# Patient Record
Sex: FEMALE | Race: WHITE | HISPANIC OR LATINO | Employment: OTHER | URBAN - METROPOLITAN AREA
[De-identification: names, ages, dates, MRNs, and addresses within clinical notes are randomized per-mention and may not be internally consistent; named-entity substitution may affect disease eponyms.]

---

## 2017-01-26 ENCOUNTER — HOSPITAL ENCOUNTER (INPATIENT)
Facility: HOSPITAL | Age: 67
LOS: 1 days | Discharge: HOME | DRG: 379 | End: 2017-01-28
Attending: EMERGENCY MEDICINE | Admitting: INTERNAL MEDICINE
Payer: MEDICARE

## 2017-01-26 DIAGNOSIS — K92.2 LOWER GI BLEED: ICD-10-CM

## 2017-01-26 DIAGNOSIS — K92.2 GI BLEED: Primary | ICD-10-CM

## 2017-01-26 PROCEDURE — 85025 COMPLETE CBC W/AUTO DIFF WBC: CPT | Performed by: EMERGENCY MEDICINE

## 2017-01-26 PROCEDURE — 86900 BLOOD TYPING SEROLOGIC ABO: CPT | Performed by: EMERGENCY MEDICINE

## 2017-01-26 PROCEDURE — 85730 THROMBOPLASTIN TIME PARTIAL: CPT | Performed by: EMERGENCY MEDICINE

## 2017-01-26 PROCEDURE — 81002 URINALYSIS NONAUTO W/O SCOPE: CPT | Performed by: EMERGENCY MEDICINE

## 2017-01-26 PROCEDURE — 83690 ASSAY OF LIPASE: CPT | Performed by: EMERGENCY MEDICINE

## 2017-01-26 PROCEDURE — 80053 COMPREHEN METABOLIC PANEL: CPT | Performed by: EMERGENCY MEDICINE

## 2017-01-26 PROCEDURE — 85610 PROTHROMBIN TIME: CPT | Performed by: EMERGENCY MEDICINE

## 2017-01-26 PROCEDURE — 86850 RBC ANTIBODY SCREEN: CPT | Performed by: EMERGENCY MEDICINE

## 2017-01-26 PROCEDURE — 86901 BLOOD TYPING SEROLOGIC RH(D): CPT | Performed by: EMERGENCY MEDICINE

## 2017-01-26 PROCEDURE — 36415 COLL VENOUS BLD VENIPUNCTURE: CPT | Performed by: EMERGENCY MEDICINE

## 2017-01-26 PROCEDURE — 81025 URINE PREGNANCY TEST: CPT | Performed by: EMERGENCY MEDICINE

## 2017-01-26 RX ORDER — HYDROCHLOROTHIAZIDE 25 MG/1
25 TABLET ORAL
COMMUNITY
Start: 2015-02-06 | End: 2017-03-25 | Stop reason: ALTCHOICE

## 2017-01-26 RX ORDER — ENALAPRIL MALEATE 20 MG/1
40 TABLET ORAL DAILY
COMMUNITY
Start: 2016-11-02 | End: 2018-05-10 | Stop reason: SDUPTHER

## 2017-01-27 ENCOUNTER — LAB CONVERSION - ENCOUNTER (OUTPATIENT)
Dept: OTHER | Facility: OTHER | Age: 67
End: 2017-01-27

## 2017-01-27 ENCOUNTER — ANESTHESIA EVENT (INPATIENT)
Dept: GASTROENTEROLOGY | Facility: HOSPITAL | Age: 67
DRG: 379 | End: 2017-01-27
Payer: MEDICARE

## 2017-01-27 ENCOUNTER — ANESTHESIA (INPATIENT)
Dept: GASTROENTEROLOGY | Facility: HOSPITAL | Age: 67
DRG: 379 | End: 2017-01-27
Payer: MEDICARE

## 2017-01-27 PROBLEM — K92.2 LOWER GI BLEED: Status: ACTIVE | Noted: 2017-01-27

## 2017-01-27 PROBLEM — I10 HTN (HYPERTENSION): Status: ACTIVE | Noted: 2017-01-27

## 2017-01-27 PROBLEM — R19.7 DIARRHEA: Status: ACTIVE | Noted: 2017-01-27

## 2017-01-27 PROBLEM — R00.0 SINUS TACHYCARDIA: Status: ACTIVE | Noted: 2017-01-27

## 2017-01-27 LAB
ABO GROUP BLD: NORMAL
ALBUMIN SERPL BCP-MCNC: 3.3 G/DL (ref 3.5–5)
ALP SERPL-CCNC: 125 U/L (ref 46–116)
ALT SERPL W P-5'-P-CCNC: 50 U/L (ref 12–78)
ANION GAP SERPL CALCULATED.3IONS-SCNC: 7 MMOL/L (ref 4–13)
APTT PPP: 29 SECONDS (ref 24–36)
AST SERPL W P-5'-P-CCNC: 45 U/L (ref 5–45)
ATRIAL RATE: 98 BPM
BACTERIA UR QL AUTO: ABNORMAL /HPF
BASOPHILS # BLD AUTO: 0.02 THOUSANDS/ΜL (ref 0–0.1)
BASOPHILS NFR BLD AUTO: 0 % (ref 0–1)
BILIRUB SERPL-MCNC: 0.32 MG/DL (ref 0.2–1)
BILIRUB UR QL STRIP: NEGATIVE
BLD GP AB SCN SERPL QL: NEGATIVE
BUN SERPL-MCNC: 11 MG/DL (ref 5–25)
CALCIUM SERPL-MCNC: 9.1 MG/DL (ref 8.3–10.1)
CHLORIDE SERPL-SCNC: 104 MMOL/L (ref 100–108)
CLARITY UR: CLEAR
CO2 SERPL-SCNC: 28 MMOL/L (ref 21–32)
COLOR UR: YELLOW
CREAT SERPL-MCNC: 0.86 MG/DL (ref 0.6–1.3)
EOSINOPHIL # BLD AUTO: 0.01 THOUSAND/ΜL (ref 0–0.61)
EOSINOPHIL NFR BLD AUTO: 0 % (ref 0–6)
ERYTHROCYTE [DISTWIDTH] IN BLOOD BY AUTOMATED COUNT: 13.6 % (ref 11.6–15.1)
GFR SERPL CREATININE-BSD FRML MDRD: >60 ML/MIN/1.73SQ M
GLUCOSE SERPL-MCNC: 206 MG/DL (ref 65–140)
GLUCOSE UR STRIP-MCNC: ABNORMAL MG/DL
HCG UR QL: NEGATIVE
HCT VFR BLD AUTO: 30.3 % (ref 34.8–46.1)
HCT VFR BLD AUTO: 35.3 % (ref 34.8–46.1)
HCT VFR BLD AUTO: 38.7 % (ref 34.8–46.1)
HGB BLD-MCNC: 10.2 G/DL (ref 11.5–15.4)
HGB BLD-MCNC: 11.9 G/DL (ref 11.5–15.4)
HGB BLD-MCNC: 12.9 G/DL (ref 11.5–15.4)
HGB UR QL STRIP.AUTO: ABNORMAL
INR PPP: 1.04 (ref 0.86–1.16)
KETONES UR STRIP-MCNC: NEGATIVE MG/DL
LEUKOCYTE ESTERASE UR QL STRIP: ABNORMAL
LIPASE SERPL-CCNC: 227 U/L (ref 73–393)
LYMPHOCYTES # BLD AUTO: 1.43 THOUSANDS/ΜL (ref 0.6–4.47)
LYMPHOCYTES NFR BLD AUTO: 15 % (ref 14–44)
MCH RBC QN AUTO: 32.3 PG (ref 26.8–34.3)
MCHC RBC AUTO-ENTMCNC: 33.3 G/DL (ref 31.4–37.4)
MCV RBC AUTO: 97 FL (ref 82–98)
MONOCYTES # BLD AUTO: 0.37 THOUSAND/ΜL (ref 0.17–1.22)
MONOCYTES NFR BLD AUTO: 4 % (ref 4–12)
NEUTROPHILS # BLD AUTO: 7.96 THOUSANDS/ΜL (ref 1.85–7.62)
NEUTS SEG NFR BLD AUTO: 81 % (ref 43–75)
NITRITE UR QL STRIP: NEGATIVE
NON-SQ EPI CELLS URNS QL MICRO: ABNORMAL /HPF
NRBC BLD AUTO-RTO: 0 /100 WBCS
P AXIS: 58 DEGREES
PH UR STRIP.AUTO: 7 [PH] (ref 4.5–8)
PLATELET # BLD AUTO: 278 THOUSANDS/UL (ref 149–390)
PMV BLD AUTO: 10.9 FL (ref 8.9–12.7)
POTASSIUM SERPL-SCNC: 3.5 MMOL/L (ref 3.5–5.3)
PR INTERVAL: 134 MS
PROT SERPL-MCNC: 7.9 G/DL (ref 6.4–8.2)
PROT UR STRIP-MCNC: NEGATIVE MG/DL
PROTHROMBIN TIME: 13.6 SECONDS (ref 12–14.3)
QRS AXIS: 54 DEGREES
QRSD INTERVAL: 68 MS
QT INTERVAL: 356 MS
QTC INTERVAL: 454 MS
RBC # BLD AUTO: 3.99 MILLION/UL (ref 3.81–5.12)
RBC #/AREA URNS AUTO: ABNORMAL /HPF
RH BLD: POSITIVE
SODIUM SERPL-SCNC: 139 MMOL/L (ref 136–145)
SP GR UR STRIP.AUTO: 1.02 (ref 1–1.03)
T WAVE AXIS: 61 DEGREES
UROBILINOGEN UR QL STRIP.AUTO: 0.2 E.U./DL
VENTRICULAR RATE: 98 BPM
WBC # BLD AUTO: 9.79 THOUSAND/UL (ref 4.31–10.16)
WBC #/AREA URNS AUTO: ABNORMAL /HPF

## 2017-01-27 PROCEDURE — 85014 HEMATOCRIT: CPT | Performed by: PHYSICIAN ASSISTANT

## 2017-01-27 PROCEDURE — 87045 FECES CULTURE AEROBIC BACT: CPT | Performed by: PHYSICIAN ASSISTANT

## 2017-01-27 PROCEDURE — 81001 URINALYSIS AUTO W/SCOPE: CPT

## 2017-01-27 PROCEDURE — 85018 HEMOGLOBIN: CPT | Performed by: PHYSICIAN ASSISTANT

## 2017-01-27 PROCEDURE — 96360 HYDRATION IV INFUSION INIT: CPT

## 2017-01-27 PROCEDURE — 87046 STOOL CULTR AEROBIC BACT EA: CPT | Performed by: PHYSICIAN ASSISTANT

## 2017-01-27 PROCEDURE — 82272 OCCULT BLD FECES 1-3 TESTS: CPT

## 2017-01-27 PROCEDURE — 87015 SPECIMEN INFECT AGNT CONCNTJ: CPT | Performed by: PHYSICIAN ASSISTANT

## 2017-01-27 PROCEDURE — 87493 C DIFF AMPLIFIED PROBE: CPT | Performed by: PHYSICIAN ASSISTANT

## 2017-01-27 PROCEDURE — 99285 EMERGENCY DEPT VISIT HI MDM: CPT

## 2017-01-27 PROCEDURE — 87086 URINE CULTURE/COLONY COUNT: CPT

## 2017-01-27 PROCEDURE — 87899 AGENT NOS ASSAY W/OPTIC: CPT | Performed by: PHYSICIAN ASSISTANT

## 2017-01-27 PROCEDURE — 0DJD8ZZ INSPECTION OF LOWER INTESTINAL TRACT, VIA NATURAL OR ARTIFICIAL OPENING ENDOSCOPIC: ICD-10-PCS | Performed by: INTERNAL MEDICINE

## 2017-01-27 PROCEDURE — 93005 ELECTROCARDIOGRAM TRACING: CPT | Performed by: PHYSICIAN ASSISTANT

## 2017-01-27 RX ORDER — ONDANSETRON 2 MG/ML
4 INJECTION INTRAMUSCULAR; INTRAVENOUS EVERY 6 HOURS PRN
Status: DISCONTINUED | OUTPATIENT
Start: 2017-01-27 | End: 2017-01-27

## 2017-01-27 RX ORDER — SODIUM CHLORIDE 9 MG/ML
75 INJECTION, SOLUTION INTRAVENOUS CONTINUOUS
Status: DISCONTINUED | OUTPATIENT
Start: 2017-01-27 | End: 2017-01-28

## 2017-01-27 RX ORDER — ENALAPRIL MALEATE 10 MG/1
40 TABLET ORAL DAILY
Status: DISCONTINUED | OUTPATIENT
Start: 2017-01-27 | End: 2017-01-28 | Stop reason: HOSPADM

## 2017-01-27 RX ORDER — CALCIUM CARBONATE 200(500)MG
1000 TABLET,CHEWABLE ORAL DAILY PRN
Status: DISCONTINUED | OUTPATIENT
Start: 2017-01-27 | End: 2017-01-27

## 2017-01-27 RX ORDER — MORPHINE SULFATE 4 MG/ML
4 INJECTION, SOLUTION INTRAMUSCULAR; INTRAVENOUS ONCE AS NEEDED
Status: DISCONTINUED | OUTPATIENT
Start: 2017-01-27 | End: 2017-01-27

## 2017-01-27 RX ORDER — ONDANSETRON 2 MG/ML
4 INJECTION INTRAMUSCULAR; INTRAVENOUS ONCE AS NEEDED
Status: DISCONTINUED | OUTPATIENT
Start: 2017-01-27 | End: 2017-01-27

## 2017-01-27 RX ORDER — ACETAMINOPHEN 325 MG/1
650 TABLET ORAL EVERY 4 HOURS PRN
Status: DISCONTINUED | OUTPATIENT
Start: 2017-01-27 | End: 2017-01-28 | Stop reason: HOSPADM

## 2017-01-27 RX ORDER — PROPOFOL 10 MG/ML
INJECTION, EMULSION INTRAVENOUS AS NEEDED
Status: DISCONTINUED | OUTPATIENT
Start: 2017-01-27 | End: 2017-01-27 | Stop reason: SURG

## 2017-01-27 RX ADMIN — PROPOFOL 100 MG: 10 INJECTION, EMULSION INTRAVENOUS at 14:38

## 2017-01-27 RX ADMIN — SODIUM CHLORIDE 1000 ML: 0.9 INJECTION, SOLUTION INTRAVENOUS at 01:36

## 2017-01-27 RX ADMIN — SODIUM CHLORIDE 75 ML/HR: 0.9 INJECTION, SOLUTION INTRAVENOUS at 02:44

## 2017-01-27 RX ADMIN — SODIUM CHLORIDE: 0.9 INJECTION, SOLUTION INTRAVENOUS at 15:01

## 2017-01-27 RX ADMIN — PROPOFOL 100 MG: 10 INJECTION, EMULSION INTRAVENOUS at 14:43

## 2017-01-27 RX ADMIN — ENALAPRIL MALEATE 40 MG: 10 TABLET ORAL at 08:01

## 2017-01-27 RX ADMIN — SODIUM CHLORIDE 75 ML/HR: 0.9 INJECTION, SOLUTION INTRAVENOUS at 05:22

## 2017-01-27 RX ADMIN — SODIUM CHLORIDE 1000 ML: 0.9 INJECTION, SOLUTION INTRAVENOUS at 00:00

## 2017-01-28 VITALS
SYSTOLIC BLOOD PRESSURE: 159 MMHG | TEMPERATURE: 97.2 F | RESPIRATION RATE: 18 BRPM | HEIGHT: 67 IN | BODY MASS INDEX: 24.84 KG/M2 | HEART RATE: 73 BPM | WEIGHT: 158.29 LBS | OXYGEN SATURATION: 98 % | DIASTOLIC BLOOD PRESSURE: 70 MMHG

## 2017-01-28 LAB
BACTERIA UR CULT: NORMAL
C DIFF TOX GENS STL QL NAA+PROBE: NORMAL
HCT VFR BLD AUTO: 33 % (ref 34.8–46.1)
HGB BLD-MCNC: 11 G/DL (ref 11.5–15.4)

## 2017-01-28 PROCEDURE — 85018 HEMOGLOBIN: CPT | Performed by: PHYSICIAN ASSISTANT

## 2017-01-28 PROCEDURE — 85014 HEMATOCRIT: CPT | Performed by: PHYSICIAN ASSISTANT

## 2017-01-28 RX ORDER — DOCUSATE SODIUM 100 MG/1
100 CAPSULE, LIQUID FILLED ORAL 2 TIMES DAILY
Qty: 60 CAPSULE | Refills: 0 | Status: SHIPPED | OUTPATIENT
Start: 2017-01-28 | End: 2019-03-22

## 2017-01-28 RX ADMIN — ENALAPRIL MALEATE 40 MG: 10 TABLET ORAL at 08:36

## 2017-01-29 LAB
BACTERIA STL CULT: NORMAL
BACTERIA STL CULT: NORMAL

## 2017-01-31 ENCOUNTER — GENERIC CONVERSION - ENCOUNTER (OUTPATIENT)
Dept: OTHER | Facility: OTHER | Age: 67
End: 2017-01-31

## 2017-02-02 ENCOUNTER — ALLSCRIPTS OFFICE VISIT (OUTPATIENT)
Dept: OTHER | Facility: OTHER | Age: 67
End: 2017-02-02

## 2017-02-14 ENCOUNTER — ANESTHESIA EVENT (OUTPATIENT)
Dept: GASTROENTEROLOGY | Facility: MEDICAL CENTER | Age: 67
End: 2017-02-14

## 2017-02-14 ENCOUNTER — ANESTHESIA (OUTPATIENT)
Dept: GASTROENTEROLOGY | Facility: MEDICAL CENTER | Age: 67
End: 2017-02-14

## 2017-03-25 ENCOUNTER — APPOINTMENT (INPATIENT)
Dept: CT IMAGING | Facility: HOSPITAL | Age: 67
DRG: 378 | End: 2017-03-25
Payer: MEDICARE

## 2017-03-25 ENCOUNTER — HOSPITAL ENCOUNTER (INPATIENT)
Facility: HOSPITAL | Age: 67
LOS: 5 days | Discharge: HOME | DRG: 378 | End: 2017-03-30
Attending: EMERGENCY MEDICINE | Admitting: INTERNAL MEDICINE
Payer: MEDICARE

## 2017-03-25 DIAGNOSIS — D62 ACUTE BLOOD LOSS ANEMIA: ICD-10-CM

## 2017-03-25 DIAGNOSIS — K92.1 BLOOD IN STOOL: ICD-10-CM

## 2017-03-25 DIAGNOSIS — K62.5 RECTAL BLEEDING: Primary | ICD-10-CM

## 2017-03-25 LAB
ALBUMIN SERPL BCP-MCNC: 3.7 G/DL (ref 3.5–5)
ALP SERPL-CCNC: 141 U/L (ref 46–116)
ALT SERPL W P-5'-P-CCNC: 36 U/L (ref 12–78)
ANION GAP SERPL CALCULATED.3IONS-SCNC: 8 MMOL/L (ref 4–13)
APTT PPP: 27 SECONDS (ref 24–36)
AST SERPL W P-5'-P-CCNC: 34 U/L (ref 5–45)
BASOPHILS # BLD AUTO: 0.04 THOUSANDS/ΜL (ref 0–0.1)
BASOPHILS NFR BLD AUTO: 0 % (ref 0–1)
BILIRUB SERPL-MCNC: 0.25 MG/DL (ref 0.2–1)
BUN SERPL-MCNC: 12 MG/DL (ref 5–25)
CALCIUM SERPL-MCNC: 8.5 MG/DL (ref 8.3–10.1)
CHLORIDE SERPL-SCNC: 104 MMOL/L (ref 100–108)
CO2 SERPL-SCNC: 30 MMOL/L (ref 21–32)
CREAT SERPL-MCNC: 0.88 MG/DL (ref 0.6–1.3)
EOSINOPHIL # BLD AUTO: 0.03 THOUSAND/ΜL (ref 0–0.61)
EOSINOPHIL NFR BLD AUTO: 0 % (ref 0–6)
ERYTHROCYTE [DISTWIDTH] IN BLOOD BY AUTOMATED COUNT: 13.1 % (ref 11.6–15.1)
GFR SERPL CREATININE-BSD FRML MDRD: >60 ML/MIN/1.73SQ M
GLUCOSE SERPL-MCNC: 127 MG/DL (ref 65–140)
HCT VFR BLD AUTO: 38.8 % (ref 34.8–46.1)
HGB BLD-MCNC: 12.7 G/DL (ref 11.5–15.4)
HGB BLD-MCNC: 9.4 G/DL (ref 11.5–15.4)
INR PPP: 0.99 (ref 0.86–1.16)
LACTATE SERPL-SCNC: 1.8 MMOL/L (ref 0.5–2)
LIPASE SERPL-CCNC: 218 U/L (ref 73–393)
LYMPHOCYTES # BLD AUTO: 1.52 THOUSANDS/ΜL (ref 0.6–4.47)
LYMPHOCYTES NFR BLD AUTO: 14 % (ref 14–44)
MCH RBC QN AUTO: 31.3 PG (ref 26.8–34.3)
MCHC RBC AUTO-ENTMCNC: 32.7 G/DL (ref 31.4–37.4)
MCV RBC AUTO: 96 FL (ref 82–98)
MONOCYTES # BLD AUTO: 0.64 THOUSAND/ΜL (ref 0.17–1.22)
MONOCYTES NFR BLD AUTO: 6 % (ref 4–12)
NEUTROPHILS # BLD AUTO: 8.68 THOUSANDS/ΜL (ref 1.85–7.62)
NEUTS SEG NFR BLD AUTO: 80 % (ref 43–75)
NRBC BLD AUTO-RTO: 0 /100 WBCS
PLATELET # BLD AUTO: 314 THOUSANDS/UL (ref 149–390)
PMV BLD AUTO: 11 FL (ref 8.9–12.7)
POTASSIUM SERPL-SCNC: 3.5 MMOL/L (ref 3.5–5.3)
PROT SERPL-MCNC: 8.4 G/DL (ref 6.4–8.2)
PROTHROMBIN TIME: 13.1 SECONDS (ref 12–14.3)
RBC # BLD AUTO: 4.06 MILLION/UL (ref 3.81–5.12)
SODIUM SERPL-SCNC: 142 MMOL/L (ref 136–145)
WBC # BLD AUTO: 10.91 THOUSAND/UL (ref 4.31–10.16)

## 2017-03-25 PROCEDURE — 83690 ASSAY OF LIPASE: CPT | Performed by: EMERGENCY MEDICINE

## 2017-03-25 PROCEDURE — 80053 COMPREHEN METABOLIC PANEL: CPT | Performed by: EMERGENCY MEDICINE

## 2017-03-25 PROCEDURE — 74174 CTA ABD&PLVS W/CONTRAST: CPT

## 2017-03-25 PROCEDURE — 99285 EMERGENCY DEPT VISIT HI MDM: CPT

## 2017-03-25 PROCEDURE — 85025 COMPLETE CBC W/AUTO DIFF WBC: CPT | Performed by: EMERGENCY MEDICINE

## 2017-03-25 PROCEDURE — 83605 ASSAY OF LACTIC ACID: CPT | Performed by: PHYSICIAN ASSISTANT

## 2017-03-25 PROCEDURE — 85018 HEMOGLOBIN: CPT | Performed by: NURSE PRACTITIONER

## 2017-03-25 PROCEDURE — 85610 PROTHROMBIN TIME: CPT | Performed by: EMERGENCY MEDICINE

## 2017-03-25 PROCEDURE — C9113 INJ PANTOPRAZOLE SODIUM, VIA: HCPCS | Performed by: PHYSICIAN ASSISTANT

## 2017-03-25 PROCEDURE — 82272 OCCULT BLD FECES 1-3 TESTS: CPT

## 2017-03-25 PROCEDURE — 85730 THROMBOPLASTIN TIME PARTIAL: CPT | Performed by: EMERGENCY MEDICINE

## 2017-03-25 PROCEDURE — 36415 COLL VENOUS BLD VENIPUNCTURE: CPT | Performed by: EMERGENCY MEDICINE

## 2017-03-25 RX ORDER — DOCUSATE SODIUM 100 MG/1
100 CAPSULE, LIQUID FILLED ORAL 2 TIMES DAILY
Status: DISCONTINUED | OUTPATIENT
Start: 2017-03-25 | End: 2017-03-30 | Stop reason: HOSPADM

## 2017-03-25 RX ORDER — ENALAPRIL MALEATE 10 MG/1
40 TABLET ORAL DAILY
Status: DISCONTINUED | OUTPATIENT
Start: 2017-03-25 | End: 2017-03-26

## 2017-03-25 RX ORDER — ASPIRIN 81 MG/1
81 TABLET, CHEWABLE ORAL DAILY
Status: DISCONTINUED | OUTPATIENT
Start: 2017-03-25 | End: 2017-03-25

## 2017-03-25 RX ORDER — SODIUM CHLORIDE 9 MG/ML
75 INJECTION, SOLUTION INTRAVENOUS CONTINUOUS
Status: DISCONTINUED | OUTPATIENT
Start: 2017-03-25 | End: 2017-03-27

## 2017-03-25 RX ORDER — CALCIUM CARBONATE 200(500)MG
1000 TABLET,CHEWABLE ORAL DAILY PRN
Status: DISCONTINUED | OUTPATIENT
Start: 2017-03-25 | End: 2017-03-30 | Stop reason: HOSPADM

## 2017-03-25 RX ORDER — ONDANSETRON 2 MG/ML
4 INJECTION INTRAMUSCULAR; INTRAVENOUS EVERY 6 HOURS PRN
Status: DISCONTINUED | OUTPATIENT
Start: 2017-03-25 | End: 2017-03-30 | Stop reason: HOSPADM

## 2017-03-25 RX ORDER — PANTOPRAZOLE SODIUM 40 MG/1
40 INJECTION, POWDER, FOR SOLUTION INTRAVENOUS EVERY 12 HOURS SCHEDULED
Status: DISCONTINUED | OUTPATIENT
Start: 2017-03-25 | End: 2017-03-30 | Stop reason: HOSPADM

## 2017-03-25 RX ORDER — ACETAMINOPHEN 325 MG/1
650 TABLET ORAL EVERY 6 HOURS PRN
Status: DISCONTINUED | OUTPATIENT
Start: 2017-03-25 | End: 2017-03-30 | Stop reason: HOSPADM

## 2017-03-25 RX ADMIN — SODIUM CHLORIDE 60 ML/HR: 0.9 INJECTION, SOLUTION INTRAVENOUS at 14:35

## 2017-03-25 RX ADMIN — PANTOPRAZOLE SODIUM 40 MG: 40 INJECTION, POWDER, FOR SOLUTION INTRAVENOUS at 15:19

## 2017-03-25 RX ADMIN — PANTOPRAZOLE SODIUM 40 MG: 40 INJECTION, POWDER, FOR SOLUTION INTRAVENOUS at 21:38

## 2017-03-25 RX ADMIN — IOHEXOL 100 ML: 350 INJECTION, SOLUTION INTRAVENOUS at 22:13

## 2017-03-25 RX ADMIN — SODIUM CHLORIDE 1000 ML: 0.9 INJECTION, SOLUTION INTRAVENOUS at 21:33

## 2017-03-25 NOTE — ED PROVIDER NOTES
History  Chief Complaint   Patient presents with    Black or Bloody Stool     Patient reports dark red blood in stool this morning  Denies any abdominal pain, N/V  Hx of diverticulitis, seen here 1/27 for the same per patient  Patient is a 78 y o  female presenting with blood in stool  History provided by:  Patient   used: No    Black or Bloody Stool   Quality:  Bright red  Amount: Moderate  Duration:  3 hours  Timing:  Intermittent  Chronicity:  Recurrent  Context: spontaneously    Similar prior episodes: yes    Relieved by:  Nothing  Worsened by:  Nothing  Ineffective treatments:  None tried  Associated symptoms: no abdominal pain, no dizziness, no fever, no light-headedness, no loss of consciousness and no vomiting    Risk factors: no anticoagulant use and no hx of colorectal cancer    Risk factors comment:  DIVERTICULOSIS      Prior to Admission Medications   Prescriptions Last Dose Informant Patient Reported? Taking? Calcium-Magnesium-Vitamin D (CALCIUM 500 PO)   Yes Yes   Sig: Take by mouth daily   FIBER PO   Yes Yes   Sig: Take by mouth daily   aspirin 81 MG tablet   Yes Yes   Sig: Take 81 mg by mouth daily   docusate sodium (COLACE) 100 mg capsule   No Yes   Sig: Take 1 capsule by mouth 2 (two) times a day for 30 days   enalapril (VASOTEC) 20 mg tablet   Yes Yes   Sig: Take 40 mg by mouth      Facility-Administered Medications: None       Past Medical History:   Diagnosis Date    Arthritis     Diverticulosis of colon     Hx of bleeding disorder     rectal bleeding    Hypertension        Past Surgical History:   Procedure Laterality Date    COLONOSCOPY N/A 1/27/2017    Procedure: COLONOSCOPY;  Surgeon: Sade Swenson MD;  Location: [facility]; Service:     TUBAL LIGATION         History reviewed  No pertinent family history  I have reviewed and agree with the history as documented      Social History   Substance Use Topics    Smoking status: Never Smoker    Smokeless tobacco: None    Alcohol use No        Review of Systems   Constitutional: Negative for activity change, chills and fever  HENT: Negative for facial swelling, sore throat and trouble swallowing  Eyes: Negative for pain and visual disturbance  Respiratory: Negative for cough, chest tightness and shortness of breath  Cardiovascular: Negative for chest pain and leg swelling  Gastrointestinal: Positive for blood in stool  Negative for abdominal pain, diarrhea, nausea and vomiting  Genitourinary: Negative for dysuria and flank pain  Musculoskeletal: Negative for back pain, neck pain and neck stiffness  Skin: Negative for pallor and rash  Allergic/Immunologic: Negative for environmental allergies and immunocompromised state  Neurological: Negative for dizziness, loss of consciousness, light-headedness and headaches  Hematological: Negative for adenopathy  Does not bruise/bleed easily  Psychiatric/Behavioral: Negative for agitation and behavioral problems  All other systems reviewed and are negative  Physical Exam    ED Triage Vitals   Temperature Pulse Respirations Blood Pressure SpO2   03/25/17 1112 03/25/17 1112 03/25/17 1112 03/25/17 1112 03/25/17 1112   97 5 °F (36 4 °C) 106 20 193/93 98 %      Temp Source Heart Rate Source Patient Position BP Location FiO2 (%)   03/25/17 1112 03/25/17 1112 03/25/17 1310 03/25/17 1112 --   Temporal Monitor Sitting Right arm       Pain Score       03/25/17 1112       No Pain           Physical Exam   Constitutional: She is oriented to person, place, and time  She appears well-developed and well-nourished  No distress  HENT:   Head: Normocephalic and atraumatic  Eyes: EOM are normal    Neck: Normal range of motion  Neck supple  Cardiovascular: Normal rate, regular rhythm, normal heart sounds and intact distal pulses  Pulmonary/Chest: Effort normal and breath sounds normal    Abdominal: Soft  Bowel sounds are normal  There is no tenderness  There is no rebound and no guarding  Genitourinary: Rectal exam shows guaiac positive stool  Genitourinary Comments: Red blood on rectal exam   Musculoskeletal: Normal range of motion  Neurological: She is alert and oriented to person, place, and time  Skin: Skin is warm and dry  Psychiatric: She has a normal mood and affect  Nursing note and vitals reviewed        ED Medications  Medications   docusate sodium (COLACE) capsule 100 mg (100 mg Oral Not Given 3/25/17 1717)   enalapril (VASOTEC) tablet 40 mg (40 mg Oral Not Given 3/25/17 1514)   ondansetron (ZOFRAN) injection 4 mg (not administered)   calcium carbonate (TUMS) chewable tablet 1,000 mg (not administered)   acetaminophen (TYLENOL) tablet 650 mg (not administered)   pantoprazole (PROTONIX) injection 40 mg (40 mg Intravenous Given 3/25/17 1519)   sodium chloride 0 9 % infusion (60 mL/hr Intravenous New Bag 3/25/17 1435)   iohexol (OMNIPAQUE) 240 MG/ML solution 50 mL (not administered)       Diagnostic Studies  Labs Reviewed   CBC AND DIFFERENTIAL - Abnormal        Result Value Ref Range Status    WBC 10 91 (*) 4 31 - 10 16 Thousand/uL Final    Neutrophils Relative 80 (*) 43 - 75 % Final    Neutrophils Absolute 8 68 (*) 1 85 - 7 62 Thousands/µL Final    RBC 4 06  3 81 - 5 12 Million/uL Final    Hemoglobin 12 7  11 5 - 15 4 g/dL Final    Hematocrit 38 8  34 8 - 46 1 % Final    MCV 96  82 - 98 fL Final    MCH 31 3  26 8 - 34 3 pg Final    MCHC 32 7  31 4 - 37 4 g/dL Final    RDW 13 1  11 6 - 15 1 % Final    MPV 11 0  8 9 - 12 7 fL Final    Platelets 698  868 - 390 Thousands/uL Final    nRBC 0  /100 WBCs Final    Lymphocytes Relative 14  14 - 44 % Final    Monocytes Relative 6  4 - 12 % Final    Eosinophils Relative 0  0 - 6 % Final    Basophils Relative 0  0 - 1 % Final    Lymphocytes Absolute 1 52  0 60 - 4 47 Thousands/µL Final    Monocytes Absolute 0 64  0 17 - 1 22 Thousand/µL Final    Eosinophils Absolute 0 03  0 00 - 0 61 Thousand/µL Final Basophils Absolute 0 04  0 00 - 0 10 Thousands/µL Final   COMPREHENSIVE METABOLIC PANEL - Abnormal     Alkaline Phosphatase 141 (*) 46 - 116 U/L Final    Total Protein 8 4 (*) 6 4 - 8 2 g/dL Final    Sodium 142  136 - 145 mmol/L Final    Potassium 3 5  3 5 - 5 3 mmol/L Final    Chloride 104  100 - 108 mmol/L Final    CO2 30  21 - 32 mmol/L Final    Anion Gap 8  4 - 13 mmol/L Final    BUN 12  5 - 25 mg/dL Final    Creatinine 0 88  0 60 - 1 30 mg/dL Final    Comment: Standardized to IDMS reference method    Glucose 127  65 - 140 mg/dL Final    Comment: If the patient is fasting, the ADA then defines impaired fasting glucose as > 100 mg/dL and diabetes as > or equal to 123 mg/dL  Calcium 8 5  8 3 - 10 1 mg/dL Final    AST 34  5 - 45 U/L Final    ALT 36  12 - 78 U/L Final    Albumin 3 7  3 5 - 5 0 g/dL Final    Total Bilirubin 0 25  0 20 - 1 00 mg/dL Final    eGFR >60 0  ml/min/1 73sq m Final    Narrative:     National Kidney Disease Education Program recommendations are as follows:  GFR calculation is accurate only with a steady state creatinine  Chronic Kidney disease less than 60 ml/min/1 73 sq  meters  Kidney failure less than 15 ml/min/1 73 sq  meters  PROTIME-INR - Normal    Protime 13 1  12 0 - 14 3 seconds Final    INR 0 99  0 86 - 1 16 Final   APTT - Normal    PTT 27  24 - 36 seconds Final    Narrative: Therapeutic Heparin Range = 60-90 seconds   LIPASE - Normal    Lipase 218  73 - 393 u/L Final   LACTIC ACID, PLASMA - Normal    LACTIC ACID 1 8  0 5 - 2 0 mmol/L Final    Narrative:     Result may be elevated if tourniquet was used during collection     CLOSTRIDIUM DIFFICILE TOXIN BY PCR       No orders to display       Procedures  Procedures      Phone Contacts  ED Phone Contact    ED Course  ED Course   Kehinde Aguiar's Documentation   Comment Time   Spoke with Dr Walter Angeles, GI, case discussed, rectal bleeding, stable, H and H stable; known Diverticulosis, abd exam benign, agree with no indication for imaging; Kuldip Mcgovern witness; we will admit for H and H monitoring  03/25 1227                             MDM  Number of Diagnoses or Management Options  Rectal bleeding: new and requires workup  Diagnosis management comments: Patient is a 79-year-old female, with known history of diverticulosis, was recently admitted in the hospital for rectal bleeding in January, 2017, had limited colonoscopy that showed diverticulosis at that time, was not able to follow-up as outpatient  She comes back with rectal bleeding started around 9 AM today, noticed dark red blood with the stool, denies dizziness, chest or abd pain, dyspnea or any other symptoms  On exam no acute distress, vital signs are stable, abdomen soft, nontender nondistended, rest of physical exam within normal limits  D/D: Known Diverticulosis, rectal bleeding  We will check labs, touch base with GI, admit for H and H monitoring  Amount and/or Complexity of Data Reviewed  Clinical lab tests: reviewed and ordered  Tests in the medicine section of CPT®: ordered and reviewed  Discuss the patient with other providers: yes      CritCare Time    Disposition  Final diagnoses:   Rectal bleeding     ED Disposition     ED Disposition Condition Comment    Admit  Case was discussed with [facility] Advanced Practitioner Gt Ramirez) and the patient's admission status was agreed to be Admission Status: inpatient status to the service of Dr Aleida Blackmon  Follow-up Information     None        Current Discharge Medication List      CONTINUE these medications which have NOT CHANGED    Details   aspirin 81 MG tablet Take 81 mg by mouth daily      Calcium-Magnesium-Vitamin D (CALCIUM 500 PO) Take by mouth daily      docusate sodium (COLACE) 100 mg capsule Take 1 capsule by mouth 2 (two) times a day for 30 days  Qty: 60 capsule, Refills: 0      enalapril (VASOTEC) 20 mg tablet Take 40 mg by mouth      FIBER PO Take by mouth daily           No discharge procedures on file      ED Provider  Electronically Signed by       Elena Martin MD  03/25/17 6754

## 2017-03-25 NOTE — IP AVS SNAPSHOT
[address] [place] [address]  [place], [address] [facility]   [phone]            After Visit Summary for:             Cary Gee   66 yrs Female (1950)    MRN:  [id number]           About your hospitalization     You were admitted on:  March 25, 2017 You last received care in the:  [address] [place] [address] [address]    You were discharged on:  March 30, 2017 Unit phone number:  [phone]         Medications     It is important that you maintain an up-to-date list of medications (prescribed and over-the-counter, as well as vitamins and mineral supplements) that you are taking  Bring your list of current medications whenever you seek treatment at a hospital or other healthcare setting  If you have any questions or concerns, contact your primary care physician's office             Your Medications      TAKE these medications     aspirin 81 MG tablet   Take 81 mg by mouth daily   Refills:  0           CALCIUM 500 PO   Take by mouth daily   Refills:  0           docusate sodium 100 mg capsule   Take 1 capsule by mouth 2 (two) times a day for 30 days   Last time this was given:  100 mg on 3/30/2017  9:21 AM   Refills:  0   Commonly known as:  COLACE           enalapril 20 mg tablet   Take 40 mg by mouth   Refills:  0   Commonly known as:  VASOTEC           ferrous gluconate 324 mg tablet   Take 1 tablet by mouth 2 (two) times a day before meals for 30 days   Last time this was given:  324 mg on 3/30/2017  6:02 AM   Refills:  0   Commonly known as:  FERGON           FIBER PO   Take by mouth daily   Refills:  0           pantoprazole 40 mg tablet   Take 1 tablet by mouth daily in the early morning for 30 days   Refills:  0   Commonly known as:  PROTONIX   Start taking on:  3/31/2017                Where to Get Your Medications      You can get these medications from any pharmacy     Bring a paper prescription for each of these medications     ferrous gluconate 324 mg tablet    pantoprazole 40 mg tablet                  Your Medications      Your Medication List           Morning Noon Evening Bedtime As Needed    aspirin 81 MG tablet   Take 81 mg by mouth daily                                   CALCIUM 500 PO   Take by mouth daily                                   docusate sodium 100 mg capsule   Take 1 capsule by mouth 2 (two) times a day for 30 days   Last time this was given:  100 mg on 3/30/2017  9:21 AM   Commonly known as:  COLACE                                   enalapril 20 mg tablet   Take 40 mg by mouth   Commonly known as:  VASOTEC                                   ferrous gluconate 324 mg tablet   Take 1 tablet by mouth 2 (two) times a day before meals for 30 days   Last time this was given:  324 mg on 3/30/2017  6:02 AM   Commonly known as:  FERGON                                   FIBER PO   Take by mouth daily                                   pantoprazole 40 mg tablet   Take 1 tablet by mouth daily in the early morning for 30 days   Commonly known as:  PROTONIX   Start taking on:  3/31/2017                                            Follow-ups for After Discharge        Your Allergies  Date Reviewed: 3/28/2017    No active allergies      POLST/Adv Directive          Most Recent Value    POLST  Patient does not have POLST - would not like information    Advance Directive  Patient does not have advance directive - would not like information    Mental Health Surrogate  No          Instructions for After Discharge           Summary of Your Hospitalization        Reason for Hospitalization     Your primary diagnosis was:   Lower Gi Bleed    Your diagnoses also included:  Hypertension, Acute Blood Loss Anemia, Syncope And Collapse      Chief Complaint     Black or Bloody Stool      Attending/Consulting Providers     Provider Service Role Specialty Primary office phone    Sandra Hurley MD -- Attending Provider Internal Medicine [phone]    Sussy Xiong PA-C -- Advanced Practitioner  -- [phone]    Karma Howard MD Gastroenterology Consulting Physician  Gastroenterology [phone]    Karma Howard MD Gastroenterology Surgeon  Gastroenterology [phone]    Stacy Mcneil MD Gastroenterology Surgeon  Gastroenterology [phone]    Herman Ly MD Surgery-General Consulting Physician  General Surgery [phone]      Surgery Information     ID Date/Time Status All Surgeons All Procedures Location          [id number] 3/26/2017 1100 Posted Karma Howard MD COLONOSCOPY with hemostasis clip placement [facility]      [id number] 3/27/2017 1515 Posted Stacy Mcneil MD ESOPHAGOGASTRODUODENOSCOPY (EGD) with APC  SIGMOIDOSCOPY FLEXIBLE [33376 (CPT®)] [facility]      [id number] 3/27/2017 [address] [29052 (CPT®)] [facility]      [id number] 3/28/2017 1430 [facility], MD COLONOSCOPY [facility]        Last Resulted Labs     Date/Time Component Value Reference Range Units Lab Status    03/26/17 0431 WBC 14 42 4 31 - 10 16 Thousand/uL Thousand/uL Final result    03/30/17 0513 HGB 6 8 11 5 - 15 4 g/dL g/dL Final result    03/30/17 0513 HCT 21 6 34 8 - 46 1 % % Final result    03/26/17 0431  149 - 390 Thousands/uL Thousands/uL Final result    03/27/17 0514  136 - 145 mmol/L mmol/L Final result    03/28/17 1245 K 2 7 3 5 - 5 3 mmol/L mmol/L Final result    03/27/17 0514  100 - 108 mmol/L mmol/L Final result    03/27/17 0514 CO2 26 21 - 32 mmol/L mmol/L Final result    03/27/17 0514 BUN 8 5 - 25 mg/dL mg/dL Final result    03/27/17 0514 CREATININE 0 72 0 60 - 1 30 mg/dL mg/dL Final result    03/27/17 0514 GLUCOSE 91 65 - 140 mg/dL mg/dL Final result    03/26/17 0431 ALKPHOS 89 46 - 116 U/L U/L Final result    03/26/17 0431 ALT 26 12 - 78 U/L U/L Final result    03/26/17 0431 AST 23 5 - 45 U/L U/L Final result    03/26/17 0431 ALBUMIN 2 8 3 5 - 5 0 g/dL g/dL Final result    03/26/17 0431 BILITOT 0 29 0 20 - 1 00 mg/dL mg/dL Final result 03/25/17 1131 LIPASE 218 73 - 393 u/L u/L Final result    03/25/17 1131 INR 0 99 0 86 - 1 16 - Final result      [employer]     To access this document and other health information online, please visit www  [employer] to login or create a patient portal account  For questions about any pending test results, you may contact the ordering physician or your primary care provider  Discharge Weight          Most Recent Value    Weight  71 6 kg (157 lb 13 6 oz) filed at 03/25/2017 2219      Educational Information     Venous Thromboembolism (VTE) / Deep Vein Thrombosis (DVT) Prevention   VTE/DVT is a disease caused by blood clots that form in veins  Blood clots can be serious and common in patients with risk factors  VTE/DVT may occur after discharge  To decrease risks, take anticoagulation medicine if ordered by your doctor  Report any calf pain, swelling or tenderness and/or shortness of breath to your doctor immediately  Smoking Cessation   If you smoke, use tobacco or nicotine, and/or are exposed to second hand smoke, you are encouraged to stop to improve your health  If you need help quitting, please talk to your health care provider or call:  · [place] [address] ([phone])  · [facility] ([phone])   · [address] [place] [address] ([phone])      If your symptoms return or if your condition unexpectedly worsens from the time of discharge, notify your doctor or go to the nearest emergency room  If you think you are experiencing a medical emergency, call [phone]  Readmission Risk Score     Anand Estrella is committed to ensuring a safe discharge plan that will allow you to continue your recovery at home  Your risk for readmission has been determined to be MEDIUM      To prevent readmission, please be sure to:   See your health care provider within 1 week of discharge   Follow your discharge instructions    Take your medication as prescribed   Call your health care provider with any questions or concerns

## 2017-03-25 NOTE — CONSULTS
Consultation - [facility]  Tita Frank 78 y o  female MRN: [id number]  Unit/Bed#: [address] [id number] Encounter: [id number]        Inpatient consult to gastroenterology  Consult performed by: Brandt Fischer  Consult ordered by: Jesus Chew          Reason for Consult / Principal Problem: Lower GI bleed    HPI: Tita Frank is a 78y o  year old female who presents with Lower GI bleed   Patient has a history of diverticulosis bleeding  She reported having one episode of bright red blood per rectum at home and decided to come to the emergency room  After she is admitted to the hospital she had another episode with fresh blood from the rectum  Her hemoglobin is stable  She is hemodynamically stable  She denies abdominal pain  She was recently admitted in January with similar episode  She had a colonoscopy with fair prep, findings are consistent with diverticulosis bleeding  Patient is not taking any blood thinners  Patient is a [employer]  Review of Systems: as per HPI  Review of Systems   Constitutional: Negative  HENT: Negative  Eyes: Negative  Respiratory: Negative  Cardiovascular: Negative  Gastrointestinal: Positive for blood in stool  Endocrine: Negative  Genitourinary: Negative  Musculoskeletal: Negative  Skin: Negative  Allergic/Immunologic: Negative  Neurological: Negative  Hematological: Negative  Psychiatric/Behavioral: Negative  Historical Information   Past Medical History:   Diagnosis Date    Arthritis     Diverticulosis of colon     Hx of bleeding disorder     rectal bleeding    Hypertension      Past Surgical History:   Procedure Laterality Date    COLONOSCOPY N/A 1/27/2017    Procedure: COLONOSCOPY;  Surgeon: Sincere Anderson MD;  Location: [facility];   Service:     TUBAL LIGATION       Social History   History   Alcohol Use No     History   Drug Use No     History   Smoking Status    Never Smoker   Smokeless Tobacco  Not on file     History reviewed  No pertinent family history  Meds/Allergies     Prescriptions Prior to Admission   Medication    aspirin 81 MG tablet    Calcium-Magnesium-Vitamin D (CALCIUM 500 PO)    docusate sodium (COLACE) 100 mg capsule    enalapril (VASOTEC) 20 mg tablet    FIBER PO     Current Facility-Administered Medications   Medication Dose Route Frequency    acetaminophen (TYLENOL) tablet 650 mg  650 mg Oral Q6H PRN    calcium carbonate (TUMS) chewable tablet 1,000 mg  1,000 mg Oral Daily PRN    docusate sodium (COLACE) capsule 100 mg  100 mg Oral BID    enalapril (VASOTEC) tablet 40 mg  40 mg Oral Daily    iohexol (OMNIPAQUE) 240 MG/ML solution 50 mL  50 mL Oral 90 min pre-procedure    ondansetron (ZOFRAN) injection 4 mg  4 mg Intravenous Q6H PRN    pantoprazole (PROTONIX) injection 40 mg  40 mg Intravenous Q12H APPLE    sodium chloride 0 9 % infusion  60 mL/hr Intravenous Continuous       No Known Allergies    Objective     Blood pressure 145/65, pulse 82, temperature (!) 96 °F (35 6 °C), temperature source Tympanic, resp  rate 16, height 5' 7" (1 702 m), weight 70 9 kg (156 lb 4 9 oz), SpO2 99 %  No intake or output data in the 24 hours ending 03/25/17 1639    PHYSICAL EXAM     Physical Exam   Constitutional: She is oriented to person, place, and time  She appears well-developed and well-nourished  HENT:   Head: Normocephalic  Eyes: EOM are normal  Pupils are equal, round, and reactive to light  Neck: Normal range of motion  Cardiovascular: Normal rate and regular rhythm  Pulmonary/Chest: Effort normal    Abdominal: Soft  Bowel sounds are normal    Rectal exam showed blood   Musculoskeletal: Normal range of motion  Neurological: She is alert and oriented to person, place, and time  Skin: Skin is warm  Psychiatric: She has a normal mood and affect         Lab Results:   Admission on 03/25/2017   Component Date Value    WBC 03/25/2017 10 91*    RBC 03/25/2017 4 06  Hemoglobin 03/25/2017 12 7     Hematocrit 03/25/2017 38 8     MCV 03/25/2017 96     MCH 03/25/2017 31 3     MCHC 03/25/2017 32 7     RDW 03/25/2017 13 1     MPV 03/25/2017 11 0     Platelets 35/30/8975 314     nRBC 03/25/2017 0     Neutrophils Relative 03/25/2017 80*    Lymphocytes Relative 03/25/2017 14     Monocytes Relative 03/25/2017 6     Eosinophils Relative 03/25/2017 0     Basophils Relative 03/25/2017 0     Neutrophils Absolute 03/25/2017 8 68*    Lymphocytes Absolute 03/25/2017 1 52     Monocytes Absolute 03/25/2017 0 64     Eosinophils Absolute 03/25/2017 0 03     Basophils Absolute 03/25/2017 0 04     Protime 03/25/2017 13 1     INR 03/25/2017 0 99     PTT 03/25/2017 27     Sodium 03/25/2017 142     Potassium 03/25/2017 3 5     Chloride 03/25/2017 104     CO2 03/25/2017 30     Anion Gap 03/25/2017 8     BUN 03/25/2017 12     Creatinine 03/25/2017 0 88     Glucose 03/25/2017 127     Calcium 03/25/2017 8 5     AST 03/25/2017 34     ALT 03/25/2017 36     Alkaline Phosphatase 03/25/2017 141*    Total Protein 03/25/2017 8 4*    Albumin 03/25/2017 3 7     Total Bilirubin 03/25/2017 0 25     eGFR 03/25/2017 >60 0     Lipase 03/25/2017 218     LACTIC ACID 03/25/2017 1 8      Imaging Studies: There is no recent imaging studies  ASSESSMENT:    Principal Problem:    Lower GI bleed  Active Problems:    Diverticulosis of colon    Hypertension      23-year-old female admitted for rectal bleeding likely secondary to diverticulosis bleeding  PLAN:     Diverticulosis bleeding:  - Monitor hemoglobin very closely  - If patient has ongoing bleeding, we would repeat a colonoscopy  If patient's hemoglobin is stable and bleeding stopped, we will plan for outpatient colonoscopy for screening purposes  - Plan discussed with Dr Gregoria Gunter

## 2017-03-25 NOTE — PLAN OF CARE
Problem: Potential for Falls  Goal: Patient will remain free of falls  INTERVENTIONS:  - Assess patient frequently for physical needs  - Identify cognitive and physical deficits and behaviors that affect risk of falls    - [place] fall precautions as indicated by assessment   - Educate patient/family on patient safety including physical limitations  - Instruct patient to call for assistance with activity based on assessment  - Modify environment to reduce risk of injury  - Consider OT/PT consult to assist with strengthening/mobility   Outcome: Progressing    Problem: PAIN - ADULT  Goal: Verbalizes/displays adequate comfort level or baseline comfort level  Interventions:  - Encourage patient to monitor pain and request assistance  - Assess pain using appropriate pain scale  - Administer analgesics based on type and severity of pain and evaluate response  - Implement non-pharmacological measures as appropriate and evaluate response  - Consider cultural and social influences on pain and pain management  - Notify physician/advanced practitioner if interventions unsuccessful or patient reports new pain   Outcome: Progressing    Problem: SAFETY ADULT  Goal: Maintain or return to baseline ADL function  INTERVENTIONS:  - Assess patients ability to carry out ADLs; assess patients baseline for ADL function and identify physical deficits which impact ability to perform ADLs (bathing, care of mouth/teeth, toileting, grooming, dressing, etc )  - Assess/evaluate cause of self-care deficits   - Assess range of motion  - Assess patients mobility; develop plan if impaired  - Assess patients need for assistive devices and provide as appropriate  - Encourage maximum independence but intervene and supervise when necessary  ¯ Involve family in performance of ADLs  ¯ Assess for home care needs following discharge   ¯ Request OT consult to assist with ADL evaluation and planning for discharge  ¯ Provide patient education as appropriate   Outcome: Progressing  Goal: Maintain or return mobility status to optimal level  INTERVENTIONS:  - Assess patients baseline mobility status (ambulation, transfers, stairs, etc )   - Identify cognitive and physical deficits and behaviors that affect mobility  - Identify mobility aids required to assist with transfers and/or ambulation (gait belt, sit-to-stand, lift, walker, cane, etc )  - [place] fall precautions as indicated by assessment  - Record patient progress and toleration of activity level on Mobility SBAR; progress patient to next Phase/Stage  - Instruct patient to call for assistance with activity based on assessment  - Request Rehabilitation consult to assist with strengthening/weightbearing, etc    Outcome: Progressing    Problem: DISCHARGE PLANNING  Goal: Discharge to home or other facility with appropriate resources  INTERVENTIONS:  - Identify barriers to discharge w/patient and caregiver  - Arrange for needed discharge resources and transportation as appropriate  - Identify discharge learning needs (meds, wound care, etc )  - Arrange for interpretive services to assist at discharge as needed  - Refer to Case Management Department for coordinating discharge planning if the patient needs post-hospital services based on physician/advanced practitioner order or complex needs related to functional status, cognitive ability, or social support system   Outcome: Progressing    Problem: Knowledge Deficit  Goal: Patient/family/caregiver demonstrates understanding of disease process, treatment plan, medications, and discharge instructions  Complete learning assessment and assess knowledge base    Interventions:  - Provide teaching at level of understanding  - Provide teaching via preferred learning methods   Outcome: Progressing    Problem: HEMATOLOGIC - ADULT  Goal: Maintains hematologic stability  INTERVENTIONS  - Assess for signs and symptoms of bleeding or hemorrhage  - Monitor labs  - Administer supportive blood products/factors as ordered and appropriate   Outcome: Progressing

## 2017-03-25 NOTE — H&P
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  · Asymptomatic currently  Benign abdominal exam and pt is pain free  · With 6 episodes of dark red blood per rectum mixed with brown stool  Additional episode in ED  · Sigmoidoscopy performed last admission 1/27/17 which revealed hemorrhoids and diverticulosis in the sigmoid, descending colon, and extending up to transverse colon  · Colonoscopy set up for early February however canceled and has not rescheduled  · Hemoglobin stable at 12 7  We'll continue to check hemoglobin every 12 hours  · We'll place on IV Protonix BID  · Spoke with Dr Kellen Centeno suggesting to obtain CT of abdomen and pelvis with IV and PO contrast and lactate  · Consult GI for further workup/interventions  Additional Problems:   · Essential hypertension: Regimen of enalapril to 40 mg daily  BP initially elevated upon arrival at 193/93 has trended down since without intervention to 148/96  · Sinus tachycardia: HR ranging 106-118  Most likely secondary to dehydration  Denies any chest pain, pleuritic chest pain, shortness of breath  Will provide gentle fluid hydration at 60 ml/hr  · History of constipation: She has had trouble with constipation in the past however takes a stool softener daily along with fiber and has not had any recent episodes       · Diverticulosis of colon: Evidenced by sigmoidoscopy January 2017 with diverticulosis in the sigmoid, descending colon, and extending up to transverse colon  Most likely contributing to current bleed  · Elevated alkaline phosphatase: 141  Previous from 1/26/17 was 125  Unclear etiology  VTE Prophylaxis: Enoxaparin (Lovenox)  / sequential compression device   Code Status: full code  Anticipated Length of Stay:  Patient will be admitted on an Inpatient basis with an anticipated length of stay of  Greater than 2 midnights  Justification for Hospital Stay: Lower GI bleed requiring further monitoring    Chief Complaint:   Bleeding per rectum    History of Present Illness:    Harriet Pina is a 78 y o  female with a past medical history of hypertension and diverticulosis who is a [employer] and will not receive blood products  She presents after six bloody bowel movements at home  Patient states she started with dark red blood per rectum mixed with brown diarrhea around 9 AM  Clots were present as well  Prior to today her last bowel movement was yesterday with formed brown stool  No previous diarrhea or constipation  No nausea or vomiting, abdominal pain, dizziness, or lightheadedness  No fevers or chills  No recent illnesses or sick contacts  Non smoker, non drinker, no drug abuse  Please note patient was admitted 1/26/17 for similar episodes of blood per rectum, at that time it was bright red  She saw GI in consultation who performed a sigmoidoscopy which revealed diverticulosis in the sigmoid and descending colon extending up to transverse colon  Hemorrhoids were also seen on retroflexion  Her hgb remained stable and was d/c home to follow up as an outpt for a colonoscopy given none previously  Patient did see GI in office as an outpt and had colonoscopy scheduled for 2/14/17 however canceled it  Has not set up another one yet  Review of Systems:    General:   No Fever or chills; No significant weight loss or gain  EENT:   No ear pain, facial swelling; No sneezing, sore throat  Skin:   No rashes, color changes  Respiratory:     No shortness of breath, cough, wheezing, stridor  Cardiovascular:     No chest pain, palpitations  Gastrointestinal:    No nausea, vomiting, diarrhea; No abdominal pain  Musculoskeletal:     No arthralgias, myalgias, swelling  Neurologic:   No dizziness, numbness, weakness  No speech difficulties  Psych:   No agitation, suicidal ideations  Otherwise, All other twelve-point review of systems normal      Past Medical and Surgical History:     Past Medical History:   Diagnosis Date    Arthritis     Diverticulosis of colon     Hx of bleeding disorder     rectal bleeding    Hypertension        Past Surgical History:   Procedure Laterality Date    COLONOSCOPY N/A 1/27/2017    Procedure: COLONOSCOPY;  Surgeon: Angelika Duran MD;  Location: [facility]; Service:     TUBAL LIGATION         Meds/Allergies:    No current facility-administered medications for this encounter  No Known Allergies    Allergies: No Known Allergies    Social History:     Marital Status: Single     Substance Use History:   History   Alcohol Use No     History   Smoking Status    Never Smoker   Smokeless Tobacco    Not on file     History   Drug Use No       Family History:    non-contributory    Physical Exam:     Vitals:   Blood Pressure: 150/80 (03/25/17 1325)  Pulse: (!) 119 (03/25/17 1325)  Temperature: (!) 96 2 °F (35 7 °C) (03/25/17 1325)  Temp Source: Tympanic (03/25/17 1325)  Respirations: 16 (03/25/17 1325)  Height: 5' 7" (170 2 cm) (03/25/17 1325)  Weight - Scale: 70 9 kg (156 lb 4 9 oz) (03/25/17 1325)  SpO2: 99 % (03/25/17 1325)    Physical Exam   Constitutional: She is oriented to person, place, and time  She appears well-developed and well-nourished  No distress  HENT:   Head: Normocephalic and atraumatic  Eyes: Conjunctivae are normal    Cardiovascular: Normal rate, regular rhythm and normal heart sounds  Pulmonary/Chest: Breath sounds normal  No respiratory distress  She has no wheezes  She has no rales  She exhibits no tenderness  Abdominal: Soft  Bowel sounds are normal  She exhibits no distension and no mass  There is no tenderness  There is no rebound and no guarding  Musculoskeletal: She exhibits no edema  Neurological: She is alert and oriented to person, place, and time  Skin: Skin is warm and dry  She is not diaphoretic  No pallor  Nursing note and vitals reviewed  Additional Data:     Lab Results: I have personally reviewed pertinent reports  Results from last 7 days  Lab Units 03/25/17  1131   WBC Thousand/uL 10 91*   HEMOGLOBIN g/dL 12 7   HEMATOCRIT % 38 8   PLATELETS Thousands/uL 314   NEUTROS PCT % 80*   LYMPHS PCT % 14   MONOS PCT % 6   EOS PCT % 0       Results from last 7 days  Lab Units 03/25/17  1131   SODIUM mmol/L 142   POTASSIUM mmol/L 3 5   CHLORIDE mmol/L 104   CO2 mmol/L 30   BUN mg/dL 12   CREATININE mg/dL 0 88   CALCIUM mg/dL 8 5   TOTAL PROTEIN g/dL 8 4*   BILIRUBIN TOTAL mg/dL 0 25   ALK PHOS U/L 141*   ALT U/L 36   AST U/L 34   GLUCOSE RANDOM mg/dL 127       Results from last 7 days  Lab Units 03/25/17  1131   INR  0 99       Imaging: I have personally reviewed pertinent reports  No results found  [employer] Records Reviewed: Yes     Total Time for Visit, including Counseling / Coordination of Care: 45 minutes  Greater than 50% of this total time spent on direct patient counseling and coordination of care  ** Please Note: Dragon 360 Dictation voice to text software may have been used in the creation of this document   **

## 2017-03-26 ENCOUNTER — ANESTHESIA EVENT (INPATIENT)
Dept: GASTROENTEROLOGY | Facility: HOSPITAL | Age: 67
DRG: 378 | End: 2017-03-26
Payer: MEDICARE

## 2017-03-26 ENCOUNTER — ANESTHESIA (INPATIENT)
Dept: GASTROENTEROLOGY | Facility: HOSPITAL | Age: 67
DRG: 378 | End: 2017-03-26
Payer: MEDICARE

## 2017-03-26 ENCOUNTER — GENERIC CONVERSION - ENCOUNTER (OUTPATIENT)
Dept: OTHER | Facility: OTHER | Age: 67
End: 2017-03-26

## 2017-03-26 PROBLEM — D62 ACUTE BLOOD LOSS ANEMIA: Status: ACTIVE | Noted: 2017-03-26

## 2017-03-26 PROBLEM — R55 SYNCOPE AND COLLAPSE: Status: ACTIVE | Noted: 2017-03-26

## 2017-03-26 LAB
ALBUMIN SERPL BCP-MCNC: 2.8 G/DL (ref 3.5–5)
ALP SERPL-CCNC: 89 U/L (ref 46–116)
ALT SERPL W P-5'-P-CCNC: 26 U/L (ref 12–78)
ANION GAP SERPL CALCULATED.3IONS-SCNC: 6 MMOL/L (ref 4–13)
AST SERPL W P-5'-P-CCNC: 23 U/L (ref 5–45)
BILIRUB SERPL-MCNC: 0.29 MG/DL (ref 0.2–1)
BUN SERPL-MCNC: 13 MG/DL (ref 5–25)
CALCIUM SERPL-MCNC: 7.6 MG/DL (ref 8.3–10.1)
CHLORIDE SERPL-SCNC: 109 MMOL/L (ref 100–108)
CO2 SERPL-SCNC: 29 MMOL/L (ref 21–32)
CREAT SERPL-MCNC: 0.78 MG/DL (ref 0.6–1.3)
ERYTHROCYTE [DISTWIDTH] IN BLOOD BY AUTOMATED COUNT: 13.3 % (ref 11.6–15.1)
GFR SERPL CREATININE-BSD FRML MDRD: >60 ML/MIN/1.73SQ M
GGT SERPL-CCNC: 68 U/L (ref 5–85)
GLUCOSE SERPL-MCNC: 112 MG/DL (ref 65–140)
HCT VFR BLD AUTO: 25.9 % (ref 34.8–46.1)
HCT VFR BLD AUTO: 26.2 % (ref 34.8–46.1)
HGB BLD-MCNC: 8.3 G/DL (ref 11.5–15.4)
HGB BLD-MCNC: 8.5 G/DL (ref 11.5–15.4)
HGB BLD-MCNC: 8.6 G/DL (ref 11.5–15.4)
MCH RBC QN AUTO: 31.2 PG (ref 26.8–34.3)
MCHC RBC AUTO-ENTMCNC: 32.8 G/DL (ref 31.4–37.4)
MCV RBC AUTO: 95 FL (ref 82–98)
PLATELET # BLD AUTO: 267 THOUSANDS/UL (ref 149–390)
PMV BLD AUTO: 10.5 FL (ref 8.9–12.7)
POTASSIUM SERPL-SCNC: 3.7 MMOL/L (ref 3.5–5.3)
PROT SERPL-MCNC: 6.5 G/DL (ref 6.4–8.2)
RBC # BLD AUTO: 2.76 MILLION/UL (ref 3.81–5.12)
SODIUM SERPL-SCNC: 144 MMOL/L (ref 136–145)
WBC # BLD AUTO: 14.42 THOUSAND/UL (ref 4.31–10.16)

## 2017-03-26 PROCEDURE — 85027 COMPLETE CBC AUTOMATED: CPT | Performed by: PHYSICIAN ASSISTANT

## 2017-03-26 PROCEDURE — 85018 HEMOGLOBIN: CPT | Performed by: INTERNAL MEDICINE

## 2017-03-26 PROCEDURE — 85018 HEMOGLOBIN: CPT | Performed by: NURSE PRACTITIONER

## 2017-03-26 PROCEDURE — 82977 ASSAY OF GGT: CPT | Performed by: INTERNAL MEDICINE

## 2017-03-26 PROCEDURE — 80053 COMPREHEN METABOLIC PANEL: CPT | Performed by: PHYSICIAN ASSISTANT

## 2017-03-26 PROCEDURE — 85014 HEMATOCRIT: CPT | Performed by: INTERNAL MEDICINE

## 2017-03-26 PROCEDURE — C9113 INJ PANTOPRAZOLE SODIUM, VIA: HCPCS | Performed by: PHYSICIAN ASSISTANT

## 2017-03-26 PROCEDURE — 0W3P8ZZ CONTROL BLEEDING IN GASTROINTESTINAL TRACT, VIA NATURAL OR ARTIFICIAL OPENING ENDOSCOPIC: ICD-10-PCS | Performed by: INTERNAL MEDICINE

## 2017-03-26 RX ORDER — PROPOFOL 10 MG/ML
INJECTION, EMULSION INTRAVENOUS AS NEEDED
Status: DISCONTINUED | OUTPATIENT
Start: 2017-03-26 | End: 2017-03-26 | Stop reason: SURG

## 2017-03-26 RX ADMIN — PANTOPRAZOLE SODIUM 40 MG: 40 INJECTION, POWDER, FOR SOLUTION INTRAVENOUS at 09:22

## 2017-03-26 RX ADMIN — SODIUM CHLORIDE 75 ML/HR: 0.9 INJECTION, SOLUTION INTRAVENOUS at 05:45

## 2017-03-26 RX ADMIN — PROPOFOL 25 MG: 10 INJECTION, EMULSION INTRAVENOUS at 11:33

## 2017-03-26 RX ADMIN — IRON SUCROSE 200 MG: 20 INJECTION, SOLUTION INTRAVENOUS at 09:22

## 2017-03-26 RX ADMIN — DOCUSATE SODIUM 100 MG: 100 CAPSULE, LIQUID FILLED ORAL at 09:22

## 2017-03-26 RX ADMIN — PROPOFOL 25 MG: 10 INJECTION, EMULSION INTRAVENOUS at 11:27

## 2017-03-26 RX ADMIN — PROPOFOL 50 MG: 10 INJECTION, EMULSION INTRAVENOUS at 11:31

## 2017-03-26 RX ADMIN — POLYETHYLENE GLYCOL 3350, SODIUM SULFATE ANHYDROUS, SODIUM BICARBONATE, SODIUM CHLORIDE, POTASSIUM CHLORIDE 4000 ML: 236; 22.74; 6.74; 5.86; 2.97 POWDER, FOR SOLUTION ORAL at 00:55

## 2017-03-26 RX ADMIN — SODIUM CHLORIDE 75 ML/HR: 0.9 INJECTION, SOLUTION INTRAVENOUS at 18:38

## 2017-03-26 RX ADMIN — PROPOFOL 50 MG: 10 INJECTION, EMULSION INTRAVENOUS at 11:26

## 2017-03-26 RX ADMIN — LIDOCAINE HYDROCHLORIDE 100 MG: 20 INJECTION, SOLUTION INTRAVENOUS at 11:25

## 2017-03-26 RX ADMIN — PROPOFOL 25 MG: 10 INJECTION, EMULSION INTRAVENOUS at 11:36

## 2017-03-26 RX ADMIN — PANTOPRAZOLE SODIUM 40 MG: 40 INJECTION, POWDER, FOR SOLUTION INTRAVENOUS at 21:55

## 2017-03-26 RX ADMIN — PROPOFOL 25 MG: 10 INJECTION, EMULSION INTRAVENOUS at 11:38

## 2017-03-26 RX ADMIN — BISACODYL 10 MG: 5 TABLET, COATED ORAL at 00:57

## 2017-03-26 NOTE — PROGRESS NOTES
Pt was found on floor after hearing loud noise from outside the room  This staff person responded and called for help, vitals were obtained, pt denied pain, was assisted to sitting and lost consciousness for approx  20 secs  A rapid response was called  New orders obtained  Pt is back in bed call bell within reach, pt agreed to call for assistance  Bed alarm placed  Bed low and locked  Will continue to monitor

## 2017-03-26 NOTE — PROGRESS NOTES
I got a phone call from [facility] in ICU regarding the rapid response for the patient as patient suffered syncope after a bowel movement  Her hgb dropped to 9 7  Her BP was 102/40 and she was tachycardiac  Patient could not receive blood products she is a Jehovahs witness  I discussed with Doreen Holm if patient continues to have lower GI bleeding and becomes hemodynamically unstable, CTA and IR evaluation will be the next step, as there is no role for unprepped colonoscopy in patients with active lower GI bleeding  If CTA is negative for brisk bleeding, I would recommend to give bowel prep overnight with golytely and ducolax for colonoscopy tomorrow morning

## 2017-03-26 NOTE — RAPID RESPONSE
Progress Note - Rapid Response   Alfred Marrero 78 y o  female MRN: [id number]  Unit/Bed#: [place] [facility] 2 -02 Encounter: [id number]      Time Called: 2028  Arrival Time: 2029  Event End: 2044  MEWS score at time of Rapid Response: unknown  Primary reason for call: Other syncope and fall        ASSESSMENT/PLAN (Recommendation):      1  Syncope secondary to hypovolemia likely due to acute blood loss from GI bleed   Telemetry x 24 hours to monitor for arrythmia   Neuro checks Q2h  2  Hypovolemia due to acute blood loss anemia from lower GI bleed   1 liter NSS bolus   Check hemoglobin  Unfortunately patient is a jehovahs witness and does not accept blood products  3  Lower GI bleed: GI following    Interventions:  Airway/Breathing:  No Intervention  Circulation: IV Fluid Bolus  Other Treatments: N/A    CHIEF COMPLAINT (Situation): patient was in bathroom when she had a syncopal episode  She does not remember the falling  Currently she feels a little lightheaded but otherwise feels ok  HPI Statement (Background): HPI: Alfred Marrero is a 78y o  year old female who presents with Lower GI bleed  Seen by GI this afternoon and is planned for a colonoscopy on Monday  Historical Information   Past Medical History:   Diagnosis Date    Arthritis     Diverticulosis of colon     Hx of bleeding disorder     rectal bleeding    Hypertension      Past Surgical History:   Procedure Laterality Date    COLONOSCOPY N/A 1/27/2017    Procedure: COLONOSCOPY;  Surgeon: Alon Farris MD;  Location: [facility];   Service:     TUBAL LIGATION       Social History   History   Alcohol Use No     History   Drug Use No     History   Smoking Status    Never Smoker   Smokeless Tobacco    Not on file     Family History: non-contributory    Meds/Allergies     Current Facility-Administered Medications:     acetaminophen (TYLENOL) tablet 650 mg, 650 mg, Oral, Q6H PRN    calcium carbonate (TUMS) chewable tablet 1,000 mg, 1,000 mg, Oral, Daily PRN    docusate sodium (COLACE) capsule 100 mg, 100 mg, Oral, BID    enalapril (VASOTEC) tablet 40 mg, 40 mg, Oral, Daily    iohexol (OMNIPAQUE) 240 MG/ML solution 50 mL, 50 mL, Oral, 90 min pre-procedure    ondansetron (ZOFRAN) injection 4 mg, 4 mg, Intravenous, Q6H PRN    pantoprazole (PROTONIX) injection 40 mg, 40 mg, Intravenous, Q12H APPLE, 40 mg at 03/25/17 1519    sodium chloride 0 9 % bolus 1,000 mL, 1,000 mL, Intravenous, Once    sodium chloride 0 9 % infusion, 60 mL/hr, Intravenous, Continuous, 60 mL/hr at 03/25/17 1435  No Known Allergies    ROS: Negative except BRBPR, lightheaded   Denies chest pain, change in vision, headache, palpitations    PHYSICAL EXAM (Assessment):   /65  Pulse 82  Temp (!) 96 °F (35 6 °C) (Tympanic)   Resp 16  Ht 5' 7" (1 702 m)  Wt 70 9 kg (156 lb 4 9 oz)  SpO2 99%  BMI 24 48 kg/m2  /65  Pulse 82  Temp (!) 96 °F (35 6 °C) (Tympanic)   Resp 16  Ht 5' 7" (1 702 m)  Wt 70 9 kg (156 lb 4 9 oz)  SpO2 99%  BMI 24 48 kg/m2    Hemodynamic Monitoring: N/A    General Appearance:    Alert, cooperative, no distress, appears stated age   Head:    Normocephalic, without obvious abnormality, atraumatic   Eyes:    PERRL, conjunctiva/corneas clear, EOM's intact, fundi     benign, both eyes   Ears:    Normal TM's and external ear canals, both ears   Nose:   Nares normal, septum midline, mucosa normal, no drainage    or sinus tenderness   Throat:   Lips, mucosa, and tongue normal; teeth and gums normal   Neck:   Supple, symmetrical, trachea midline, no adenopathy;     thyroid:  no enlargement/tenderness/nodules; no carotid    bruit or JVD   Back:     Symmetric, no curvature, ROM normal, no CVA tenderness   Lungs:     Clear to auscultation bilaterally, respirations unlabored   Chest Wall:    No tenderness or deformity    Heart:    Regular rate and rhythm, S1 and S2 normal, no murmur, rub   or gallop   Breast Exam:    No tenderness, masses, or nipple abnormality Abdomen:     Soft, non-tender, bowel sounds active all four quadrants,     no masses, no organomegaly   Genitalia:    Normal female without lesion, discharge or tenderness   Rectal:  Bright red blood per rectum   Extremities:   Extremities normal, atraumatic, no cyanosis or edema   Pulses:   2+ and symmetric all extremities   Skin:   Skin color, texture, turgor normal, no rashes or lesions   Lymph nodes:   Cervical, supraclavicular, and axillary nodes normal   Neurologic:   CNII-XII intact, normal strength, sensation and reflexes     throughout       No intake or output data in the 24 hours ending 03/25/17 2043    Invasive Devices     Peripheral Intravenous Line            Peripheral IV 03/25/17 Right Antecubital less than 1 day                DIAGNOSTIC DATA:    Lab Results:   CBC:   Lab Results   Component Value Date    WBC 10 91 (H) 03/25/2017    HGB 12 7 03/25/2017    HCT 38 8 03/25/2017    MCV 96 03/25/2017     03/25/2017    MCH 31 3 03/25/2017    MCHC 32 7 03/25/2017    RDW 13 1 03/25/2017    MPV 11 0 03/25/2017    NRBC 0 03/25/2017       Imaging Studies: I have personally reviewed pertinent reports  EKG, Pathology, and Other Studies: I have personally reviewed pertinent reports  OUTCOME:   Stayed in room   Code Status: Level 1 - Full Code  Advance Directive and Living Will:     Power of :    Counseling / Coordination of Care  Total time spent today 20 minutes  Greater than 50% of total time was spent with the patient and / or family counseling and / or coordination of care   A description of the counseling / coordination of care: 0

## 2017-03-26 NOTE — PROGRESS NOTES
[facility] Progress Note  Patient: Ira Zuniga 78 y o  female   MRN: [id number]  PCP: No primary care provider on file  Unit/Bed#: ICU 04 Encounter: [id number]  Date Of Visit: 17    Assessment and plan:    Principal Problem:    Lower GI bleed- likely diverticular source  Had brisk bleeding last night which has cleared today  For colonoscopy today per GI  Active Problems:    Acute blood loss anemia- continue supportive care with IV fluid hydration plus minus IV albumin  She is a [employer]oration[employer] and does not accept blood  Give Venofer 200 mg daily    Syncope and collapse-secondary to lower GI bleed, transient hypotension, vagal episode  This has resolved  She is hemodynamically stable    Hypertension-hold enalapril for today  Watch for hypotension    Mild elevated alkaline phosphatase- GGT pending        VTE Pharmacologic Prophylaxis:   Pharmacologic: none  Mechanical VTE Prophylaxis in Place: Yes    Discussions with Specialists or Other Care Team Provider: Spoke with Florinda Gomez the ICU nurse practitioner last night    Education and Discussions with Family / Patient: spoke with sister Devang Crawley and gave update    Time Spent for Care: 20 minutes  More than 50% of total time spent on counseling and coordination of care as described above  Current Length of Stay: 1 day(s)    Current Patient Status: Inpatient   Certification Statement: The patient will continue to require additional inpatient hospital stay due to lower gi bleed, colonoscopy, anemia    Discharge Plan: probably in 24-48 hours depending on results today    Code Status: Level 1 - Full Code      Subjective:   She had brisk painless bleeding last night and had a syncopal episode  She was transferred to the ICU for closer observation  She tolerated the GoLYTELY prep and her last bowel movements have been clear  She denies lightheadedness, dizziness or shortness of breath      Objective:     Vitals:   Temp (24hrs), Av °F (36 1 °C), Min:96 °F (35 6 °C), Max:98 3 °F (36 8 °C)    HR:  [] 84  Resp:  [16-30] 21  BP: (102-193)/(56-96) 151/61  SpO2:  [94 %-100 %] 98 %  Body mass index is 24 72 kg/(m^2)  Input and Output Summary (last 24 hours): Intake/Output Summary (Last 24 hours) at 03/26/17 0819  Last data filed at 03/26/17 0726   Gross per 24 hour   Intake          6851 66 ml   Output             2950 ml   Net          3901 66 ml       Physical Exam:     Physical Exam    Additional Data:     Labs:      Results from last 7 days  Lab Units 03/26/17  0431  03/25/17  1131   WBC Thousand/uL 14 42*  --  10 91*   HEMOGLOBIN g/dL 8 6*  8 5*  < > 12 7   HEMATOCRIT % 26 2*  --  38 8   PLATELETS Thousands/uL 267  --  314   NEUTROS PCT %  --   --  80*   LYMPHS PCT %  --   --  14   MONOS PCT %  --   --  6   EOS PCT %  --   --  0   < > = values in this interval not displayed  Results from last 7 days  Lab Units 03/26/17  0431   SODIUM mmol/L 144   POTASSIUM mmol/L 3 7   CHLORIDE mmol/L 109*   CO2 mmol/L 29   BUN mg/dL 13   CREATININE mg/dL 0 78   CALCIUM mg/dL 7 6*   TOTAL PROTEIN g/dL 6 5   BILIRUBIN TOTAL mg/dL 0 29   ALK PHOS U/L 89   ALT U/L 26   AST U/L 23   GLUCOSE RANDOM mg/dL 112       Results from last 7 days  Lab Units 03/25/17  1131   INR  0 99       * I Have Reviewed All Lab Data Listed Above  * Additional Pertinent Lab Tests Reviewed: All Labs Within Last 24 Hours Reviewed    Imaging:    Imaging Reports Reviewed Today Include: ct abdomen images reviewed  Final read pending      Recent Cultures (last 7 days):           Last 24 Hours Medication List:     docusate sodium 100 mg Oral BID   iohexol 50 mL Oral 90 min pre-procedure   pantoprazole 40 mg Intravenous Q12H [address]        Today, Patient Was Seen By: Tennille Steele MD    ** Please Note: Dragon 360 Dictation voice to text software may have been used in the creation of this document   **

## 2017-03-26 NOTE — OP NOTE
COLONOSCOPY    PROCEDURE: Colonoscopy/ Control of Bleeding (Hemoclip placement)    INDICATIONS: Rectal Bleeding    POST-OP DIAGNOSIS: See the impression below    SEDATION: Monitored anesthesia care, check anesthesia records    PHYSICAL EXAM:    /61  Pulse 84  Temp 99 3 °F (37 4 °C) (Tympanic)   Resp 21  Ht 5' 7" (1 702 m)  Wt 71 6 kg (157 lb 13 6 oz)  SpO2 98%  BMI 24 72 kg/m2  Body mass index is 24 72 kg/(m^2)  General: NAD  Heart: S1 & S2 normal, RRR  Lungs: CTA, No rales or rhonchi  Abdomen: Soft, nontender, nondistended, good bowel sounds    CONSENT:  Informed consent was obtained for the procedure, including sedation after explaining the risks and benefits of the procedure  Risks including but not limited to bleeding, perforation, infection, aspiration were discussed in detail  Also explained about less than 100%$ sensitivity with the exam and other alternatives  PREPARATION:   EKG tracing, pulse oximetry, blood pressure were monitored throughout the procedure  Patient was identified by myself both verbally and by visual inspection of ID band  DESCRIPTION:   Patient was placed in the left lateral decubitus position and was sedated with the above medication  Digital rectal examination was performed  The colonoscope was introduced in to the anal canal and advanced up to cecum, which was identified by the appendiceal orifice and IC valve  A careful inspection was made as the colonoscope was withdrawn, including a retroflexed view of the rectum; findings and interventions are described below  Appropriate photodocumentation was obtained  The quality of the colonic preparation was poor  FINDINGS:    Diverticulosis throughout the entire colon  Trace blood in the transverse colon  No active bleeding from diverticulosis  There is one small diverticulosis in the sigmoid colon with erythema around it  A clip was placed to prevent bleeding            IMPRESSIONS: Diverticulosis    RECOMMENDATIONS:    - Monitor closely  - Advance diet  - This is a second episode of diverticulosis bleeding  If patient has recurrent bleeding, will need EGD and surgery evaluation  COMPLICATIONS:  None; patient tolerated the procedure well      DISPOSITION: PACU           CONDITION: Stable

## 2017-03-26 NOTE — CASE MANAGEMENT
Initial Clinical Review    Admission: Date/Time/Statement: 3/25/17 @ 1204     Orders Placed This Encounter   Procedures    Inpatient Admission (expected length of stay for this patient is greater than two midnights)     Standing Status:   Standing     Number of Occurrences:   1     Order Specific Question:   Admitting Physician     Answer:   Danielle Law [[id number]]     Order Specific Question:   Level of Care     Answer:   Med Surg [[id number]]     Order Specific Question:   Estimated length of stay     Answer:   More than 2 Midnights     Order Specific Question:   Certification     Answer:   I certify that inpatient services are medically necessary for this patient for a duration of greater than two midnights  See H&P and MD Progress Notes for additional information about the patient's course of treatment  ED: Date/Time/Mode of Arrival:   ED Arrival Information     Expected Arrival Acuity Means of Arrival Escorted By Service Admission Type    - 3/25/2017 11:09 Urgent Walk-In Self General Medicine Urgent    Arrival Complaint    blood in stool           Chief Complaint:   Chief Complaint   Patient presents with    Black or Bloody Stool     Patient reports dark red blood in stool this morning  Denies any abdominal pain, N/V  Hx of diverticulitis, seen here 1/27 for the same per patient  History of Illness:    Ezra Perdomo is a 78 y o  female with a past medical history of hypertension and diverticulosis who is a [employer] and will not receive blood products  She presents after six bloody bowel movements at home  Patient states she started with dark red blood per rectum mixed with brown diarrhea around 9 AM  Clots were present as well  Prior to today her last bowel movement was yesterday with formed brown stool  No previous diarrhea or constipation  No nausea or vomiting, abdominal pain, dizziness, or lightheadedness  No fevers or chills  No recent illnesses or sick contacts   Non smoker, non drinker, no drug abuse      Please note patient was admitted 1/26/17 for similar episodes of blood per rectum, at that time it was bright red  She saw GI in consultation who performed a sigmoidoscopy which revealed diverticulosis in the sigmoid and descending colon extending up to transverse colon  Hemorrhoids were also seen on retroflexion  Her hgb remained stable and was d/c home to follow up as an outpt for a colonoscopy given none previously       Patient did see GI in office as an outpt and had colonoscopy scheduled for 2/14/17 however canceled it  Has not set up another one yet  ED Vital Signs:   ED Triage Vitals   Temperature Pulse Respirations Blood Pressure SpO2   03/25/17 1112 03/25/17 1112 03/25/17 1112 03/25/17 1112 03/25/17 1112   97 5 °F (36 4 °C) 106 20 193/93 98 %      Temp Source Heart Rate Source Patient Position BP Location FiO2 (%)   03/25/17 1112 03/25/17 1112 03/25/17 1310 03/25/17 1112 --   Temporal Monitor Sitting Right arm       Pain Score       03/25/17 1112       No Pain        Wt Readings from Last 1 Encounters:   03/25/17 71 6 kg (157 lb 13 6 oz)       Vital Signs (abnormal):    above    Abnormal Labs/Diagnostic Test Results:   CT abd/pelvis: The study is limited by pre-existing oral contrast within the colon which significantly limits sensitivity for detection of active bleeding  No large volume hemorrhage is seen within limitations  Diffuse clonic diverticulosis without evidence of diverticulitis  Fatty liver  WBC   10 91  Alk phos    141  Abs  neutro    8 68    H/H   8 6/26 2    (  3/26)    ED Treatment:   Medication Administration from 03/25/2017 1109 to 03/25/2017 1322     None          Past Medical/Surgical History:    Active Ambulatory Problems     Diagnosis Date Noted    Sinus tachycardia 01/27/2017    Lower GI bleed 01/27/2017    Diarrhea 01/27/2017    HTN (hypertension) 01/27/2017    Diverticulosis of colon     Blood in stool 03/25/2017     Resolved Ambulatory Problems Diagnosis Date Noted    No Resolved Ambulatory Problems     Past Medical History:   Diagnosis Date    Arthritis     Diverticulosis of colon     Hx of bleeding disorder     Hypertension        Admitting Diagnosis: Blood in stool [K92 1]  Rectal bleeding [K62 5]    Age/Sex: 78 y o  female    · Assessment/Plan:   Lower GI bleed  ¨ Patient is a [employer] and will not accept blood products  ¨ Asymptomatic currently  Benign abdominal exam and pt is pain free  ¨ With 6 episodes of dark red blood per rectum mixed with brown stool  Additional episode in ED  ¨ Sigmoidoscopy performed last admission 1/27/17 which revealed hemorrhoids and diverticulosis in the sigmoid, descending colon, and extending up to transverse colon  ¨ Colonoscopy set up for early February however canceled and has not rescheduled  ¨ Hemoglobin stable at 12 7  We'll continue to check hemoglobin every 12 hours  ¨ We'll place on IV Protonix BID  ¨ Spoke with Dr Priya Gavin suggesting to obtain CT of abdomen and pelvis with IV and PO contrast and lactate  ¨ Consult GI for further workup/interventions      Additional Problems:   · Essential hypertension: Regimen of enalapril to 40 mg daily  BP initially elevated upon arrival at 193/93 has trended down since without intervention to 148/96      · Sinus tachycardia: HR ranging 106-118  Most likely secondary to dehydration  Denies any chest pain, pleuritic chest pain, shortness of breath  Will provide gentle fluid hydration at 60 ml/hr       · History of constipation: She has had trouble with constipation in the past however takes a stool softener daily along with fiber and has not had any recent episodes       · Diverticulosis of colon: Evidenced by sigmoidoscopy January 2017 with diverticulosis in the sigmoid, descending colon, and extending up to transverse colon  Most likely contributing to current bleed      · Elevated alkaline phosphatase: 141  Previous from 1/26/17 was 125   Unclear etiology  Anticipated Length of Stay: Patient will be admitted on an Inpatient basis with an anticipated length of stay of Greater than 2 midnights   Justification for Hospital Stay: Lower GI bleed requiring further monitoring             Admission Orders:    IP    3/25  @      5364  Scheduled Meds:   docusate sodium 100 mg Oral BID   iohexol 50 mL Oral 90 min pre-procedure   iron sucrose 200 mg Intravenous Daily   pantoprazole 40 mg Intravenous Q12H [address]     Continuous Infusions:   sodium chloride 75 mL/hr Last Rate: 75 mL/hr (03/26/17 0612)     PRN Meds:   acetaminophen    calcium carbonate    ondansetron     NPO  Cons  GI  Stool c/diff  Contact isolation  H/H  Q 8 hrs  IV   venofer

## 2017-03-26 NOTE — PLAN OF CARE
DISCHARGE PLANNING     Discharge to home or other facility with appropriate resources Progressing        HEMATOLOGIC - ADULT     Maintains hematologic stability Progressing        Knowledge Deficit     Patient/family/caregiver demonstrates understanding of disease process, treatment plan, medications, and discharge instructions Progressing        PAIN - ADULT     Verbalizes/displays adequate comfort level or baseline comfort level Progressing        Potential for Falls     Patient will remain free of falls Progressing        SAFETY ADULT     Maintain or return to baseline ADL function Progressing     Maintain or return mobility status to optimal level Progressing

## 2017-03-27 ENCOUNTER — GENERIC CONVERSION - ENCOUNTER (OUTPATIENT)
Dept: OTHER | Facility: OTHER | Age: 67
End: 2017-03-27

## 2017-03-27 ENCOUNTER — ANESTHESIA (INPATIENT)
Dept: GASTROENTEROLOGY | Facility: HOSPITAL | Age: 67
DRG: 378 | End: 2017-03-27
Payer: MEDICARE

## 2017-03-27 ENCOUNTER — ANESTHESIA EVENT (INPATIENT)
Dept: GASTROENTEROLOGY | Facility: HOSPITAL | Age: 67
DRG: 378 | End: 2017-03-27
Payer: MEDICARE

## 2017-03-27 LAB
ANION GAP SERPL CALCULATED.3IONS-SCNC: 9 MMOL/L (ref 4–13)
BUN SERPL-MCNC: 8 MG/DL (ref 5–25)
CALCIUM SERPL-MCNC: 7.9 MG/DL (ref 8.3–10.1)
CHLORIDE SERPL-SCNC: 110 MMOL/L (ref 100–108)
CO2 SERPL-SCNC: 26 MMOL/L (ref 21–32)
CREAT SERPL-MCNC: 0.72 MG/DL (ref 0.6–1.3)
GFR SERPL CREATININE-BSD FRML MDRD: >60 ML/MIN/1.73SQ M
GLUCOSE SERPL-MCNC: 91 MG/DL (ref 65–140)
HCT VFR BLD AUTO: 20.4 % (ref 34.8–46.1)
HCT VFR BLD AUTO: 22.4 % (ref 34.8–46.1)
HCT VFR BLD AUTO: 22.8 % (ref 34.8–46.1)
HCT VFR BLD AUTO: 24.8 % (ref 34.8–46.1)
HGB BLD-MCNC: 6.6 G/DL (ref 11.5–15.4)
HGB BLD-MCNC: 7.2 G/DL (ref 11.5–15.4)
HGB BLD-MCNC: 7.4 G/DL (ref 11.5–15.4)
HGB BLD-MCNC: 8 G/DL (ref 11.5–15.4)
POTASSIUM SERPL-SCNC: 3.1 MMOL/L (ref 3.5–5.3)
SODIUM SERPL-SCNC: 145 MMOL/L (ref 136–145)

## 2017-03-27 PROCEDURE — 0DJD8ZZ INSPECTION OF LOWER INTESTINAL TRACT, VIA NATURAL OR ARTIFICIAL OPENING ENDOSCOPIC: ICD-10-PCS | Performed by: INTERNAL MEDICINE

## 2017-03-27 PROCEDURE — 85014 HEMATOCRIT: CPT | Performed by: INTERNAL MEDICINE

## 2017-03-27 PROCEDURE — 85014 HEMATOCRIT: CPT | Performed by: PHYSICIAN ASSISTANT

## 2017-03-27 PROCEDURE — 85018 HEMOGLOBIN: CPT | Performed by: INTERNAL MEDICINE

## 2017-03-27 PROCEDURE — 0D568ZZ DESTRUCTION OF STOMACH, VIA NATURAL OR ARTIFICIAL OPENING ENDOSCOPIC: ICD-10-PCS | Performed by: INTERNAL MEDICINE

## 2017-03-27 PROCEDURE — 80048 BASIC METABOLIC PNL TOTAL CA: CPT | Performed by: INTERNAL MEDICINE

## 2017-03-27 PROCEDURE — C9113 INJ PANTOPRAZOLE SODIUM, VIA: HCPCS | Performed by: PHYSICIAN ASSISTANT

## 2017-03-27 PROCEDURE — 85018 HEMOGLOBIN: CPT | Performed by: PHYSICIAN ASSISTANT

## 2017-03-27 RX ORDER — POTASSIUM CHLORIDE, DEXTROSE MONOHYDRATE AND SODIUM CHLORIDE 300; 5; 900 MG/100ML; G/100ML; MG/100ML
75 INJECTION, SOLUTION INTRAVENOUS CONTINUOUS
Status: DISCONTINUED | OUTPATIENT
Start: 2017-03-27 | End: 2017-03-30 | Stop reason: HOSPADM

## 2017-03-27 RX ORDER — SODIUM CHLORIDE 9 MG/ML
INJECTION, SOLUTION INTRAVENOUS CONTINUOUS PRN
Status: DISCONTINUED | OUTPATIENT
Start: 2017-03-27 | End: 2017-03-27 | Stop reason: SURG

## 2017-03-27 RX ORDER — PROPOFOL 10 MG/ML
INJECTION, EMULSION INTRAVENOUS AS NEEDED
Status: DISCONTINUED | OUTPATIENT
Start: 2017-03-27 | End: 2017-03-27 | Stop reason: SURG

## 2017-03-27 RX ORDER — SODIUM CHLORIDE 9 MG/ML
125 INJECTION, SOLUTION INTRAVENOUS CONTINUOUS
Status: DISCONTINUED | OUTPATIENT
Start: 2017-03-27 | End: 2017-03-30 | Stop reason: HOSPADM

## 2017-03-27 RX ORDER — ALBUMIN, HUMAN INJ 5% 5 %
12.5 SOLUTION INTRAVENOUS ONCE
Status: COMPLETED | OUTPATIENT
Start: 2017-03-27 | End: 2017-03-27

## 2017-03-27 RX ADMIN — PROPOFOL 40 MG: 10 INJECTION, EMULSION INTRAVENOUS at 15:35

## 2017-03-27 RX ADMIN — PROPOFOL 120 MG: 10 INJECTION, EMULSION INTRAVENOUS at 15:30

## 2017-03-27 RX ADMIN — LIDOCAINE HYDROCHLORIDE 50 MG: 20 INJECTION, SOLUTION INTRAVENOUS at 15:30

## 2017-03-27 RX ADMIN — PROPOFOL 40 MG: 10 INJECTION, EMULSION INTRAVENOUS at 15:39

## 2017-03-27 RX ADMIN — DOCUSATE SODIUM 100 MG: 100 CAPSULE, LIQUID FILLED ORAL at 17:20

## 2017-03-27 RX ADMIN — IRON SUCROSE 200 MG: 20 INJECTION, SOLUTION INTRAVENOUS at 09:30

## 2017-03-27 RX ADMIN — BISACODYL 10 MG: 5 TABLET, COATED ORAL at 17:20

## 2017-03-27 RX ADMIN — PROPOFOL 50 MG: 10 INJECTION, EMULSION INTRAVENOUS at 15:44

## 2017-03-27 RX ADMIN — PANTOPRAZOLE SODIUM 40 MG: 40 INJECTION, POWDER, FOR SOLUTION INTRAVENOUS at 20:20

## 2017-03-27 RX ADMIN — PROPOFOL 50 MG: 10 INJECTION, EMULSION INTRAVENOUS at 15:48

## 2017-03-27 RX ADMIN — POLYETHYLENE GLYCOL 3350, SODIUM SULFATE ANHYDROUS, SODIUM BICARBONATE, SODIUM CHLORIDE, POTASSIUM CHLORIDE 4000 ML: 236; 22.74; 6.74; 5.86; 2.97 POWDER, FOR SOLUTION ORAL at 19:16

## 2017-03-27 RX ADMIN — ALBUMIN HUMAN 12.5 G: 0.05 INJECTION, SOLUTION INTRAVENOUS at 14:16

## 2017-03-27 RX ADMIN — SODIUM CHLORIDE: 0.9 INJECTION, SOLUTION INTRAVENOUS at 15:27

## 2017-03-27 RX ADMIN — POTASSIUM CHLORIDE, DEXTROSE MONOHYDRATE AND SODIUM CHLORIDE 75 ML/HR: 300; 5; 900 INJECTION, SOLUTION INTRAVENOUS at 14:14

## 2017-03-27 RX ADMIN — PANTOPRAZOLE SODIUM 40 MG: 40 INJECTION, POWDER, FOR SOLUTION INTRAVENOUS at 08:13

## 2017-03-27 RX ADMIN — SODIUM CHLORIDE 75 ML/HR: 0.9 INJECTION, SOLUTION INTRAVENOUS at 08:07

## 2017-03-27 NOTE — PROGRESS NOTES
[facility] Progress Note  Patient: Kenia Moss 78 y o  female   MRN: [id number]  PCP: No primary care provider on file  Unit/Bed#: [facility] -01 Encounter: [id number]  Date Of Visit: 17    Assessment:    Principal Problem:    Lower GI bleed  Active Problems:    Hypertension    Acute blood loss anemia    Syncope and collapse      Plan:    GI bleed, had colonoscopy which showed suspicious area of erythema that was clipped of presumed diverticular blood loss not definitive has had no active blood loss documented since albeit globin remains depressed at some point to an trending down  Continue to monitor  A need EGD if rebleeds  CTA of abdomen was diminished sensitivity due to retained contrast no large volume hemorrhage was noted however with diffuse diverticulosis noted    Acute blood loss anemia, 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 has been on daily IV iron and when necessary albumin remains symptomatic with lightheadedness on changing position being upright  Syncope and collapse presumably relation acute blood loss anemia and low volume    Essential hypertension, BP meds up and placed on hold due to orthostatic symptoms and hypertension              VTE Pharmacologic Prophylaxis:   Pharmacologic: Pharmacologic VTE Prophylaxis contraindicated due to GI bleed  Mechanical VTE Prophylaxis in Place: Yes    Discussions with Specialists or Other Care Team Provider:     Time Spent for Care: 20 minutes  More than 50% of total time spent on counseling and coordination of care as described above        Subjective:   Administered continued lightheadedness when upright denies chest pain denies abdominal pain denies any passage of bright red blood per rectum since scope    Objective:     Vitals:   Temp (24hrs), Av 2 °F (36 8 °C), Min:97 8 °F (36 6 °C), Max:98 6 °F (37 °C)    HR:  [78-92] 82  Resp:  [16-20] 17  BP: (135-161)/(52-67) 145/61  SpO2:  [91 %-99 %] 91 %  Body mass index is 24 72 kg/(m^2)  Input and Output Summary (last 24 hours): Intake/Output Summary (Last 24 hours) at 03/27/17 1316  Last data filed at 03/27/17 0745   Gross per 24 hour   Intake             1425 ml   Output              575 ml   Net              850 ml       Physical Exam:     Physical Exam:   General appearance: alert, appears stated age and cooperative  Head: Normocephalic, without obvious abnormality, atraumatic  Lungs: clear to auscultation bilaterally  Heart: regular rate and rhythm  Abdomen: soft, non-tender; bowel sounds normal; no masses,  no organomegaly  Back: negative  Extremities: extremities normal, atraumatic, no cyanosis or edema  Neurologic: Grossly normal      Additional Data:     Labs:      Results from last 7 days  Lab Units 03/27/17  1112  03/26/17  0431  03/25/17  1131   WBC Thousand/uL  --   --  14 42*  --  10 91*   HEMOGLOBIN g/dL 7 2*  < > 8 6*  8 5*  < > 12 7   HEMATOCRIT % 22 4*  < > 26 2*  --  38 8   PLATELETS Thousands/uL  --   --  267  --  314   NEUTROS PCT %  --   --   --   --  80*   LYMPHS PCT %  --   --   --   --  14   MONOS PCT %  --   --   --   --  6   EOS PCT %  --   --   --   --  0   < > = values in this interval not displayed  Results from last 7 days  Lab Units 03/27/17  0514 03/26/17  0431   SODIUM mmol/L 145 144   POTASSIUM mmol/L 3 1* 3 7   CHLORIDE mmol/L 110* 109*   CO2 mmol/L 26 29   BUN mg/dL 8 13   CREATININE mg/dL 0 72 0 78   CALCIUM mg/dL 7 9* 7 6*   TOTAL PROTEIN g/dL  --  6 5   BILIRUBIN TOTAL mg/dL  --  0 29   ALK PHOS U/L  --  89   ALT U/L  --  26   AST U/L  --  23   GLUCOSE RANDOM mg/dL 91 112       Results from last 7 days  Lab Units 03/25/17  1131   INR  0 99       * I Have Reviewed All Lab Data Listed Above  * Additional Pertinent Lab Tests Reviewed:  [facility] Admission Reviewed    Imaging:  Cta Abdomen Pelvis W Wo Contrast    Result Date: 3/26/2017  Narrative: CT ANGIOGRAM OF THE ABDOMEN AND PELVIS WITH AND WITHOUT IV CONTRAST INDICATION:  Rectal bleeding for 1 day  COMPARISON: None TECHNIQUE:  CT angiogram examination of the abdomen and pelvis was performed according to standard GI bleeding protocol  This examination, like all CT scans performed in the [facility], was performed utilizing techniques to minimize radiation dose exposure, including the use of iterative reconstruction and automated exposure control  Contrast as well as noncontrast images were obtained  3D reconstructions were performed an independent workstation, and are supplied for review  3D  volume rendered images of the aorta were also provided  Rad dose 794 41 mGy-cm IV Contrast:  100 mL of iohexol (OMNIPAQUE)    Enteric Contrast:  Enteric contrast is seen in the colon  FINDINGS: STOMACH AND BOWEL:  There is oral contrast material seen within the colon which significantly limits sensitivity for detection of GI bleeding in these areas  Within limitations of this study, there is no discernible high-volume active hemorrhage detected  Diffuse colonic diverticulosis without evidence of diverticulitis  VASCULAR STRUCTURES: No evidence of aortic aneurysm  The mesenteric vasculature is patent  Mild atherosclerotic changes are noted  OTHER FINDINGS ABDOMEN: LOWER CHEST:  No significant abnormality in the lung bases  LIVER/BILIARY TREE: Fatty infiltration of the liver  GALLBLADDER:  No calcified gallstones  No pericholecystic inflammatory change  SPLEEN:  Unremarkable  Small splenule left upper quadrant suggested  PANCREAS:  Unremarkable  ADRENAL GLANDS: Unremarkable  KIDNEYS/URETERS: One or more sharply circumscribed subcentimeter renal hypodensities are noted  These lesions are too small to accurately characterize, but are statistically most likely to represent benign cortical renal cyst(s)    According to the guidelines published in the [facility] Paper of the ACR Incidental Findings Committee (Radiology 2010), no further workup of these lesions is recommended  No hydronephrosis  PELVIS REPRODUCTIVE ORGANS:  Unremarkable for patient's age  URINARY BLADDER:  Unremarkable  ADDITIONAL ABDOMINAL AND PELVIC STRUCTURES: ABDOMINOPELVIC CAVITY:    No pathologically enlarged mesenteric or retroperitoneal lymph nodes  No ascites or free intraperitoneal air  Multiple phlebolith are seen along the course of the ovarian veins bilaterally  ABDOMINAL WALL/INGUINAL REGIONS:  Tiny fat-containing umbilical hernia  OSSEOUS STRUCTURES:   No acute fracture or destructive osseous lesion  Mild degenerative changes of the spine  Impression: The study is limited by pre-existing oral contrast within the colon which significantly limits sensitivity for detection of active bleeding  No large volume hemorrhage is seen within limitations  Diffuse clonic diverticulosis without evidence of diverticulitis  Fatty liver  Major findings are concordant with initial interpretation provided by Dr Elaine Early II of [facility]  Workstation performed: [id number]     Radiology Results    Result Date: 3/27/2017  Narrative: Ordered by an unspecified provider  Imaging Reports Reviewed Today Include: CT angiography of the abdomen and pelvis was reviewed  Imaging Personally Reviewed by Myself Includes:  No  Procedure: Cta Abdomen Pelvis W Wo Contrast    Result Date: 3/26/2017  Narrative: CT ANGIOGRAM OF THE ABDOMEN AND PELVIS WITH AND WITHOUT IV CONTRAST INDICATION:  Rectal bleeding for 1 day  COMPARISON: None TECHNIQUE:  CT angiogram examination of the abdomen and pelvis was performed according to standard GI bleeding protocol  This examination, like all CT scans performed in the [facility], was performed utilizing techniques to minimize radiation dose exposure, including the use of iterative reconstruction and automated exposure control  Contrast as well as noncontrast images were obtained    3D reconstructions were performed an independent workstation, and are supplied for review  3D  volume rendered images of the aorta were also provided  Rad dose 794 41 mGy-cm IV Contrast:  100 mL of iohexol (OMNIPAQUE)    Enteric Contrast:  Enteric contrast is seen in the colon  FINDINGS: STOMACH AND BOWEL:  There is oral contrast material seen within the colon which significantly limits sensitivity for detection of GI bleeding in these areas  Within limitations of this study, there is no discernible high-volume active hemorrhage detected  Diffuse colonic diverticulosis without evidence of diverticulitis  VASCULAR STRUCTURES: No evidence of aortic aneurysm  The mesenteric vasculature is patent  Mild atherosclerotic changes are noted  OTHER FINDINGS ABDOMEN: LOWER CHEST:  No significant abnormality in the lung bases  LIVER/BILIARY TREE: Fatty infiltration of the liver  GALLBLADDER:  No calcified gallstones  No pericholecystic inflammatory change  SPLEEN:  Unremarkable  Small splenule left upper quadrant suggested  PANCREAS:  Unremarkable  ADRENAL GLANDS: Unremarkable  KIDNEYS/URETERS: One or more sharply circumscribed subcentimeter renal hypodensities are noted  These lesions are too small to accurately characterize, but are statistically most likely to represent benign cortical renal cyst(s)  According to the guidelines published in the [facility] Paper of the ACR Incidental Findings Committee (Radiology 2010), no further workup of these lesions is recommended  No hydronephrosis  PELVIS REPRODUCTIVE ORGANS:  Unremarkable for patient's age  URINARY BLADDER:  Unremarkable  ADDITIONAL ABDOMINAL AND PELVIC STRUCTURES: ABDOMINOPELVIC CAVITY:    No pathologically enlarged mesenteric or retroperitoneal lymph nodes  No ascites or free intraperitoneal air  Multiple phlebolith are seen along the course of the ovarian veins bilaterally  ABDOMINAL WALL/INGUINAL REGIONS:  Tiny fat-containing umbilical hernia  OSSEOUS STRUCTURES:   No acute fracture or destructive osseous lesion   Mild degenerative changes of the spine  Impression: The study is limited by pre-existing oral contrast within the colon which significantly limits sensitivity for detection of active bleeding  No large volume hemorrhage is seen within limitations  Diffuse clonic diverticulosis without evidence of diverticulitis  Fatty liver  Major findings are concordant with initial interpretation provided by Dr Janneth Mendez II of [facility]  Workstation performed: [id number]     Procedure: Radiology Results    Result Date: 3/27/2017  Narrative: Ordered by an unspecified provider  Recent Cultures (last 7 days):           Last 24 Hours Medication List:     albumin human 12 5 g Intravenous Once   docusate sodium 100 mg Oral BID   iohexol 50 mL Oral 90 min pre-procedure   iron sucrose 200 mg Intravenous Daily   pantoprazole 40 mg Intravenous Q12H [address]        Today, Patient Was Seen By: Ericka Davila MD    ** Please Note: Dragon 360 Dictation voice to text software may have been used in the creation of this document   **

## 2017-03-27 NOTE — OP NOTE
OPERATIVE REPORT  PATIENT NAME: Yelitza Tya    :  1950  MRN: [id number]  Pt Location: [facility] ROOM 01    SURGERY DATE: 3/27/2017    Flexible Sigmoidoscopy Procedure Note    Procedure: Flexible Sigmoidoscopy    Sedation: Monitored anesthesia care, check anesthesia records      ASA Class: 3    INDICATIONS: Hematochezia    POST-OP DIAGNOSIS: See the impression below    Procedure Details     Informed consent was obtained for the procedure, including sedation  Risks of perforation, hemorrhage, adverse drug reaction and aspiration were discussed  The patient was placed in the left lateral decubitus position  Based on the pre-procedure assessment, including review of the patient's medical history, medications, allergies, and review of systems, she had been deemed to be an appropriate candidate for conscious sedation; she was therefore sedated with the medications listed below  The patient was monitored continuously with telemetry, pulse oximetry, blood pressure monitoring, and direct observations  A rectal examination was performed  The upper endoscope was inserted into the rectum and advanced under direct vision to the transverse colon  The quality of the colonic preparation was poor  A careful inspection was made as the colonoscope was withdrawn, including a retroflexed view of the rectum; findings and interventions are described below  Findings: There was blood seen throughout the examined colon from the transverse to the rectum  Retroflexed view in the rectum was unremarkable  Diverticulosis was seen in the sigmoid and descending colon  Complications:  None; patient tolerated the procedure well  Impression:    Blood was seen throughout the examined colon from the transverse colon to the rectum  A clear source of bleeding was not found as there is no active bleeding in the bowel preparation was very poor      Recommendations:  I will plan to reprep for colonoscopy tomorrow since a source for her hematochezia was not found during the upper endoscopy and since she is not had a well prepped colonoscopy yet      SIGNATURE: Danica Arora MD  DATE: March 27, 2017  TIME: 4:06 PM

## 2017-03-27 NOTE — SOCIAL WORK
CM attempted to meet with patient twice, first time patient was on commode the second time patient was off unit at the GI lab  CM will try again

## 2017-03-27 NOTE — OP NOTE
OPERATIVE REPORT  PATIENT NAME: Claire Tello    :  1950  MRN: [id number]  Pt Location: [facility] ROOM 01    SURGERY DATE: 3/27/2017    ESOPHAGOGASTRODUODENOSCOPY    PROCEDURE: EGD    SEDATION: Monitored anesthesia care, check anesthesia records    ASA Class: 3    INDICATIONS: Hematochezia with a clear source of bleeding not found during colonoscopy yesterday    CONSENT:  Informed consent was obtained for the procedure, including sedation after explaining the risks and benefits of the procedure  Risks including but not limited to bleeding, perforation, infection, and missed lesion  PREPARATION:   Telemetry, pulse oximetry, blood pressure were monitored throughout the procedure  Patient was identified by myself both verbally and by visual inspection of ID band  DESCRIPTION:   Patient was placed in the left lateral decubitus position and was sedated with the above medication  The gastroscope was introduced in to the oropharynx and the esophagus was intubated under direct visualization  Scope was passed down the esophagus up to 2nd part of the duodenum  A careful inspection was made as the gastroscope was withdrawn, including a retroflexed view of the stomach; findings and interventions are described below  FINDINGS:    #1  Esophagus- normal     #2  Stomach- there was evidence of mild gastric antral vascular ectasia (GAVE) and this was treated with argon plasma coagulation  There was also a small sliding hiatal hernia, but otherwise the retroflexed view was unremarkable  #3  Duodenum- normal          IMPRESSIONS:      Gastric antral vascular ectasia  Small sliding hiatal hernia    RECOMMENDATIONS:     Colonoscopy to follow    COMPLICATIONS:  None; patient tolerated the procedure well      SIGNATURE: Suma Ivy MD  DATE: 2017  TIME: 3:40 PM

## 2017-03-28 ENCOUNTER — ANESTHESIA (INPATIENT)
Dept: GASTROENTEROLOGY | Facility: HOSPITAL | Age: 67
DRG: 378 | End: 2017-03-28
Payer: MEDICARE

## 2017-03-28 ENCOUNTER — ANESTHESIA EVENT (INPATIENT)
Dept: GASTROENTEROLOGY | Facility: HOSPITAL | Age: 67
DRG: 378 | End: 2017-03-28
Payer: MEDICARE

## 2017-03-28 LAB
HCT VFR BLD AUTO: 20.3 % (ref 34.8–46.1)
HGB BLD-MCNC: 6.6 G/DL (ref 11.5–15.4)
POTASSIUM SERPL-SCNC: 2.7 MMOL/L (ref 3.5–5.3)

## 2017-03-28 PROCEDURE — 85014 HEMATOCRIT: CPT | Performed by: INTERNAL MEDICINE

## 2017-03-28 PROCEDURE — 84132 ASSAY OF SERUM POTASSIUM: CPT | Performed by: INTERNAL MEDICINE

## 2017-03-28 PROCEDURE — 85018 HEMOGLOBIN: CPT | Performed by: INTERNAL MEDICINE

## 2017-03-28 PROCEDURE — 0DJD8ZZ INSPECTION OF LOWER INTESTINAL TRACT, VIA NATURAL OR ARTIFICIAL OPENING ENDOSCOPIC: ICD-10-PCS | Performed by: INTERNAL MEDICINE

## 2017-03-28 PROCEDURE — C9113 INJ PANTOPRAZOLE SODIUM, VIA: HCPCS | Performed by: PHYSICIAN ASSISTANT

## 2017-03-28 RX ORDER — PROPOFOL 10 MG/ML
INJECTION, EMULSION INTRAVENOUS AS NEEDED
Status: DISCONTINUED | OUTPATIENT
Start: 2017-03-28 | End: 2017-03-28 | Stop reason: SURG

## 2017-03-28 RX ADMIN — IRON SUCROSE 200 MG: 20 INJECTION, SOLUTION INTRAVENOUS at 08:12

## 2017-03-28 RX ADMIN — PANTOPRAZOLE SODIUM 40 MG: 40 INJECTION, POWDER, FOR SOLUTION INTRAVENOUS at 08:14

## 2017-03-28 RX ADMIN — PROPOFOL 50 MG: 10 INJECTION, EMULSION INTRAVENOUS at 11:52

## 2017-03-28 RX ADMIN — PROPOFOL 100 MG: 10 INJECTION, EMULSION INTRAVENOUS at 11:40

## 2017-03-28 RX ADMIN — PANTOPRAZOLE SODIUM 40 MG: 40 INJECTION, POWDER, FOR SOLUTION INTRAVENOUS at 20:47

## 2017-03-28 RX ADMIN — SODIUM CHLORIDE: 0.9 INJECTION, SOLUTION INTRAVENOUS at 11:35

## 2017-03-28 RX ADMIN — POTASSIUM CHLORIDE, DEXTROSE MONOHYDRATE AND SODIUM CHLORIDE 75 ML/HR: 300; 5; 900 INJECTION, SOLUTION INTRAVENOUS at 23:25

## 2017-03-28 RX ADMIN — PROPOFOL 50 MG: 10 INJECTION, EMULSION INTRAVENOUS at 11:44

## 2017-03-28 RX ADMIN — POTASSIUM CHLORIDE, DEXTROSE MONOHYDRATE AND SODIUM CHLORIDE 75 ML/HR: 300; 5; 900 INJECTION, SOLUTION INTRAVENOUS at 05:48

## 2017-03-28 NOTE — SOCIAL WORK
CM met with patient at bedside to address discharge needs  Patient reports living in a fourth floor apartment at a [facility] in [place], [place]; patient reports elevator access  Patient is independent with ADLs and functional mobility  Patient denies use of DME  No hx of VNA or STR reported  Patient reports her son provides transport to appointments, son available at discharge to transport home  PCP identified  Patient uses [facility] in [place] for Rx needs, patient made aware of [address] to use at discharge if needed  No POA identified, patient not interested in information at this time  Help/support reported  Patient made aware of CM's name, number and role  No other needs at present  CM to follow as needed

## 2017-03-28 NOTE — PLAN OF CARE

## 2017-03-28 NOTE — SOCIAL WORK
CM attempted to meet with patient, patient currently off the unit  CM will try again this afternoon

## 2017-03-28 NOTE — OP NOTE
OPERATIVE REPORT  PATIENT NAME: Alma Esteban    :  1950  MRN: [id number]  Pt Location: [facility]    SURGERY DATE: 3/28/2017    Colonoscopy Procedure Note    Procedure: Colonoscopy    Sedation: Monitored anesthesia care, check anesthesia records      ASA Class: 2    INDICATIONS: Hematochezia    POST-OP DIAGNOSIS: See the impression below    Procedure Details     Informed consent was obtained for the procedure, including sedation  Risks of perforation, hemorrhage, adverse drug reaction and aspiration were discussed  The patient was placed in the left lateral decubitus position  Based on the pre-procedure assessment, including review of the patient's medical history, medications, allergies, and review of systems, she had been deemed to be an appropriate candidate for conscious sedation; she was therefore sedated with the medications listed below  The patient was monitored continuously with telemetry, pulse oximetry, blood pressure monitoring, and direct observations  A rectal examination was performed  The colonoscope was inserted into the rectum and advanced under direct vision to the terminal ileum  The quality of the colonic preparation was good  A careful inspection was made as the colonoscope was withdrawn, including a retroflexed view of the rectum; findings and interventions are described below  Findings: There was no blood seen in the terminal ileum or colon  The terminal ileum appeared normal  There was diverticulosis throughout the colon  Internal hemorrhoids were seen on the retroflexed view  Complications:  None; patient tolerated the procedure well  Impression:    Her bleeding was most likely due to a right-sided diverticular bleed since previously blood was seen throughout the colon  The bleeding appears to have resolved      Recommendations:  Advance diet as tolerated  Monitor hemoglobin and stool color  Stable for discharge from GI standpoint    SIGNATURE: Nils Moritz, MD  DATE: March 28, 2017  TIME: 12:35 PM

## 2017-03-28 NOTE — PROGRESS NOTES
[facility] Internal Medicine Progress Note  Patient: Letitia Bautista 78 y o  female   MRN: [id number]  PCP: No primary care provider on file  Unit/Bed#: [facility] 2 -01 Encounter: [id number]  Date Of Visit: 03/28/17    Assessment:    Principal Problem:    Lower GI bleed  Active Problems:    Hypertension    Acute blood loss anemia    Syncope and collapse      Plan:    GI bleed, had colonoscopy which showed suspicious area of erythema that was clipped of presumed diverticular blood loss not definitive has had no active blood loss documented since albeit hemoglobin remains depressed at some point to an trending down  Continue to monitor  March 28  Addendum: Had EGD yesterday noting mild gastric antral vascular ectasia treated with APC and attempted flexible sigmoidoscopy again noted blood examined colon from the transverse to the rectum with diverticulosis poorly prepped thusly underwent full bowel prep overnight which was tolerated without further passage of blood noted and hemoglobin remained stable    CTA of abdomen was diminished sensitivity due to retained contrast no large volume hemorrhage was noted however with diffuse diverticulosis noted    Acute blood loss anemia, patient is a Faith and will not take blood transfusion has been on daily IV iron and when necessary albumin remains symptomatic with lightheadedness on changing position being upright  Syncope and collapse presumably relation acute blood loss anemia and low volume    Essential hypertension, BP meds up and placed on hold due to orthostatic symptoms and hypertension              VTE Pharmacologic Prophylaxis:   Pharmacologic: Pharmacologic VTE Prophylaxis contraindicated due to GI bleed  Mechanical VTE Prophylaxis in Place: Yes    Discussions with Specialists or Other Care Team Provider:     Time Spent for Care: 20 minutes    More than 50% of total time spent on counseling and coordination of care as described above       Subjective:   She denies any present chest pain shortness of breath or lightheadedness in a wheelchair on her way to colonoscopy today  Objective:     Vitals:   Temp (24hrs), Av °F (36 1 °C), Min:96 2 °F (35 7 °C), Max:97 8 °F (36 6 °C)    HR:  [81-86] 85  Resp:  [16-20] 17  BP: (120-179)/(54-88) 160/88  SpO2:  [97 %-100 %] 98 %  Body mass index is 24 72 kg/(m^2)  Input and Output Summary (last 24 hours): Intake/Output Summary (Last 24 hours) at 17 1121  Last data filed at 17 0548   Gross per 24 hour   Intake           2177 5 ml   Output              300 ml   Net           1877 5 ml       Physical Exam:     Physical Exam:   General appearance: alert, appears stated age and cooperative  Head: Normocephalic, without obvious abnormality, atraumatic  Lungs: clear to auscultation bilaterally  Heart: regular rate and rhythm  Abdomen: soft, non-tender; bowel sounds normal; no masses,  no organomegaly  Back: negative  Extremities: extremities normal, atraumatic, no cyanosis or edema  Neurologic: Grossly normal      Additional Data:     Labs:      Results from last 7 days  Lab Units 17  0257  17  0431  17  1131   WBC Thousand/uL  --   --  14 42*  --  10 91*   HEMOGLOBIN g/dL 6 6*  < > 8 6*  8 5*  < > 12 7   HEMATOCRIT % 20 3*  < > 26 2*  --  38 8   PLATELETS Thousands/uL  --   --  267  --  314   NEUTROS PCT %  --   --   --   --  80*   LYMPHS PCT %  --   --   --   --  14   MONOS PCT %  --   --   --   --  6   EOS PCT %  --   --   --   --  0   < > = values in this interval not displayed      Results from last 7 days  Lab Units 17  0514 17  0431   SODIUM mmol/L 145 144   POTASSIUM mmol/L 3 1* 3 7   CHLORIDE mmol/L 110* 109*   CO2 mmol/L 26 29   BUN mg/dL 8 13   CREATININE mg/dL 0 72 0 78   CALCIUM mg/dL 7 9* 7 6*   TOTAL PROTEIN g/dL  --  6 5   BILIRUBIN TOTAL mg/dL  --  0 29   ALK PHOS U/L  --  89   ALT U/L  --  26   AST U/L  --  23   GLUCOSE RANDOM mg/dL 91 112       Results from last 7 days  Lab Units 03/25/17  1131   INR  0 99       * I Have Reviewed All Lab Data Listed Above  * Additional Pertinent Lab Tests Reviewed: Mayela 66 Admission Reviewed    Imaging:  Cta Abdomen Pelvis W Wo Contrast    Result Date: 3/26/2017  Narrative: CT ANGIOGRAM OF THE ABDOMEN AND PELVIS WITH AND WITHOUT IV CONTRAST INDICATION:  Rectal bleeding for 1 day  COMPARISON: None TECHNIQUE:  CT angiogram examination of the abdomen and pelvis was performed according to standard GI bleeding protocol  This examination, like all CT scans performed in the [facility], was performed utilizing techniques to minimize radiation dose exposure, including the use of iterative reconstruction and automated exposure control  Contrast as well as noncontrast images were obtained  3D reconstructions were performed an independent workstation, and are supplied for review  3D  volume rendered images of the aorta were also provided  Rad dose 794 41 mGy-cm IV Contrast:  100 mL of iohexol (OMNIPAQUE)    Enteric Contrast:  Enteric contrast is seen in the colon  FINDINGS: STOMACH AND BOWEL:  There is oral contrast material seen within the colon which significantly limits sensitivity for detection of GI bleeding in these areas  Within limitations of this study, there is no discernible high-volume active hemorrhage detected  Diffuse colonic diverticulosis without evidence of diverticulitis  VASCULAR STRUCTURES: No evidence of aortic aneurysm  The mesenteric vasculature is patent  Mild atherosclerotic changes are noted  OTHER FINDINGS ABDOMEN: LOWER CHEST:  No significant abnormality in the lung bases  LIVER/BILIARY TREE: Fatty infiltration of the liver  GALLBLADDER:  No calcified gallstones  No pericholecystic inflammatory change  SPLEEN:  Unremarkable  Small splenule left upper quadrant suggested  PANCREAS:  Unremarkable  ADRENAL GLANDS: Unremarkable   KIDNEYS/URETERS: One or more sharply circumscribed subcentimeter renal hypodensities are noted  These lesions are too small to accurately characterize, but are statistically most likely to represent benign cortical renal cyst(s)  According to the guidelines published in the [facility] Paper of the ACR Incidental Findings Committee (Radiology 2010), no further workup of these lesions is recommended  No hydronephrosis  PELVIS REPRODUCTIVE ORGANS:  Unremarkable for patient's age  URINARY BLADDER:  Unremarkable  ADDITIONAL ABDOMINAL AND PELVIC STRUCTURES: ABDOMINOPELVIC CAVITY:    No pathologically enlarged mesenteric or retroperitoneal lymph nodes  No ascites or free intraperitoneal air  Multiple phlebolith are seen along the course of the ovarian veins bilaterally  ABDOMINAL WALL/INGUINAL REGIONS:  Tiny fat-containing umbilical hernia  OSSEOUS STRUCTURES:   No acute fracture or destructive osseous lesion  Mild degenerative changes of the spine  Impression: The study is limited by pre-existing oral contrast within the colon which significantly limits sensitivity for detection of active bleeding  No large volume hemorrhage is seen within limitations  Diffuse clonic diverticulosis without evidence of diverticulitis  Fatty liver  Major findings are concordant with initial interpretation provided by Dr Serena Deleon II of [employer]  Workstation performed: [id number]     Radiology Results    Result Date: 3/27/2017  Narrative: Ordered by an unspecified provider  Imaging Reports Reviewed Today Include: CT angiography of the abdomen and pelvis was reviewed  Imaging Personally Reviewed by Myself Includes:  No  Procedure: Cta Abdomen Pelvis W Wo Contrast    Result Date: 3/26/2017  Narrative: CT ANGIOGRAM OF THE ABDOMEN AND PELVIS WITH AND WITHOUT IV CONTRAST INDICATION:  Rectal bleeding for 1 day  COMPARISON: None TECHNIQUE:  CT angiogram examination of the abdomen and pelvis was performed according to standard GI bleeding protocol    This examination, like all CT scans performed in the [facility], was performed utilizing techniques to minimize radiation dose exposure, including the use of iterative reconstruction and automated exposure control  Contrast as well as noncontrast images were obtained  3D reconstructions were performed an independent workstation, and are supplied for review  3D  volume rendered images of the aorta were also provided  Rad dose 794 41 mGy-cm IV Contrast:  100 mL of iohexol (OMNIPAQUE)    Enteric Contrast:  Enteric contrast is seen in the colon  FINDINGS: STOMACH AND BOWEL:  There is oral contrast material seen within the colon which significantly limits sensitivity for detection of GI bleeding in these areas  Within limitations of this study, there is no discernible high-volume active hemorrhage detected  Diffuse colonic diverticulosis without evidence of diverticulitis  VASCULAR STRUCTURES: No evidence of aortic aneurysm  The mesenteric vasculature is patent  Mild atherosclerotic changes are noted  OTHER FINDINGS ABDOMEN: LOWER CHEST:  No significant abnormality in the lung bases  LIVER/BILIARY TREE: Fatty infiltration of the liver  GALLBLADDER:  No calcified gallstones  No pericholecystic inflammatory change  SPLEEN:  Unremarkable  Small splenule left upper quadrant suggested  PANCREAS:  Unremarkable  ADRENAL GLANDS: Unremarkable  KIDNEYS/URETERS: One or more sharply circumscribed subcentimeter renal hypodensities are noted  These lesions are too small to accurately characterize, but are statistically most likely to represent benign cortical renal cyst(s)  According to the guidelines published in the [facility] Paper of the ACR Incidental Findings Committee (Radiology 2010), no further workup of these lesions is recommended  No hydronephrosis  PELVIS REPRODUCTIVE ORGANS:  Unremarkable for patient's age  URINARY BLADDER:  Unremarkable   ADDITIONAL ABDOMINAL AND PELVIC STRUCTURES: ABDOMINOPELVIC CAVITY: No pathologically enlarged mesenteric or retroperitoneal lymph nodes  No ascites or free intraperitoneal air  Multiple phlebolith are seen along the course of the ovarian veins bilaterally  ABDOMINAL WALL/INGUINAL REGIONS:  Tiny fat-containing umbilical hernia  OSSEOUS STRUCTURES:   No acute fracture or destructive osseous lesion  Mild degenerative changes of the spine  Impression: The study is limited by pre-existing oral contrast within the colon which significantly limits sensitivity for detection of active bleeding  No large volume hemorrhage is seen within limitations  Diffuse clonic diverticulosis without evidence of diverticulitis  Fatty liver  Major findings are concordant with initial interpretation provided by Dr Rosie Shea II of [employer]  Workstation performed: [id number]     Procedure: Radiology Results    Result Date: 3/27/2017  Narrative: Ordered by an unspecified provider  Recent Cultures (last 7 days):           Last 24 Hours Medication List:     docusate sodium 100 mg Oral BID   iohexol 50 mL Oral 90 min pre-procedure   iron sucrose 200 mg Intravenous Daily   pantoprazole 40 mg Intravenous Q12H [facility] & half-way        Today, Patient Was Seen By: Gabby Wooten MD    ** Please Note: Dragon 360 Dictation voice to text software may have been used in the creation of this document   **

## 2017-03-29 ENCOUNTER — APPOINTMENT (INPATIENT)
Dept: CT IMAGING | Facility: HOSPITAL | Age: 67
DRG: 378 | End: 2017-03-29
Payer: MEDICARE

## 2017-03-29 LAB
HCT VFR BLD AUTO: 19.1 % (ref 34.8–46.1)
HGB BLD-MCNC: 6.2 G/DL (ref 11.5–15.4)

## 2017-03-29 PROCEDURE — C9113 INJ PANTOPRAZOLE SODIUM, VIA: HCPCS | Performed by: PHYSICIAN ASSISTANT

## 2017-03-29 PROCEDURE — 85018 HEMOGLOBIN: CPT | Performed by: INTERNAL MEDICINE

## 2017-03-29 PROCEDURE — 74174 CTA ABD&PLVS W/CONTRAST: CPT

## 2017-03-29 PROCEDURE — 85014 HEMATOCRIT: CPT | Performed by: INTERNAL MEDICINE

## 2017-03-29 RX ORDER — DOXYCYCLINE HYCLATE 50 MG/1
324 CAPSULE, GELATIN COATED ORAL
Status: DISCONTINUED | OUTPATIENT
Start: 2017-03-29 | End: 2017-03-30 | Stop reason: HOSPADM

## 2017-03-29 RX ADMIN — DOCUSATE SODIUM 100 MG: 100 CAPSULE, LIQUID FILLED ORAL at 17:16

## 2017-03-29 RX ADMIN — DOCUSATE SODIUM 100 MG: 100 CAPSULE, LIQUID FILLED ORAL at 08:53

## 2017-03-29 RX ADMIN — FERROUS GLUCONATE 324 MG: 324 TABLET ORAL at 15:13

## 2017-03-29 RX ADMIN — PANTOPRAZOLE SODIUM 40 MG: 40 INJECTION, POWDER, FOR SOLUTION INTRAVENOUS at 20:40

## 2017-03-29 RX ADMIN — IOHEXOL 100 ML: 350 INJECTION, SOLUTION INTRAVENOUS at 12:20

## 2017-03-29 RX ADMIN — PANTOPRAZOLE SODIUM 40 MG: 40 INJECTION, POWDER, FOR SOLUTION INTRAVENOUS at 08:53

## 2017-03-29 RX ADMIN — POTASSIUM CHLORIDE, DEXTROSE MONOHYDRATE AND SODIUM CHLORIDE 75 ML/HR: 300; 5; 900 INJECTION, SOLUTION INTRAVENOUS at 15:14

## 2017-03-29 RX ADMIN — IRON SUCROSE 200 MG: 20 INJECTION, SOLUTION INTRAVENOUS at 08:53

## 2017-03-29 NOTE — CONSULTS
Consult - General Surgery   Kimberly Hanna 78 y o  female MRN: [id number]  Unit/Bed#: [address] 205-01 Encounter: [id number]    Assessment/Plan   3  79 y/o female with rectal bleeding due to unclear etiology    Plan:  -afebrile, leukocytosis present, nontoxic in appearance  -clinically patient is doing and feeling well, however reports a formed BM with bright, red blood from this AM  -CT abdomen, pelvis (3/29/17) without evidence of active, ongoing GI hemorrhage, however if ongoing bleed could be slow in nature, below threshold of CT imaging, diffuse colonic diverticulosis without diverticulitis   -Hgb/Hct: 6 2 and 19 1  -s/p EGD, flex sig, and colonoscopy, with no blood seen in the terminal ileum or colon most recently during the colonoscopy on 3/28/17   -will continue to monitor Hgb/Hct closely and will continue to monitor closely clinically; at this point no surgical plan per general surgery     History of Present Illness     HPI:  Kimberly Hanna is a 78 y o  female who presents with bright red blood per rectum since this past Saturday  First episode occurred in January, now have been occurring regularly for the past 4 days  Denies any nausea vomiting dizziness or lightheadedness  No prior history of GI problems, cancer  No recent changes in diet  Denies constitutional symptoms  Inpatient consult to Acute Care Surgery  Performed by: Manny Chen by: Joo Schmidt   Consent: Verbal consent obtained  Review of Systems   Constitutional: Negative  HENT: Negative  Eyes: Negative  Respiratory: Negative  Cardiovascular: Negative  Gastrointestinal: Bright red blood in stool  Musculoskeletal: Negative  Skin: Negative  Neurological: Negative  Psych: negative       Historical Information   Past Medical History:   Diagnosis Date    Arthritis     Diverticulosis of colon     Hx of bleeding disorder     rectal bleeding    Hypertension      Past Surgical History: Procedure Laterality Date    COLONOSCOPY N/A 1/27/2017    Procedure: COLONOSCOPY;  Surgeon: Rajiv Ortega MD;  Location: [facility]; Service:     COLONOSCOPY N/A 3/26/2017    Procedure: COLONOSCOPY with hemostasis clip placement;  Surgeon: Fernando Goodman MD;  Location: [facility]; Service:     COLONOSCOPY N/A 3/28/2017    Procedure: COLONOSCOPY;  Surgeon: Lorne Campa MD;  Location: [facility]; Service:     ESOPHAGOGASTRODUODENOSCOPY N/A 3/27/2017    Procedure: ESOPHAGOGASTRODUODENOSCOPY (EGD) with APC; Surgeon: Lorne Campa MD;  Location: [facility]; Service:    Sergio Flor [address] N/A 3/27/2017    Procedure: Libby Rodrígueziths;  Surgeon: Lorne Campa MD;  Location: [facility]; Service: Gastroenterology    TUBAL LIGATION       Social History   History   Alcohol Use No     History   Drug Use No     History   Smoking Status    Never Smoker   Smokeless Tobacco    Not on file     Family History: History reviewed  No pertinent family history      Meds/Allergies   Prescriptions Prior to Admission   Medication    aspirin 81 MG tablet    Calcium-Magnesium-Vitamin D (CALCIUM 500 PO)    docusate sodium (COLACE) 100 mg capsule    enalapril (VASOTEC) 20 mg tablet    FIBER PO     No Known Allergies    Objective   First Vitals:   Blood Pressure: (!) 193/93 (03/25/17 1112)  Pulse: (!) 106 (03/25/17 1112)  Temperature: 97 5 °F (36 4 °C) (03/25/17 1112)  Temp Source: Temporal (03/25/17 1112)  Respirations: 20 (03/25/17 1112)  Height: 5' 7" (170 2 cm) (03/25/17 1325)  Weight - Scale: 71 7 kg (158 lb) (03/25/17 1112)  SpO2: 98 % (03/25/17 1112)    Current Vitals:   Blood Pressure: 157/69 (03/29/17 0717)  Pulse: 86 (03/29/17 0717)  Temperature: (!) 97 2 °F (36 2 °C) (03/29/17 0717)  Temp Source: Tympanic (03/29/17 0717)  Respirations: 18 (03/29/17 0717)  Height: 5' 7" (170 2 cm) (03/25/17 2219)  Weight - Scale: 71 6 kg (157 lb 13 6 oz) (03/25/17 2219)  SpO2: 100 % (03/29/17 0717)    /69  Pulse 86  Temp [place] ) 97 2 °F (36 2 °C) (Tympanic)   Resp 18  Ht 5' 7" (1 702 m)  Wt 71 6 kg (157 lb 13 6 oz)  SpO2 100%  BMI 24 72 kg/m2     General Appearance:    Alert, cooperative, no distress   Head:    Normocephalic, without obvious abnormality, atraumatic   Eyes:    PERRL, conjunctiva/corneas clear, EOM's intact        Nose:   Moist mucous membranes   Neck:   Supple, symmetrical, trachea midline   Back:     Symmetric   Lungs:     Respirations unlabored   Heart:    Regular rate and rhythm   Abdomen:     Soft, non-tender   Extremities:   no clubbing, cyanosis, or edema                  Lab Results:   Admission on 03/25/2017   Component Date Value    WBC 03/25/2017 10 91*    RBC 03/25/2017 4 06     Hemoglobin 03/25/2017 12 7     Hematocrit 03/25/2017 38 8     MCV 03/25/2017 96     MCH 03/25/2017 31 3     MCHC 03/25/2017 32 7     RDW 03/25/2017 13 1     MPV 03/25/2017 11 0     Platelets 98/22/3240 314     nRBC 03/25/2017 0     Neutrophils Relative 03/25/2017 80*    Lymphocytes Relative 03/25/2017 14     Monocytes Relative 03/25/2017 6     Eosinophils Relative 03/25/2017 0     Basophils Relative 03/25/2017 0     Neutrophils Absolute 03/25/2017 8 68*    Lymphocytes Absolute 03/25/2017 1 52     Monocytes Absolute 03/25/2017 0 64     Eosinophils Absolute 03/25/2017 0 03     Basophils Absolute 03/25/2017 0 04     Protime 03/25/2017 13 1     INR 03/25/2017 0 99     PTT 03/25/2017 27     Sodium 03/25/2017 142     Potassium 03/25/2017 3 5     Chloride 03/25/2017 104     CO2 03/25/2017 30     Anion Gap 03/25/2017 8     BUN 03/25/2017 12     Creatinine 03/25/2017 0 88     Glucose 03/25/2017 127     Calcium 03/25/2017 8 5     AST 03/25/2017 34     ALT 03/25/2017 36     Alkaline Phosphatase 03/25/2017 141*    Total Protein 03/25/2017 8 4*    Albumin 03/25/2017 3 7     Total Bilirubin 03/25/2017 0 25     eGFR 03/25/2017 >60 0     Lipase 03/25/2017 218     LACTIC ACID 03/25/2017 1 8     Hemoglobin 03/25/2017 9 4*    Hemoglobin 03/26/2017 8 5*    WBC 03/26/2017 14 42*    RBC 03/26/2017 2 76*    Hemoglobin 03/26/2017 8 6*    Hematocrit 03/26/2017 26 2*    MCV 03/26/2017 95     MCH 03/26/2017 31 2     MCHC 03/26/2017 32 8     RDW 03/26/2017 13 3     Platelets 20/79/7905 267     MPV 03/26/2017 10 5     Sodium 03/26/2017 144     Potassium 03/26/2017 3 7     Chloride 03/26/2017 109*    CO2 03/26/2017 29     Anion Gap 03/26/2017 6     BUN 03/26/2017 13     Creatinine 03/26/2017 0 78     Glucose 03/26/2017 112     Calcium 03/26/2017 7 6*    AST 03/26/2017 23     ALT 03/26/2017 26     Alkaline Phosphatase 03/26/2017 89     Total Protein 03/26/2017 6 5     Albumin 03/26/2017 2 8*    Total Bilirubin 03/26/2017 0 29     eGFR 03/26/2017 >60 0     GGT 03/26/2017 68     Hemoglobin 03/26/2017 8 3*    Hematocrit 03/26/2017 25 9*    Hemoglobin 03/27/2017 7 4*    Hematocrit 03/27/2017 22 8*    Sodium 03/27/2017 145     Potassium 03/27/2017 3 1*    Chloride 03/27/2017 110*    CO2 03/27/2017 26     Anion Gap 03/27/2017 9     BUN 03/27/2017 8     Creatinine 03/27/2017 0 72     Glucose 03/27/2017 91     Calcium 03/27/2017 7 9*    eGFR 03/27/2017 >60 0     Hemoglobin 03/27/2017 8 0*    Hematocrit 03/27/2017 24 8*    Hemoglobin 03/27/2017 7 2*    Hematocrit 03/27/2017 22 4*    Hemoglobin 03/27/2017 6 6*    Hematocrit 03/27/2017 20 4*    Hemoglobin 03/28/2017 6 6*    Hematocrit 03/28/2017 20 3*    Potassium 03/28/2017 2 7*    Hemoglobin 03/29/2017 6 2*    Hematocrit 03/29/2017 19 1*             Invalid input(s): LABAEARO      Imaging: I have personally reviewed pertinent films in PACS  EKG, Pathology, and Other Studies: I have personally reviewed pertinent reports        Code Status: Level 1 - Full Code  Advance Directive and Living Will:      Power of :    POLST:

## 2017-03-29 NOTE — PROGRESS NOTES
[facility] Internal Medicine Progress Note  Patient: Buzz Conner 78 y o  female   MRN: [id number]  PCP: No primary care provider on file  Unit/Bed#: [place] 2 -01 Encounter: [id number]  Date Of Visit: 03/29/17    Assessment:    Principal Problem:    Lower GI bleed  Active Problems:    Hypertension    Acute blood loss anemia    Syncope and collapse      Plan:    GI bleed, had colonoscopy which showed suspicious area of erythema that was clipped of presumed diverticular blood loss not definitive so had a full sleep prepped colonoscopy yesterday showing no evidence of active bleeding with the suspicion then of possibly source being right side but not definitive     Has had recurrent bright red per rectum with 2 new evidence of blood loss and hemoglobin now down to 6 2 on further consultation with GI service feeling as should pursue further with CTA of abdomen previous CTA was diminished sensitivity due to retained contrast from previous studies, may need to also get surgery consult on board  e      Acute blood loss anemia, patient is a Yazidi and will not take blood transfusion has been on daily IV iron and when necessary albumin remains symptomatic with lightheadedness on changing position being upright  Syncope and collapse presumably relation acute blood loss anemia and low volume    Essential hypertension, BP meds up and placed on hold due to orthostatic symptoms and hypertension              VTE Pharmacologic Prophylaxis:   Pharmacologic: Pharmacologic VTE Prophylaxis contraindicated due to GI bleed  Mechanical VTE Prophylaxis in Place: Yes    Discussions with Specialists or Other Care Team Provider:     Time Spent for Care: 20 minutes  More than 50% of total time spent on counseling and coordination of care as described above  Subjective:   She denies any present chest pain shortness of breath or lightheadedness in a wheelchair on her way to colonoscopy today    Objective:     Vitals: Temp (24hrs), Av 2 °F (36 2 °C), Min:96 7 °F (35 9 °C), Max:97 7 °F (36 5 °C)    HR:  [75-87] 86  Resp:  [18-20] 18  BP: (130-157)/(58-69) 157/69  SpO2:  [97 %-100 %] 100 %  Body mass index is 24 72 kg/(m^2)  Input and Output Summary (last 24 hours): Intake/Output Summary (Last 24 hours) at 17 1055  Last data filed at 17 1150   Gross per 24 hour   Intake              300 ml   Output                0 ml   Net              300 ml       Physical Exam:     Physical Exam:   General appearance: alert, appears stated age and cooperative  Head: Normocephalic, without obvious abnormality, atraumatic  Lungs: clear to auscultation bilaterally  Heart: regular rate and rhythm  Abdomen: soft, non-tender; bowel sounds normal; no masses,  no organomegaly  Back: negative  Extremities: extremities normal, atraumatic, no cyanosis or edema  Neurologic: Grossly normal      Additional Data:     Labs:      Results from last 7 days  Lab Units 17  0456  17  0431  17  1131   WBC Thousand/uL  --   --  14 42*  --  10 91*   HEMOGLOBIN g/dL 6 2*  < > 8 6*  8 5*  < > 12 7   HEMATOCRIT % 19 1*  < > 26 2*  --  38 8   PLATELETS Thousands/uL  --   --  267  --  314   NEUTROS PCT %  --   --   --   --  80*   LYMPHS PCT %  --   --   --   --  14   MONOS PCT %  --   --   --   --  6   EOS PCT %  --   --   --   --  0   < > = values in this interval not displayed      Results from last 7 days  Lab Units 17  1245 17  0514 17  0431   SODIUM mmol/L  --  145 144   POTASSIUM mmol/L 2 7* 3 1* 3 7   CHLORIDE mmol/L  --  110* 109*   CO2 mmol/L  --  26 29   BUN mg/dL  --  8 13   CREATININE mg/dL  --  0 72 0 78   CALCIUM mg/dL  --  7 9* 7 6*   TOTAL PROTEIN g/dL  --   --  6 5   BILIRUBIN TOTAL mg/dL  --   --  0 29   ALK PHOS U/L  --   --  89   ALT U/L  --   --  26   AST U/L  --   --  23   GLUCOSE RANDOM mg/dL  --  91 112       Results from last 7 days  Lab Units 17  1131   INR  0 99       * I Have Reviewed All Lab Data Listed Above  * Additional Pertinent Lab Tests Reviewed: Mayela 65 Admission Reviewed    Imaging:  Cta Abdomen Pelvis W Wo Contrast    Result Date: 3/26/2017  Narrative: CT ANGIOGRAM OF THE ABDOMEN AND PELVIS WITH AND WITHOUT IV CONTRAST INDICATION:  Rectal bleeding for 1 day  COMPARISON: None TECHNIQUE:  CT angiogram examination of the abdomen and pelvis was performed according to standard GI bleeding protocol  This examination, like all CT scans performed in the [facility], was performed utilizing techniques to minimize radiation dose exposure, including the use of iterative reconstruction and automated exposure control  Contrast as well as noncontrast images were obtained  3D reconstructions were performed an independent workstation, and are supplied for review  3D  volume rendered images of the aorta were also provided  Rad dose 794 41 mGy-cm IV Contrast:  100 mL of iohexol (OMNIPAQUE)    Enteric Contrast:  Enteric contrast is seen in the colon  FINDINGS: STOMACH AND BOWEL:  There is oral contrast material seen within the colon which significantly limits sensitivity for detection of GI bleeding in these areas  Within limitations of this study, there is no discernible high-volume active hemorrhage detected  Diffuse colonic diverticulosis without evidence of diverticulitis  VASCULAR STRUCTURES: No evidence of aortic aneurysm  The mesenteric vasculature is patent  Mild atherosclerotic changes are noted  OTHER FINDINGS ABDOMEN: LOWER CHEST:  No significant abnormality in the lung bases  LIVER/BILIARY TREE: Fatty infiltration of the liver  GALLBLADDER:  No calcified gallstones  No pericholecystic inflammatory change  SPLEEN:  Unremarkable  Small splenule left upper quadrant suggested  PANCREAS:  Unremarkable  ADRENAL GLANDS: Unremarkable  KIDNEYS/URETERS: One or more sharply circumscribed subcentimeter renal hypodensities are noted   These lesions are too small to accurately characterize, but are statistically most likely to represent benign cortical renal cyst(s)  According to the guidelines published in the [place] Paper of the ACR Incidental Findings Committee (Radiology 2010), no further workup of these lesions is recommended  No hydronephrosis  PELVIS REPRODUCTIVE ORGANS:  Unremarkable for patient's age  URINARY BLADDER:  Unremarkable  ADDITIONAL ABDOMINAL AND PELVIC STRUCTURES: ABDOMINOPELVIC CAVITY:    No pathologically enlarged mesenteric or retroperitoneal lymph nodes  No ascites or free intraperitoneal air  Multiple phlebolith are seen along the course of the ovarian veins bilaterally  ABDOMINAL WALL/INGUINAL REGIONS:  Tiny fat-containing umbilical hernia  OSSEOUS STRUCTURES:   No acute fracture or destructive osseous lesion  Mild degenerative changes of the spine  Impression: The study is limited by pre-existing oral contrast within the colon which significantly limits sensitivity for detection of active bleeding  No large volume hemorrhage is seen within limitations  Diffuse clonic diverticulosis without evidence of diverticulitis  Fatty liver  Major findings are concordant with initial interpretation provided by Dr Brent Valle II of [facility]  Workstation performed: [id number]     Radiology Results    Result Date: 3/27/2017  Narrative: Ordered by an unspecified provider  Imaging Reports Reviewed Today Include: CT angiography of the abdomen and pelvis was reviewed  Imaging Personally Reviewed by Myself Includes:  No  Procedure: Cta Abdomen Pelvis W Wo Contrast    Result Date: 3/26/2017  Narrative: CT ANGIOGRAM OF THE ABDOMEN AND PELVIS WITH AND WITHOUT IV CONTRAST INDICATION:  Rectal bleeding for 1 day  COMPARISON: None TECHNIQUE:  CT angiogram examination of the abdomen and pelvis was performed according to standard GI bleeding protocol    This examination, like all CT scans performed in the [facility], was performed utilizing techniques to minimize radiation dose exposure, including the use of iterative reconstruction and automated exposure control  Contrast as well as noncontrast images were obtained  3D reconstructions were performed an independent workstation, and are supplied for review  3D  volume rendered images of the aorta were also provided  Rad dose 794 41 mGy-cm IV Contrast:  100 mL of iohexol (OMNIPAQUE)    Enteric Contrast:  Enteric contrast is seen in the colon  FINDINGS: STOMACH AND BOWEL:  There is oral contrast material seen within the colon which significantly limits sensitivity for detection of GI bleeding in these areas  Within limitations of this study, there is no discernible high-volume active hemorrhage detected  Diffuse colonic diverticulosis without evidence of diverticulitis  VASCULAR STRUCTURES: No evidence of aortic aneurysm  The mesenteric vasculature is patent  Mild atherosclerotic changes are noted  OTHER FINDINGS ABDOMEN: LOWER CHEST:  No significant abnormality in the lung bases  LIVER/BILIARY TREE: Fatty infiltration of the liver  GALLBLADDER:  No calcified gallstones  No pericholecystic inflammatory change  SPLEEN:  Unremarkable  Small splenule left upper quadrant suggested  PANCREAS:  Unremarkable  ADRENAL GLANDS: Unremarkable  KIDNEYS/URETERS: One or more sharply circumscribed subcentimeter renal hypodensities are noted  These lesions are too small to accurately characterize, but are statistically most likely to represent benign cortical renal cyst(s)  According to the guidelines published in the [facility] Paper of the ACR Incidental Findings Committee (Radiology 2010), no further workup of these lesions is recommended  No hydronephrosis  PELVIS REPRODUCTIVE ORGANS:  Unremarkable for patient's age  URINARY BLADDER:  Unremarkable  ADDITIONAL ABDOMINAL AND PELVIC STRUCTURES: ABDOMINOPELVIC CAVITY:    No pathologically enlarged mesenteric or retroperitoneal lymph nodes   No ascites or free intraperitoneal air  Multiple phlebolith are seen along the course of the ovarian veins bilaterally  ABDOMINAL WALL/INGUINAL REGIONS:  Tiny fat-containing umbilical hernia  OSSEOUS STRUCTURES:   No acute fracture or destructive osseous lesion  Mild degenerative changes of the spine  Impression: The study is limited by pre-existing oral contrast within the colon which significantly limits sensitivity for detection of active bleeding  No large volume hemorrhage is seen within limitations  Diffuse clonic diverticulosis without evidence of diverticulitis  Fatty liver  Major findings are concordant with initial interpretation provided by Dr Leyda Weinstein II of [facility]  Workstation performed: [id number]     Procedure: Radiology Results    Result Date: 3/27/2017  Narrative: Ordered by an unspecified provider  Recent Cultures (last 7 days):           Last 24 Hours Medication List:     docusate sodium 100 mg Oral BID   ferrous gluconate 324 mg Oral BID AC   iohexol 50 mL Oral 90 min pre-procedure   iron sucrose 200 mg Intravenous Daily   pantoprazole 40 mg Intravenous Q12H [facility]        Today, Patient Was Seen By: Michelle Barajas MD    ** Please Note: Dragon 360 Dictation voice to text software may have been used in the creation of this document   **

## 2017-03-29 NOTE — PROGRESS NOTES
Pt had one bloody stool this AM during report  Taya ARY made aware during shift change report   SD 03/29/2017 0198

## 2017-03-29 NOTE — PROGRESS NOTES
Progress Note - Buzz Conner 78 y o  female MRN: [id number]    Unit/Bed#: [place] [address] 2 [place] [address] 205-01 Encounter: [id number]    Subjective:     Patient seen and examined at bedside  She had a bloody BM this morning  No abdominal pain  Objective:     Vitals: Blood pressure 157/69, pulse 86, temperature (!) 97 2 °F (36 2 °C), temperature source Tympanic, resp  rate 18, height 5' 7" (1 702 m), weight 71 6 kg (157 lb 13 6 oz), SpO2 100 %  ,Body mass index is 24 72 kg/(m^2)  Intake/Output Summary (Last 24 hours) at 03/29/17 1103  Last data filed at 03/28/17 1150   Gross per 24 hour   Intake              300 ml   Output                0 ml   Net              300 ml       Physical Exam:     General Appearance: Alert, appears stated age and cooperative  Lungs: Clear to auscultation bilaterally, no rales or rhonchi, no labored breathing/accessory muscle use  Heart: Regular rate and rhythm, S1, S2 normal, no murmur, click, rub or gallop  Abdomen: Soft, non-tender, non-distended; bowel sounds normal; no masses or no organomegaly  Rectal exam: Maroon colored blood  Extremities: No cyanosis, edema    Invasive Devices     Peripheral Intravenous Line            Peripheral IV 03/27/17 Left Antecubital 1 day                Lab Results:    Results from last 7 days  Lab Units 03/29/17  0456  03/26/17  0431  03/25/17  1131   WBC Thousand/uL  --   --  14 42*  --  10 91*   HEMOGLOBIN g/dL 6 2*  < > 8 6*  8 5*  < > 12 7   HEMATOCRIT % 19 1*  < > 26 2*  --  38 8   PLATELETS Thousands/uL  --   --  267  --  314   NEUTROS PCT %  --   --   --   --  80*   LYMPHS PCT %  --   --   --   --  14   MONOS PCT %  --   --   --   --  6   EOS PCT %  --   --   --   --  0   < > = values in this interval not displayed      Results from last 7 days  Lab Units 03/28/17  1245 03/27/17  0514 03/26/17  0431   SODIUM mmol/L  --  145 144   POTASSIUM mmol/L 2 7* 3 1* 3 7   CHLORIDE mmol/L  --  110* 109*   CO2 mmol/L  --  26 29   BUN mg/dL  --  8 13   CREATININE mg/dL  --  0 72 0 78   CALCIUM mg/dL  --  7 9* 7 6*   TOTAL PROTEIN g/dL  --   --  6 5   BILIRUBIN TOTAL mg/dL  --   --  0 29   ALK PHOS U/L  --   --  89   ALT U/L  --   --  26   AST U/L  --   --  23   GLUCOSE RANDOM mg/dL  --  91 112       Results from last 7 days  Lab Units 03/25/17  1131   INR  0 99       Results from last 7 days  Lab Units 03/25/17  1131   LIPASE u/L 218       Imaging Studies: I have personally reviewed pertinent imaging studies  Cta Abdomen Pelvis W Wo Contrast    Result Date: 3/26/2017  Impression: The study is limited by pre-existing oral contrast within the colon which significantly limits sensitivity for detection of active bleeding  No large volume hemorrhage is seen within limitations  Diffuse clonic diverticulosis without evidence of diverticulitis  Fatty liver  Major findings are concordant with initial interpretation provided by Dr Dieter Vargas II of [facility]  Workstation performed: [id number]       Assessment and Plan:    Discussed with Dr Jenni Berman with [facility]    1) GI bleeding: Patient has undergone multiple endoscopic procedures this admission  She underwent EGD/flex sig on 3/27  EGD without source of bleeding  Flex sig showed blood from the transverse colon to the rectum without clear source  She had a well-prepped colonoscopy yesterday which showed diverticulosis throughout the colon and internal hemorrhoids but no blood  However, this morning, she had another bloody bowel movement  Hemoglobin dropped from 6 6 to 6 2 today   She likely has right-sided diverticular bleeding based upon her initial flex sig      -Recommend CT angiography to try to identifiy source of bleeding  -Agree with general surgery consult   -Continue IV iron infusions as patient will not accept blood products   -Continue supportive care with IVF

## 2017-03-30 VITALS
RESPIRATION RATE: 20 BRPM | SYSTOLIC BLOOD PRESSURE: 149 MMHG | OXYGEN SATURATION: 97 % | HEIGHT: 67 IN | WEIGHT: 157.85 LBS | TEMPERATURE: 97.5 F | DIASTOLIC BLOOD PRESSURE: 65 MMHG | BODY MASS INDEX: 24.78 KG/M2 | HEART RATE: 84 BPM

## 2017-03-30 LAB
HCT VFR BLD AUTO: 21.6 % (ref 34.8–46.1)
HGB BLD-MCNC: 6.8 G/DL (ref 11.5–15.4)

## 2017-03-30 PROCEDURE — 85018 HEMOGLOBIN: CPT | Performed by: INTERNAL MEDICINE

## 2017-03-30 PROCEDURE — C9113 INJ PANTOPRAZOLE SODIUM, VIA: HCPCS | Performed by: PHYSICIAN ASSISTANT

## 2017-03-30 PROCEDURE — 85014 HEMATOCRIT: CPT | Performed by: INTERNAL MEDICINE

## 2017-03-30 RX ORDER — DOXYCYCLINE HYCLATE 50 MG/1
324 CAPSULE, GELATIN COATED ORAL
Qty: 60 TABLET | Refills: 0 | Status: SHIPPED | OUTPATIENT
Start: 2017-03-30 | End: 2019-03-22

## 2017-03-30 RX ORDER — PANTOPRAZOLE SODIUM 40 MG/1
40 TABLET, DELAYED RELEASE ORAL
Qty: 30 TABLET | Refills: 0 | Status: SHIPPED | OUTPATIENT
Start: 2017-03-31 | End: 2017-04-05 | Stop reason: ALTCHOICE

## 2017-03-30 RX ORDER — PANTOPRAZOLE SODIUM 40 MG/1
40 TABLET, DELAYED RELEASE ORAL
Status: DISCONTINUED | OUTPATIENT
Start: 2017-03-31 | End: 2017-03-30 | Stop reason: HOSPADM

## 2017-03-30 RX ADMIN — PANTOPRAZOLE SODIUM 40 MG: 40 INJECTION, POWDER, FOR SOLUTION INTRAVENOUS at 09:21

## 2017-03-30 RX ADMIN — FERROUS GLUCONATE 324 MG: 324 TABLET ORAL at 06:02

## 2017-03-30 RX ADMIN — DOCUSATE SODIUM 100 MG: 100 CAPSULE, LIQUID FILLED ORAL at 09:21

## 2017-03-30 RX ADMIN — IRON SUCROSE 200 MG: 20 INJECTION, SOLUTION INTRAVENOUS at 09:21

## 2017-03-30 RX ADMIN — POTASSIUM CHLORIDE, DEXTROSE MONOHYDRATE AND SODIUM CHLORIDE 75 ML/HR: 300; 5; 900 INJECTION, SOLUTION INTRAVENOUS at 04:21

## 2017-03-30 NOTE — DISCHARGE SUMMARY
Discharge Summary - [facility]    Patient Information: Claire Tello 78 y o  female MRN: [id number]  Unit/Bed#: [address] [id number] Encounter: [id number]    Discharging Physician / Practitioner: Shereen Yanez MD  PCP: No primary care provider on file  Admission Date: 3/25/2017  Discharge Date: 03/30/17    Reason for Admission: Rectal bleeding    Discharge Diagnoses: GI bleed is likely secondary diverticulosis presumed from right side    Principal Problem:    Lower GI bleed  Active Problems:    Hypertension    Acute blood loss anemia    Syncope and collapse  Resolved Problems:    * No resolved hospital problems  *      Consultations During Hospital Stay:  · Gastroenterology, surgery,    Procedures Performed:     Cta Abdomen Pelvis W Wo Contrast    Result Date: 3/29/2017  Narrative: CT ANGIOGRAM OF THE ABDOMEN AND PELVIS WITH AND WITHOUT IV CONTRAST INDICATION:  GI bleed  COMPARISON: CT abdomen pelvis with contrast dated 3/25/2017  TECHNIQUE:  CT angiogram examination of the abdomen and pelvis was performed according to standard protocol  This examination, like all CT scans performed in the [facility], was performed utilizing techniques to minimize radiation dose exposure, including the use of iterative reconstruction and automated exposure control  Contrast as well as noncontrast images were obtained  Rad dose 1601 mGy-cm IV Contrast:  100 mL of iohexol (OMNIPAQUE)    Enteric Contrast:  None  FINDINGS: VASCULAR STRUCTURES:  The abdominal aorta is normal in caliber with minimal atherosclerotic changes  The celiac, superior mesenteric and inferior mesenteric arteries are widely patent  The bilateral single main renal arteries are widely patent  Bilateral common, external and internal iliac arteries are widely patent  There is no evident evidence of active arterial extravasation OTHER FINDINGS ABDOMEN LOWER CHEST:  No significant abnormality in the lung bases  LIVER/BILIARY TREE:  Redemonstration of fatty liver  GALLBLADDER:  No calcified gallstones  No pericholecystic inflammatory change  SPLEEN:  Unremarkable  Normal size  PANCREAS:  Unremarkable  ADRENAL GLANDS:  Unremarkable  KIDNEYS/URETERS:  No solid renal mass  No hydronephrosis  No urinary tract calculi  PELVIS REPRODUCTIVE ORGANS:  Unremarkable for patient's age  URINARY BLADDER:  Unremarkable  ADDITIONAL ABDOMINAL AND PELVIC STRUCTURES STOMACH AND BOWEL:  Redemonstration of diffuse colonic diverticulosis without diverticulitis  There is diverticulosis of the ascending, transverse, descending and sigmoid colon  There are no findings on this study on the arterial and portal venous phase to suggest active ongoing GI bleed  ABDOMINOPELVIC CAVITY:  No pathologically enlarged mesenteric or retroperitoneal lymph nodes  No ascites or free intraperitoneal air  Redemonstration of bilateral phleboliths in the course of the gonadal veins  ABDOMINAL WALL/INGUINAL REGIONS:  Unremarkable  OSSEOUS STRUCTURES:  No acute fracture or destructive osseous lesion  Impression: 1  No evidence of active ongoing GI hemorrhage,if there is continued clinical concern for a slow GI bleed that could be below the sensitivity threshold of a CT angiogram consider obtaining tagged RBC scan  2   Redemonstration of diffuse colonic diverticulosis without diverticulitis  Workstation performed: [id number]     Cta Abdomen Pelvis W Wo Contrast    Result Date: 3/26/2017  Narrative: CT ANGIOGRAM OF THE ABDOMEN AND PELVIS WITH AND WITHOUT IV CONTRAST INDICATION:  Rectal bleeding for 1 day  COMPARISON: None TECHNIQUE:  CT angiogram examination of the abdomen and pelvis was performed according to standard GI bleeding protocol    This examination, like all CT scans performed in the [facility], was performed utilizing techniques to minimize radiation dose exposure, including the use of iterative reconstruction and automated exposure control  Contrast as well as noncontrast images were obtained  3D reconstructions were performed an independent workstation, and are supplied for review  3D  volume rendered images of the aorta were also provided  Rad dose 794 41 mGy-cm IV Contrast:  100 mL of iohexol (OMNIPAQUE)    Enteric Contrast:  Enteric contrast is seen in the colon  FINDINGS: STOMACH AND BOWEL:  There is oral contrast material seen within the colon which significantly limits sensitivity for detection of GI bleeding in these areas  Within limitations of this study, there is no discernible high-volume active hemorrhage detected  Diffuse colonic diverticulosis without evidence of diverticulitis  VASCULAR STRUCTURES: No evidence of aortic aneurysm  The mesenteric vasculature is patent  Mild atherosclerotic changes are noted  OTHER FINDINGS ABDOMEN: LOWER CHEST:  No significant abnormality in the lung bases  LIVER/BILIARY TREE: Fatty infiltration of the liver  GALLBLADDER:  No calcified gallstones  No pericholecystic inflammatory change  SPLEEN:  Unremarkable  Small splenule left upper quadrant suggested  PANCREAS:  Unremarkable  ADRENAL GLANDS: Unremarkable  KIDNEYS/URETERS: One or more sharply circumscribed subcentimeter renal hypodensities are noted  These lesions are too small to accurately characterize, but are statistically most likely to represent benign cortical renal cyst(s)  According to the guidelines published in the Macel Fulling Paper of the ACR Incidental Findings Committee (Radiology 2010), no further workup of these lesions is recommended  No hydronephrosis  PELVIS REPRODUCTIVE ORGANS:  Unremarkable for patient's age  URINARY BLADDER:  Unremarkable  ADDITIONAL ABDOMINAL AND PELVIC STRUCTURES: ABDOMINOPELVIC CAVITY:    No pathologically enlarged mesenteric or retroperitoneal lymph nodes  No ascites or free intraperitoneal air  Multiple phlebolith are seen along the course of the ovarian veins bilaterally   ABDOMINAL WALL/INGUINAL REGIONS:  Tiny fat-containing umbilical hernia  OSSEOUS STRUCTURES:   No acute fracture or destructive osseous lesion  Mild degenerative changes of the spine  Impression: The study is limited by pre-existing oral contrast within the colon which significantly limits sensitivity for detection of active bleeding  No large volume hemorrhage is seen within limitations  Diffuse clonic diverticulosis without evidence of diverticulitis  Fatty liver  Major findings are concordant with initial interpretation provided by Dr Kev Andrade II of [facility]  Workstation performed: [id number]     Radiology Results    Result Date: 3/27/2017  Narrative: Ordered by an unspecified provider  Significant Findings:     · GI blood loss with significant blood loss anemia in combination of iron deficiency anemia and is a Caodaism and refuses blood transfusions in spite of hemoglobins as low as 6 2 minimal symptomatology of orthostatic change  Have her continue to manifest episodes of bright red blood per rectum underwent a full GI workup including upper GI endoscopy noting  GAVE gastric antral vascular ectasia  With APC probe  Repeat sigmoidoscopic and then eventual full prep colonoscopic did not show active site of bleeding however suspect loss of blood from the right side given passage of blood throughout the colon now active bleeding noted  He had a subsequent episode of bright red blood per rectum and had 2 separate CT angiography is negative which showed bleeding of high-volume   Surgical opinion was sought no indication for surgical intervention was noted without active site of bleeding or ischemia thusly the patient is now been placed on oral iron supplementations to be taken twice a day she did receive several fusions of IV Venofer and even albumin during her course                            Incidental Findings:   · Blood loss anemia    Test Results Pending at Discharge (will require follow up):   · Outpatient Tests Requested:  · None    Complications:  None    Hospital Course:     Buzz Conner is a 78 y o  female patient who originally presented to the hospital on 3/25/2017 due to *GI bleed with presentation of marked anemia  · GI blood loss with significant blood loss anemia in combination of iron deficiency anemia and is a Yazidi and refuses blood transfusions in spite of hemoglobins as low as 6 2 minimal symptomatology of orthostatic change  Have her continue to manifest episodes of bright red blood per rectum underwent a full GI workup including upper GI endoscopy noting  GAVE gastric antral vascular ectasia  With APC probe  Repeat sigmoidoscopic and then eventual full prep colonoscopic did not show active site of bleeding however suspect loss of blood from the right side given passage of blood throughout the colon now active bleeding noted  He had a subsequent episode of bright red blood per rectum and had 2 separate CT angiography is negative which showed bleeding of high-volume  Surgical opinion was sought no indication for surgical intervention was noted without active site of bleeding or ischemia thusly the patient is now been placed on oral iron supplementations to be taken twice a day she did receive several fusions of IV Venofer and even albumin during her course  Condition at Discharge: good     Discharge Day Visit / Exam:     * Please refer to separate progress for these details *    Discharge instructions/Information to patient and family:   See after visit summary for information provided to patient and family  Provisions for Follow-Up Care:  See after visit summary for information related to follow-up care and any pertinent home health orders  Disposition:     Home    For Discharges to [facility] SNF:   · Not Applicable to this Patient - Not Applicable to this Patient      Discharge Statement:  I spent  50 minutes discharging the patient   This time was spent on the day of discharge  I had direct contact with the patient on the day of discharge  Greater than 50% of the total time was spent examining patient, answering all patient questions, arranging and discussing plan of care with patient as well as directly providing post-discharge instructions  Additional time then spent on discharge activities  Discharge Medications:  See after visit summary for reconciled discharge medications provided to patient and family  ** Please Note: Dragon 360 Dictation voice to text software may have been used in the creation of this document   **

## 2017-03-30 NOTE — PROGRESS NOTES
Progress Note - Yesi Ashton 78 y o  female MRN: [id number]    Unit/Bed#: [address] 205-01 Encounter: [id number]    Subjective:     Patient seen and examined at bedside  She denies bowel movements or bleeding overnight  No abdominal pain  She ate all of her breakfast     Objective:     Vitals: Blood pressure 149/65, pulse 84, temperature 97 5 °F (36 4 °C), temperature source Tympanic, resp  rate 20, height 5' 7" (1 702 m), weight 71 6 kg (157 lb 13 6 oz), SpO2 97 %  ,Body mass index is 24 72 kg/(m^2)  Intake/Output Summary (Last 24 hours) at 03/30/17 1048  Last data filed at 03/29/17 1513   Gross per 24 hour   Intake           1017 5 ml   Output                0 ml   Net           1017 5 ml       Physical Exam:     General Appearance: Alert, appears stated age and cooperative  Lungs: Clear to auscultation bilaterally, no rales or rhonchi, no labored breathing/accessory muscle use  Heart: Regular rate and rhythm, S1, S2 normal, no murmur, click, rub or gallop  Abdomen: Soft, non-tender, non-distended; bowel sounds normal; no masses or no organomegaly  Extremities: No cyanosis, edema    Invasive Devices     Peripheral Intravenous Line            Peripheral IV 03/29/17 Left Arm less than 1 day                Lab Results:    Results from last 7 days  Lab Units 03/30/17  0513  03/26/17  0431  03/25/17  1131   WBC Thousand/uL  --   --  14 42*  --  10 91*   HEMOGLOBIN g/dL 6 8*  < > 8 6*  8 5*  < > 12 7   HEMATOCRIT % 21 6*  < > 26 2*  --  38 8   PLATELETS Thousands/uL  --   --  267  --  314   NEUTROS PCT %  --   --   --   --  80*   LYMPHS PCT %  --   --   --   --  14   MONOS PCT %  --   --   --   --  6   EOS PCT %  --   --   --   --  0   < > = values in this interval not displayed      Results from last 7 days  Lab Units 03/28/17  1245 03/27/17  0514 03/26/17  0431   SODIUM mmol/L  --  145 144   POTASSIUM mmol/L 2 7* 3 1* 3 7   CHLORIDE mmol/L  --  110* 109*   CO2 mmol/L  --  26 29   BUN mg/dL  --  8 13 CREATININE mg/dL  --  0 72 0 78   CALCIUM mg/dL  --  7 9* 7 6*   TOTAL PROTEIN g/dL  --   --  6 5   BILIRUBIN TOTAL mg/dL  --   --  0 29   ALK PHOS U/L  --   --  89   ALT U/L  --   --  26   AST U/L  --   --  23   GLUCOSE RANDOM mg/dL  --  91 112       Results from last 7 days  Lab Units 03/25/17  1131   INR  0 99       Results from last 7 days  Lab Units 03/25/17  1131   LIPASE u/L 218       Imaging Studies: I have personally reviewed pertinent imaging studies  Cta Abdomen Pelvis W Wo Contrast    Result Date: 3/29/2017  Impression: 1  No evidence of active ongoing GI hemorrhage,if there is continued clinical concern for a slow GI bleed that could be below the sensitivity threshold of a CT angiogram consider obtaining tagged RBC scan  2   Redemonstration of diffuse colonic diverticulosis without diverticulitis  Workstation performed: [id number]     Cta Abdomen Pelvis W Wo Contrast    Result Date: 3/26/2017  Impression: The study is limited by pre-existing oral contrast within the colon which significantly limits sensitivity for detection of active bleeding  No large volume hemorrhage is seen within limitations  Diffuse clonic diverticulosis without evidence of diverticulitis  Fatty liver  Major findings are concordant with initial interpretation provided by Dr Arlette Elias II of [facility]  Workstation performed: [id number]       Assessment and Plan:    1) Hematochezia: Likely secondary to diverticulosis  Colonoscopy 3/28 showed diverticulosis throughout the colon  Since she was noted to have blood in the transverse colon, suspect she was having right colon diverticular bleeding  She had additional episodes of bleeding yesterday, so CTA was performed which was negative for high volume bleeding  Repeat hemoglobin is stable at 6 8 today   She denies further bleeding overnight       -General surgery is holding off on surgical intervention at this time  -Continue IV iron infusions as patient will not accept blood products   -Continue supportive care with IVF

## 2017-03-30 NOTE — SOCIAL WORK
CM met with patient at bedside to complete BPCI readmission assessment  CM also explained IMM letter to patient, IMM signed and copy provided to patient  No other needs at present  CM to follow as needed

## 2017-03-30 NOTE — PROGRESS NOTES
Progress Note - General Surgery   Deb Bedoya 78 y o  female MRN: [id number]  Unit/Bed#: [place] [id number] Encounter: [id number]    Assessment/Plan:  3  77 y/o female with rectal bleeding due to unclear etiology     Plan:  -afebrile, leukocytosis present, nontoxic in appearance  -clinically patient is doing and feeling well, however reports formed BMs with bright, red blood from yesterday (x3) ; has not had a bowel movement yet today, however patient was ordering breakfast during morning rounds   -CT abdomen, pelvis (3/29/17) without evidence of active, ongoing GI hemorrhage, however if ongoing bleed could be slow in nature, below threshold of CT imaging, diffuse colonic diverticulosis without diverticulitis   -CTA or RBC scan if continued bleeding   -Hgb/Hct: climbing upwards from 6 2 to 6 8 and 19 1 to 21 6, respectively   -s/p EGD, flex sig, and colonoscopy, with no blood seen in the terminal ileum or colon most recently during the colonoscopy on 3/28/17   -will continue to monitor Hgb/Hct closely and will continue to monitor closely clinically; at this point no surgical plan per general surgery     Subjective/Objective   Chief Complaint:   Chief Complaint   Patient presents with   [facility] or Bloody Stool     Patient reports dark red blood in stool this morning  Denies any abdominal pain, N/V  Hx of diverticulitis, seen here 1/27 for the same per patient  Subjective: 78 y o  y/o female was seen and evaluated at bedside  Feeling well  Has not had a BM this AM yet  No constitutional symptoms reported  Blood pressure 159/73, pulse 94, temperature 98 7 °F (37 1 °C), temperature source Tympanic, resp  rate 17, height 5' 7" (1 702 m), weight 71 6 kg (157 lb 13 6 oz), SpO2 99 %  ,Body mass index is 24 72 kg/(m^2)      Invasive Devices     Peripheral Intravenous Line            Peripheral IV 03/29/17 Left Arm less than 1 day              Physical Exam:   General: Alert, cooperative and no distress  Lungs: Non labored breathing  Abdomen: Soft, non-tender  Extremity: no clubbing, cyanosis, or edema      Lab, Imaging and other studies:   I have personally reviewed pertinent lab results  , CBC: No results found for: WBC, HGB, HCT, MCV, PLT, ADJUSTEDWBC, MCH, MCHC, RDW, MPV, NRBC    Imaging: I have personally reviewed pertinent films in PACS  EKG, Pathology, and Other Studies: I have personally reviewed pertinent reports

## 2017-03-30 NOTE — NURSING NOTE
Patient discharge instructions reviewed  No questions or issues noted   Scripts filled by hospital pharmacy

## 2017-04-05 ENCOUNTER — APPOINTMENT (INPATIENT)
Dept: CT IMAGING | Facility: HOSPITAL | Age: 67
DRG: 378 | End: 2017-04-05
Payer: MEDICARE

## 2017-04-05 ENCOUNTER — HOSPITAL ENCOUNTER (INPATIENT)
Facility: HOSPITAL | Age: 67
LOS: 3 days | Discharge: HOME | DRG: 378 | End: 2017-04-08
Attending: EMERGENCY MEDICINE | Admitting: INTERNAL MEDICINE
Payer: MEDICARE

## 2017-04-05 DIAGNOSIS — K57.31 DIVERTICULOSIS OF LARGE INTESTINE WITH HEMORRHAGE: ICD-10-CM

## 2017-04-05 DIAGNOSIS — K62.5 RECTAL BLEEDING: Primary | ICD-10-CM

## 2017-04-05 DIAGNOSIS — K92.2 LOWER GI BLEED: ICD-10-CM

## 2017-04-05 LAB
ANION GAP SERPL CALCULATED.3IONS-SCNC: 6 MMOL/L (ref 4–13)
APTT PPP: 27 SECONDS (ref 24–36)
BASOPHILS # BLD AUTO: 0.05 THOUSANDS/ΜL (ref 0–0.1)
BASOPHILS NFR BLD AUTO: 1 % (ref 0–1)
BUN SERPL-MCNC: 12 MG/DL (ref 5–25)
CALCIUM SERPL-MCNC: 7.9 MG/DL (ref 8.3–10.1)
CHLORIDE SERPL-SCNC: 109 MMOL/L (ref 100–108)
CO2 SERPL-SCNC: 27 MMOL/L (ref 21–32)
CREAT SERPL-MCNC: 0.83 MG/DL (ref 0.6–1.3)
EOSINOPHIL # BLD AUTO: 0.01 THOUSAND/ΜL (ref 0–0.61)
EOSINOPHIL NFR BLD AUTO: 0 % (ref 0–6)
ERYTHROCYTE [DISTWIDTH] IN BLOOD BY AUTOMATED COUNT: 20.3 % (ref 11.6–15.1)
GFR SERPL CREATININE-BSD FRML MDRD: >60 ML/MIN/1.73SQ M
GLUCOSE SERPL-MCNC: 154 MG/DL (ref 65–140)
HCT VFR BLD AUTO: 21.8 % (ref 34.8–46.1)
HCT VFR BLD AUTO: 27.4 % (ref 34.8–46.1)
HGB BLD-MCNC: 6.5 G/DL (ref 11.5–15.4)
HGB BLD-MCNC: 8.1 G/DL (ref 11.5–15.4)
INR PPP: 1.05 (ref 0.86–1.16)
LYMPHOCYTES # BLD AUTO: 1.3 THOUSANDS/ΜL (ref 0.6–4.47)
LYMPHOCYTES NFR BLD AUTO: 12 % (ref 14–44)
MCH RBC QN AUTO: 31.4 PG (ref 26.8–34.3)
MCHC RBC AUTO-ENTMCNC: 29.6 G/DL (ref 31.4–37.4)
MCV RBC AUTO: 106 FL (ref 82–98)
MONOCYTES # BLD AUTO: 0.52 THOUSAND/ΜL (ref 0.17–1.22)
MONOCYTES NFR BLD AUTO: 5 % (ref 4–12)
NEUTROPHILS # BLD AUTO: 8.95 THOUSANDS/ΜL (ref 1.85–7.62)
NEUTS SEG NFR BLD AUTO: 82 % (ref 43–75)
NRBC BLD AUTO-RTO: 0 /100 WBCS
PLATELET # BLD AUTO: 607 THOUSANDS/UL (ref 149–390)
PMV BLD AUTO: 10.5 FL (ref 8.9–12.7)
POTASSIUM SERPL-SCNC: 4.4 MMOL/L (ref 3.5–5.3)
PROTHROMBIN TIME: 13.7 SECONDS (ref 12–14.3)
RBC # BLD AUTO: 2.58 MILLION/UL (ref 3.81–5.12)
SODIUM SERPL-SCNC: 142 MMOL/L (ref 136–145)
WBC # BLD AUTO: 10.83 THOUSAND/UL (ref 4.31–10.16)

## 2017-04-05 PROCEDURE — 85610 PROTHROMBIN TIME: CPT | Performed by: EMERGENCY MEDICINE

## 2017-04-05 PROCEDURE — 85730 THROMBOPLASTIN TIME PARTIAL: CPT | Performed by: EMERGENCY MEDICINE

## 2017-04-05 PROCEDURE — 85014 HEMATOCRIT: CPT | Performed by: PHYSICIAN ASSISTANT

## 2017-04-05 PROCEDURE — 36415 COLL VENOUS BLD VENIPUNCTURE: CPT | Performed by: EMERGENCY MEDICINE

## 2017-04-05 PROCEDURE — 80048 BASIC METABOLIC PNL TOTAL CA: CPT | Performed by: EMERGENCY MEDICINE

## 2017-04-05 PROCEDURE — 82272 OCCULT BLD FECES 1-3 TESTS: CPT

## 2017-04-05 PROCEDURE — 85025 COMPLETE CBC W/AUTO DIFF WBC: CPT | Performed by: EMERGENCY MEDICINE

## 2017-04-05 PROCEDURE — 99285 EMERGENCY DEPT VISIT HI MDM: CPT

## 2017-04-05 PROCEDURE — 85018 HEMOGLOBIN: CPT | Performed by: PHYSICIAN ASSISTANT

## 2017-04-05 PROCEDURE — 93005 ELECTROCARDIOGRAM TRACING: CPT

## 2017-04-05 RX ORDER — SODIUM CHLORIDE 9 MG/ML
75 INJECTION, SOLUTION INTRAVENOUS CONTINUOUS
Status: DISCONTINUED | OUTPATIENT
Start: 2017-04-05 | End: 2017-04-07

## 2017-04-05 RX ORDER — DOXYCYCLINE HYCLATE 50 MG/1
324 CAPSULE, GELATIN COATED ORAL
Status: DISCONTINUED | OUTPATIENT
Start: 2017-04-06 | End: 2017-04-06

## 2017-04-05 RX ORDER — ENALAPRIL MALEATE 10 MG/1
40 TABLET ORAL DAILY
Status: DISCONTINUED | OUTPATIENT
Start: 2017-04-06 | End: 2017-04-06

## 2017-04-05 RX ORDER — ACETAMINOPHEN 325 MG/1
650 TABLET ORAL EVERY 6 HOURS PRN
Status: DISCONTINUED | OUTPATIENT
Start: 2017-04-05 | End: 2017-04-08 | Stop reason: HOSPADM

## 2017-04-05 RX ORDER — ONDANSETRON 2 MG/ML
4 INJECTION INTRAMUSCULAR; INTRAVENOUS EVERY 6 HOURS PRN
Status: DISCONTINUED | OUTPATIENT
Start: 2017-04-05 | End: 2017-04-08 | Stop reason: HOSPADM

## 2017-04-05 RX ADMIN — SODIUM CHLORIDE 75 ML/HR: 0.9 INJECTION, SOLUTION INTRAVENOUS at 20:08

## 2017-04-05 RX ADMIN — IRON SUCROSE 200 MG: 20 INJECTION, SOLUTION INTRAVENOUS at 20:21

## 2017-04-05 RX ADMIN — SODIUM CHLORIDE 500 ML: 0.9 INJECTION, SOLUTION INTRAVENOUS at 16:01

## 2017-04-06 ENCOUNTER — APPOINTMENT (INPATIENT)
Dept: CT IMAGING | Facility: HOSPITAL | Age: 67
DRG: 378 | End: 2017-04-06
Payer: MEDICARE

## 2017-04-06 ENCOUNTER — APPOINTMENT (INPATIENT)
Dept: NUCLEAR MEDICINE | Facility: HOSPITAL | Age: 67
DRG: 378 | End: 2017-04-06
Payer: MEDICARE

## 2017-04-06 LAB
ERYTHROCYTE [DISTWIDTH] IN BLOOD BY AUTOMATED COUNT: 20.5 % (ref 11.6–15.1)
GLUCOSE SERPL-MCNC: 141 MG/DL (ref 65–140)
HCT VFR BLD AUTO: 21.7 % (ref 34.8–46.1)
HCT VFR BLD AUTO: 22.5 % (ref 34.8–46.1)
HCT VFR BLD AUTO: 23 % (ref 34.8–46.1)
HGB BLD-MCNC: 6.5 G/DL (ref 11.5–15.4)
HGB BLD-MCNC: 6.8 G/DL (ref 11.5–15.4)
HGB BLD-MCNC: 6.9 G/DL (ref 11.5–15.4)
MCH RBC QN AUTO: 31.8 PG (ref 26.8–34.3)
MCHC RBC AUTO-ENTMCNC: 30.2 G/DL (ref 31.4–37.4)
MCV RBC AUTO: 105 FL (ref 82–98)
PLATELET # BLD AUTO: 445 THOUSANDS/UL (ref 149–390)
PMV BLD AUTO: 10.2 FL (ref 8.9–12.7)
RBC # BLD AUTO: 2.14 MILLION/UL (ref 3.81–5.12)
WBC # BLD AUTO: 13.4 THOUSAND/UL (ref 4.31–10.16)

## 2017-04-06 PROCEDURE — A9560 TC99M LABELED RBC: HCPCS

## 2017-04-06 PROCEDURE — 85014 HEMATOCRIT: CPT | Performed by: INTERNAL MEDICINE

## 2017-04-06 PROCEDURE — 85027 COMPLETE CBC AUTOMATED: CPT | Performed by: PHYSICIAN ASSISTANT

## 2017-04-06 PROCEDURE — 74178 CT ABD&PLV WO CNTR FLWD CNTR: CPT

## 2017-04-06 PROCEDURE — 85018 HEMOGLOBIN: CPT | Performed by: INTERNAL MEDICINE

## 2017-04-06 PROCEDURE — 82948 REAGENT STRIP/BLOOD GLUCOSE: CPT

## 2017-04-06 PROCEDURE — 78278 ACUTE GI BLOOD LOSS IMAGING: CPT

## 2017-04-06 RX ADMIN — FERROUS GLUCONATE 324 MG: 324 TABLET ORAL at 06:44

## 2017-04-06 RX ADMIN — POLYETHYLENE GLYCOL 3350, SODIUM SULFATE ANHYDROUS, SODIUM BICARBONATE, SODIUM CHLORIDE, POTASSIUM CHLORIDE 4000 ML: 236; 22.74; 6.74; 5.86; 2.97 POWDER, FOR SOLUTION ORAL at 18:08

## 2017-04-06 RX ADMIN — IODIXANOL 100 ML: 320 INJECTION, SOLUTION INTRAVASCULAR at 01:07

## 2017-04-06 RX ADMIN — SODIUM CHLORIDE 75 ML/HR: 0.9 INJECTION, SOLUTION INTRAVENOUS at 12:21

## 2017-04-06 RX ADMIN — ENALAPRIL MALEATE 40 MG: 10 TABLET ORAL at 08:12

## 2017-04-06 RX ADMIN — IRON SUCROSE 200 MG: 20 INJECTION, SOLUTION INTRAVENOUS at 08:12

## 2017-04-07 ENCOUNTER — ANESTHESIA EVENT (INPATIENT)
Dept: GASTROENTEROLOGY | Facility: HOSPITAL | Age: 67
DRG: 378 | End: 2017-04-07
Payer: MEDICARE

## 2017-04-07 ENCOUNTER — ANESTHESIA (INPATIENT)
Dept: GASTROENTEROLOGY | Facility: HOSPITAL | Age: 67
DRG: 378 | End: 2017-04-07
Payer: MEDICARE

## 2017-04-07 ENCOUNTER — GENERIC CONVERSION - ENCOUNTER (OUTPATIENT)
Dept: OTHER | Facility: OTHER | Age: 67
End: 2017-04-07

## 2017-04-07 LAB
ANION GAP SERPL CALCULATED.3IONS-SCNC: 6 MMOL/L (ref 4–13)
ATRIAL RATE: 98 BPM
BASOPHILS # BLD AUTO: 0.02 THOUSANDS/ΜL (ref 0–0.1)
BASOPHILS NFR BLD AUTO: 0 % (ref 0–1)
BUN SERPL-MCNC: 10 MG/DL (ref 5–25)
CALCIUM SERPL-MCNC: 7.6 MG/DL (ref 8.3–10.1)
CHLORIDE SERPL-SCNC: 109 MMOL/L (ref 100–108)
CO2 SERPL-SCNC: 25 MMOL/L (ref 21–32)
CREAT SERPL-MCNC: 0.78 MG/DL (ref 0.6–1.3)
EOSINOPHIL # BLD AUTO: 0.08 THOUSAND/ΜL (ref 0–0.61)
EOSINOPHIL NFR BLD AUTO: 1 % (ref 0–6)
ERYTHROCYTE [DISTWIDTH] IN BLOOD BY AUTOMATED COUNT: 20.6 % (ref 11.6–15.1)
GFR SERPL CREATININE-BSD FRML MDRD: >60 ML/MIN/1.73SQ M
GLUCOSE SERPL-MCNC: 93 MG/DL (ref 65–140)
HCT VFR BLD AUTO: 22.5 % (ref 34.8–46.1)
HGB BLD-MCNC: 6.8 G/DL (ref 11.5–15.4)
LYMPHOCYTES # BLD AUTO: 2.79 THOUSANDS/ΜL (ref 0.6–4.47)
LYMPHOCYTES NFR BLD AUTO: 26 % (ref 14–44)
MCH RBC QN AUTO: 32.1 PG (ref 26.8–34.3)
MCHC RBC AUTO-ENTMCNC: 30.2 G/DL (ref 31.4–37.4)
MCV RBC AUTO: 106 FL (ref 82–98)
MONOCYTES # BLD AUTO: 0.76 THOUSAND/ΜL (ref 0.17–1.22)
MONOCYTES NFR BLD AUTO: 7 % (ref 4–12)
NEUTROPHILS # BLD AUTO: 7.29 THOUSANDS/ΜL (ref 1.85–7.62)
NEUTS SEG NFR BLD AUTO: 66 % (ref 43–75)
NRBC BLD AUTO-RTO: 0 /100 WBCS
P AXIS: 54 DEGREES
PLATELET # BLD AUTO: 435 THOUSANDS/UL (ref 149–390)
PMV BLD AUTO: 10.1 FL (ref 8.9–12.7)
POTASSIUM SERPL-SCNC: 3.1 MMOL/L (ref 3.5–5.3)
PR INTERVAL: 126 MS
QRS AXIS: 59 DEGREES
QRSD INTERVAL: 68 MS
QT INTERVAL: 338 MS
QTC INTERVAL: 431 MS
RBC # BLD AUTO: 2.12 MILLION/UL (ref 3.81–5.12)
SODIUM SERPL-SCNC: 140 MMOL/L (ref 136–145)
T WAVE AXIS: 64 DEGREES
VENTRICULAR RATE: 98 BPM
WBC # BLD AUTO: 10.94 THOUSAND/UL (ref 4.31–10.16)

## 2017-04-07 PROCEDURE — 85025 COMPLETE CBC W/AUTO DIFF WBC: CPT | Performed by: INTERNAL MEDICINE

## 2017-04-07 PROCEDURE — 0DJD8ZZ INSPECTION OF LOWER INTESTINAL TRACT, VIA NATURAL OR ARTIFICIAL OPENING ENDOSCOPIC: ICD-10-PCS | Performed by: INTERNAL MEDICINE

## 2017-04-07 PROCEDURE — 80048 BASIC METABOLIC PNL TOTAL CA: CPT | Performed by: INTERNAL MEDICINE

## 2017-04-07 RX ORDER — SODIUM CHLORIDE 9 MG/ML
125 INJECTION, SOLUTION INTRAVENOUS CONTINUOUS
Status: DISCONTINUED | OUTPATIENT
Start: 2017-04-07 | End: 2017-04-08 | Stop reason: HOSPADM

## 2017-04-07 RX ORDER — KETAMINE HYDROCHLORIDE 50 MG/ML
INJECTION, SOLUTION, CONCENTRATE INTRAMUSCULAR; INTRAVENOUS AS NEEDED
Status: DISCONTINUED | OUTPATIENT
Start: 2017-04-07 | End: 2017-04-07 | Stop reason: SURG

## 2017-04-07 RX ORDER — POTASSIUM CHLORIDE 20 MEQ/1
40 TABLET, EXTENDED RELEASE ORAL DAILY
Status: DISCONTINUED | OUTPATIENT
Start: 2017-04-07 | End: 2017-04-08 | Stop reason: HOSPADM

## 2017-04-07 RX ORDER — ONDANSETRON 2 MG/ML
4 INJECTION INTRAMUSCULAR; INTRAVENOUS EVERY 6 HOURS PRN
Status: DISCONTINUED | OUTPATIENT
Start: 2017-04-07 | End: 2017-04-07 | Stop reason: HOSPADM

## 2017-04-07 RX ADMIN — SODIUM CHLORIDE 75 ML/HR: 0.9 INJECTION, SOLUTION INTRAVENOUS at 05:51

## 2017-04-07 RX ADMIN — KETAMINE HYDROCHLORIDE 80 MG: 50 INJECTION INTRAMUSCULAR; INTRAVENOUS at 13:38

## 2017-04-07 RX ADMIN — SODIUM CHLORIDE: 0.9 INJECTION, SOLUTION INTRAVENOUS at 13:31

## 2017-04-07 RX ADMIN — POTASSIUM CHLORIDE 40 MEQ: 1500 TABLET, EXTENDED RELEASE ORAL at 15:09

## 2017-04-07 RX ADMIN — SODIUM CHLORIDE 125 ML/HR: 9 INJECTION, SOLUTION INTRAVENOUS at 20:35

## 2017-04-07 RX ADMIN — IRON SUCROSE 200 MG: 20 INJECTION, SOLUTION INTRAVENOUS at 08:07

## 2017-04-07 RX ADMIN — KETAMINE HYDROCHLORIDE 20 MG: 50 INJECTION INTRAMUSCULAR; INTRAVENOUS at 13:42

## 2017-04-07 RX ADMIN — KETAMINE HYDROCHLORIDE 20 MG: 50 INJECTION INTRAMUSCULAR; INTRAVENOUS at 13:47

## 2017-04-08 VITALS
DIASTOLIC BLOOD PRESSURE: 70 MMHG | WEIGHT: 156.53 LBS | SYSTOLIC BLOOD PRESSURE: 141 MMHG | TEMPERATURE: 98.7 F | OXYGEN SATURATION: 100 % | HEART RATE: 87 BPM | BODY MASS INDEX: 24.52 KG/M2 | RESPIRATION RATE: 16 BRPM

## 2017-04-08 PROCEDURE — G8980 MOBILITY D/C STATUS: HCPCS

## 2017-04-08 PROCEDURE — G8978 MOBILITY CURRENT STATUS: HCPCS

## 2017-04-08 PROCEDURE — G8979 MOBILITY GOAL STATUS: HCPCS

## 2017-04-08 PROCEDURE — 97161 PT EVAL LOW COMPLEX 20 MIN: CPT

## 2017-04-08 RX ADMIN — POTASSIUM CHLORIDE 40 MEQ: 1500 TABLET, EXTENDED RELEASE ORAL at 08:13

## 2017-04-08 RX ADMIN — SODIUM CHLORIDE 125 ML/HR: 9 INJECTION, SOLUTION INTRAVENOUS at 08:12

## 2017-04-08 RX ADMIN — SODIUM CHLORIDE 125 ML/HR: 9 INJECTION, SOLUTION INTRAVENOUS at 08:13

## 2017-04-08 RX ADMIN — IRON SUCROSE 200 MG: 20 INJECTION, SOLUTION INTRAVENOUS at 08:14

## 2017-04-09 ENCOUNTER — TELEPHONE (OUTPATIENT)
Dept: OTHER | Facility: HOSPITAL | Age: 67
End: 2017-04-09

## 2017-04-12 ENCOUNTER — GENERIC CONVERSION - ENCOUNTER (OUTPATIENT)
Dept: OTHER | Facility: OTHER | Age: 67
End: 2017-04-12

## 2017-04-27 ENCOUNTER — HOSPITAL ENCOUNTER (OUTPATIENT)
Dept: GASTROENTEROLOGY | Facility: HOSPITAL | Age: 67
Discharge: HOME | End: 2017-04-27
Payer: MEDICARE

## 2017-04-27 PROCEDURE — 91110 GI TRC IMG INTRAL ESOPH-ILE: CPT

## 2017-05-23 DIAGNOSIS — Z13.820 ENCOUNTER FOR SCREENING FOR OSTEOPOROSIS: ICD-10-CM

## 2017-05-23 DIAGNOSIS — Z12.31 ENCOUNTER FOR SCREENING MAMMOGRAM FOR MALIGNANT NEOPLASM OF BREAST: ICD-10-CM

## 2017-05-23 DIAGNOSIS — I10 ESSENTIAL (PRIMARY) HYPERTENSION: ICD-10-CM

## 2017-06-08 ENCOUNTER — GENERIC CONVERSION - ENCOUNTER (OUTPATIENT)
Dept: OTHER | Facility: OTHER | Age: 67
End: 2017-06-08

## 2017-06-08 ENCOUNTER — ALLSCRIPTS OFFICE VISIT (OUTPATIENT)
Dept: OTHER | Facility: OTHER | Age: 67
End: 2017-06-08

## 2017-06-09 ENCOUNTER — ALLSCRIPTS OFFICE VISIT (OUTPATIENT)
Dept: OTHER | Facility: OTHER | Age: 67
End: 2017-06-09

## 2017-07-05 ENCOUNTER — APPOINTMENT (OUTPATIENT)
Dept: LAB | Facility: CLINIC | Age: 67
End: 2017-07-05
Payer: MEDICARE

## 2017-07-05 ENCOUNTER — TRANSCRIBE ORDERS (OUTPATIENT)
Dept: LAB | Facility: CLINIC | Age: 67
End: 2017-07-05

## 2017-07-05 DIAGNOSIS — I10 ESSENTIAL (PRIMARY) HYPERTENSION: ICD-10-CM

## 2017-07-05 LAB
ALBUMIN SERPL BCP-MCNC: 3.6 G/DL (ref 3.5–5)
ALP SERPL-CCNC: 134 U/L (ref 46–116)
ALT SERPL W P-5'-P-CCNC: 49 U/L (ref 12–78)
ANION GAP SERPL CALCULATED.3IONS-SCNC: 5 MMOL/L (ref 4–13)
AST SERPL W P-5'-P-CCNC: 45 U/L (ref 5–45)
BASOPHILS # BLD AUTO: 0.03 THOUSANDS/ΜL (ref 0–0.1)
BASOPHILS NFR BLD AUTO: 0 % (ref 0–1)
BILIRUB SERPL-MCNC: 0.39 MG/DL (ref 0.2–1)
BUN SERPL-MCNC: 14 MG/DL (ref 5–25)
CALCIUM SERPL-MCNC: 9.2 MG/DL (ref 8.3–10.1)
CHLORIDE SERPL-SCNC: 103 MMOL/L (ref 100–108)
CHOLEST SERPL-MCNC: 165 MG/DL (ref 50–200)
CO2 SERPL-SCNC: 31 MMOL/L (ref 21–32)
CREAT SERPL-MCNC: 0.76 MG/DL (ref 0.6–1.3)
EOSINOPHIL # BLD AUTO: 0.14 THOUSAND/ΜL (ref 0–0.61)
EOSINOPHIL NFR BLD AUTO: 2 % (ref 0–6)
ERYTHROCYTE [DISTWIDTH] IN BLOOD BY AUTOMATED COUNT: 14.6 % (ref 11.6–15.1)
GFR SERPL CREATININE-BSD FRML MDRD: >60 ML/MIN/1.73SQ M
GLUCOSE P FAST SERPL-MCNC: 97 MG/DL (ref 65–99)
HCT VFR BLD AUTO: 43.4 % (ref 34.8–46.1)
HDLC SERPL-MCNC: 70 MG/DL (ref 40–60)
HGB BLD-MCNC: 14.2 G/DL (ref 11.5–15.4)
LDLC SERPL CALC-MCNC: 74 MG/DL (ref 0–100)
LYMPHOCYTES # BLD AUTO: 2.4 THOUSANDS/ΜL (ref 0.6–4.47)
LYMPHOCYTES NFR BLD AUTO: 31 % (ref 14–44)
MCH RBC QN AUTO: 31.1 PG (ref 26.8–34.3)
MCHC RBC AUTO-ENTMCNC: 32.7 G/DL (ref 31.4–37.4)
MCV RBC AUTO: 95 FL (ref 82–98)
MONOCYTES # BLD AUTO: 0.59 THOUSAND/ΜL (ref 0.17–1.22)
MONOCYTES NFR BLD AUTO: 8 % (ref 4–12)
NEUTROPHILS # BLD AUTO: 4.65 THOUSANDS/ΜL (ref 1.85–7.62)
NEUTS SEG NFR BLD AUTO: 59 % (ref 43–75)
NRBC BLD AUTO-RTO: 0 /100 WBCS
PLATELET # BLD AUTO: 263 THOUSANDS/UL (ref 149–390)
PMV BLD AUTO: 11.8 FL (ref 8.9–12.7)
POTASSIUM SERPL-SCNC: 3.7 MMOL/L (ref 3.5–5.3)
PROT SERPL-MCNC: 8.6 G/DL (ref 6.4–8.2)
RBC # BLD AUTO: 4.57 MILLION/UL (ref 3.81–5.12)
SODIUM SERPL-SCNC: 139 MMOL/L (ref 136–145)
TRIGL SERPL-MCNC: 103 MG/DL
WBC # BLD AUTO: 7.82 THOUSAND/UL (ref 4.31–10.16)

## 2017-07-05 PROCEDURE — 36415 COLL VENOUS BLD VENIPUNCTURE: CPT

## 2017-07-05 PROCEDURE — 80053 COMPREHEN METABOLIC PANEL: CPT

## 2017-07-05 PROCEDURE — 85025 COMPLETE CBC W/AUTO DIFF WBC: CPT

## 2017-07-05 PROCEDURE — 80061 LIPID PANEL: CPT

## 2017-07-09 ENCOUNTER — GENERIC CONVERSION - ENCOUNTER (OUTPATIENT)
Dept: OTHER | Facility: OTHER | Age: 67
End: 2017-07-09

## 2017-09-11 ENCOUNTER — GENERIC CONVERSION - ENCOUNTER (OUTPATIENT)
Dept: OTHER | Facility: OTHER | Age: 67
End: 2017-09-11

## 2017-11-10 ENCOUNTER — GENERIC CONVERSION - ENCOUNTER (OUTPATIENT)
Dept: OTHER | Facility: OTHER | Age: 67
End: 2017-11-10

## 2018-01-10 NOTE — RESULT NOTES
Verified Results  *VB - Fall Risk Assessment  (Dx Z13 89 Screen for Neurologic Disorder) [id number] 01:26PM [employer]     Test Name Result Flag Reference   Falls Risk      1 fall without injury in the past year     *VB - Urinary Incontinence Screen (Dx Z13 89 Screen for UI) [id number] 01:25PM [employer]     Test Name Result Flag Reference   Urinary Incontinence Assessment [id number]

## 2018-01-13 NOTE — RESULT NOTES
Verified Results  (1) LIPID PANEL, FASTING [id number] 08:54AM [employer]     Test Name Result Flag Reference   CHOLESTEROL 165 mg/dL     HDL,DIRECT 70 mg/dL H 40-60   Specimen collection should occur prior to Metamizole administration due to the potential for falsely depressed results  LDL CHOLESTEROL CALCULATED 74 mg/dL  0-100   Triglyceride:         Normal              <150 mg/dl       Borderline High    150-199 mg/dl       High               200-499 mg/dl       Very High          >499 mg/dl  Cholesterol:         Desirable        <200 mg/dl      Borderline High  200-239 mg/dl      High             >239 mg/dl  HDL Cholesterol:        High    >59 mg/dL      Low     <41 mg/dL  LDL CALCULATED:    This screening LDL is a calculated result  It does not have the accuracy of the Direct Measured LDL in the monitoring of patients with hyperlipidemia and/or statin therapy  Direct Measure LDL (BOQ483) must be ordered separately in these patients  TRIGLYCERIDES 103 mg/dL  <=150   Specimen collection should occur prior to N-Acetylcysteine or Metamizole administration due to the potential for falsely depressed results       (1) COMPREHENSIVE METABOLIC PANEL [id number] 00:13SU [employer]     Test Name Result Flag Reference   SODIUM 139 mmol/L  136-145   POTASSIUM 3 7 mmol/L  3 5-5 3   CHLORIDE 103 mmol/L  100-108   CARBON DIOXIDE 31 mmol/L  21-32   ANION GAP (CALC) 5 mmol/L  4-13   BLOOD UREA NITROGEN 14 mg/dL  5-25   CREATININE 0 76 mg/dL  0 60-1 30   Standardized to IDMS reference method   CALCIUM 9 2 mg/dL  8 3-10 1   BILI, TOTAL 0 39 mg/dL  0 20-1 00   ALK PHOSPHATAS 134 U/L H    ALT (SGPT) 49 U/L  12-78   AST(SGOT) 45 U/L  5-45   ALBUMIN 3 6 g/dL  3 5-5 0   TOTAL PROTEIN 8 6 g/dL H 6 4-8 2   eGFR Non-African American      >60 0 ml/min/1 73sq m   [facility] Disease Education Program recommendations are as follows:  GFR calculation is accurate only with a steady state creatinine  Chronic Kidney disease less than 60 ml/min/1 73 sq  meters  Kidney failure less than 15 ml/min/1 73 sq  meters  GLUCOSE FASTING 97 mg/dL  65-99     (1) CBC/PLT/DIFF [id number] 08:54AM [facility]     Test Name Result Flag Reference   WBC COUNT 7 82 Thousand/uL  4 31-10 16   RBC COUNT 4 57 Million/uL  3 81-5 12   HEMOGLOBIN 14 2 g/dL  11 5-15 4   HEMATOCRIT 43 4 %  34 8-46  1   MCV 95 fL  82-98   MCH 31 1 pg  26 8-34 3   MCHC 32 7 g/dL  31 4-37 4   RDW 14 6 %  11 6-15 1   MPV 11 8 fL  8 9-12 7   PLATELET COUNT 165 Thousands/uL  149-390   nRBC AUTOMATED 0 /100 WBCs     NEUTROPHILS RELATIVE PERCENT 59 %  43-75   LYMPHOCYTES RELATIVE PERCENT 31 %  14-44   MONOCYTES RELATIVE PERCENT 8 %  4-12   EOSINOPHILS RELATIVE PERCENT 2 %  0-6   BASOPHILS RELATIVE PERCENT 0 %  0-1   NEUTROPHILS ABSOLUTE COUNT 4 65 Thousands/? ??L  1 85-7 62   LYMPHOCYTES ABSOLUTE COUNT 2 40 Thousands/? ??L  0 60-4 47   MONOCYTES ABSOLUTE COUNT 0 59 Thousand/? ??L  0 17-1 22   EOSINOPHILS ABSOLUTE COUNT 0 14 Thousand/? ??L  0 00-0 61   BASOPHILS ABSOLUTE COUNT 0 03 Thousands/? ??L  0 00-0 10

## 2018-01-14 VITALS
SYSTOLIC BLOOD PRESSURE: 156 MMHG | OXYGEN SATURATION: 97 % | TEMPERATURE: 97.1 F | DIASTOLIC BLOOD PRESSURE: 72 MMHG | HEART RATE: 73 BPM | BODY MASS INDEX: 24.9 KG/M2 | WEIGHT: 159 LBS

## 2018-01-14 VITALS
BODY MASS INDEX: 24.11 KG/M2 | HEART RATE: 70 BPM | SYSTOLIC BLOOD PRESSURE: 150 MMHG | DIASTOLIC BLOOD PRESSURE: 80 MMHG | TEMPERATURE: 97.5 F | HEIGHT: 66 IN | OXYGEN SATURATION: 98 % | WEIGHT: 150 LBS

## 2018-01-14 VITALS
SYSTOLIC BLOOD PRESSURE: 198 MMHG | HEART RATE: 70 BPM | DIASTOLIC BLOOD PRESSURE: 100 MMHG | TEMPERATURE: 96.8 F | BODY MASS INDEX: 24.31 KG/M2 | RESPIRATION RATE: 18 BRPM | HEIGHT: 66 IN | WEIGHT: 151.25 LBS | OXYGEN SATURATION: 98 %

## 2018-01-14 NOTE — MISCELLANEOUS
Message   Recorded as Task   Date: 03/03/2016 10:05 AM, Created By: System   Task Name: Schedule Appointment   Assigned To: Carline North   Regarding Patient: Linzy Opitz, Status: Active   Comment:    System - 03 Mar 2016 10:05 AM     Preferred Communication: Mail  Ordering Site: [address] [place] [address]    Referred To: Thong Flor MD, [facility]  To Be Done: 03 Mar 2016   [facility] - 03 Mar 2016 11:19 AM     TASK REASSIGNED: Previously Assigned To Gael Herrera  Please call patient to schedule an appointment within 48 hours on their  The best time to reach the patient is in the  Patient prefers appointment to be  or     Last Mammogram date:[  ]    After this appointment is scheduled please reply back to [  ] team    Carline North - 03 Mar 2016 4:44 PM     TASK EDITED  Called and left message for patient to return call to office to schedule screening colono  Carline North - 15 Mar 2016 12:58 PM     TASK EDITED  Called and left another message for patient to return call to office  Will await patient contact  Active Problems    1  Encounter for screening colonoscopy (V76 51) (Z12 11)   2  Encounter for screening mammogram for breast cancer (V76 12) (Z12 31)   3  GERD (gastroesophageal reflux disease) (530 81) (K21 9)   4  Hypertension (401 9) (I10)   5  Screening for condition (V82 9) (Z13 9)    Current Meds   1  Enalapril Maleate 20 MG Oral Tablet; TAKE 2 TABLETS DAILY; Therapy: 30YTJ0604 to (Evaluate:93Udq6540)  Requested for: 05OPP1776; Last   Rx:03Mar2016 Ordered   2  Hydrochlorothiazide 25 MG Oral Tablet; take 1 tablet every day; Therapy: 10GYX7779 to (Evaluate:40Zrx6378)  Requested for: 56NMS5856; Last   Rx:03Mar2016 Ordered   3  Omeprazole 20 MG Oral Capsule Delayed Release; TAKE 1 CAPSULE Daily; Therapy: 41YDX2308 to (Evaluate:01Jun2016)  Requested for: 48RWC1694; Last   Rx:03Mar2016 Ordered    Allergies    1   No Known Drug Allergies    Signatures   Electronically signed by : Charli Mittal, ; Mar 15 2016 12:58PM EST                       (Author)

## 2018-01-16 NOTE — MISCELLANEOUS
Message  GI Reminder Recall [facility]:   Date: 11/10/2017   Dear Kerri Prader:     Review of our records shows you are due for the following: Follow Up Visit  Please call the following office to schedule your appointment:   [address], [address], [place], [place], [address] [facility] [phone]  We look forward to hearing from you! Sincerely,     [facility]      Signatures   Electronically signed by :  Layla Lindsey, ; Nov 10 2017  2:44PM EST                       (Author)

## 2018-01-18 NOTE — RESULT NOTES
Verified Results  (1) STOOL CULTURE 27Jan2017 12:08PM [facility]     Test Name Result Flag Reference   CLINICAL REPORT (Report)     Test:        Stool culture  Specimen Source:  Rectum  Specimen Type:   Stool  Specimen Date:   1/27/2017 12:08 PM  Result Date:    1/29/2017 2:19 PM  Result Status:   Final result  Resulting Lab:    [address] [address]            Tel: [phone]      CULTURE                                       ------------------                                   No Salmonella, Shigella or Campylobacter isolated      Shiga Toxin 1 NOT detected, Shiga Toxin 2 NOT detected       Discussion/Summary   stool negative

## 2018-05-10 ENCOUNTER — OFFICE VISIT (OUTPATIENT)
Dept: FAMILY MEDICINE CLINIC | Facility: CLINIC | Age: 68
End: 2018-05-10
Payer: MEDICARE

## 2018-05-10 VITALS
HEART RATE: 84 BPM | DIASTOLIC BLOOD PRESSURE: 84 MMHG | RESPIRATION RATE: 16 BRPM | BODY MASS INDEX: 23.27 KG/M2 | OXYGEN SATURATION: 98 % | HEIGHT: 67 IN | WEIGHT: 148.25 LBS | SYSTOLIC BLOOD PRESSURE: 132 MMHG | TEMPERATURE: 97.5 F

## 2018-05-10 DIAGNOSIS — I10 ESSENTIAL HYPERTENSION: Primary | ICD-10-CM

## 2018-05-10 PROCEDURE — 99051 MED SERV EVE/WKEND/HOLIDAY: CPT | Performed by: PHYSICIAN ASSISTANT

## 2018-05-10 PROCEDURE — 99213 OFFICE O/P EST LOW 20 MIN: CPT | Performed by: PHYSICIAN ASSISTANT

## 2018-05-10 RX ORDER — HYDROCHLOROTHIAZIDE 25 MG/1
25 TABLET ORAL DAILY
Qty: 90 TABLET | Refills: 3 | Status: SHIPPED | OUTPATIENT
Start: 2018-05-10 | End: 2019-06-04 | Stop reason: SDUPTHER

## 2018-05-10 RX ORDER — HYDROCHLOROTHIAZIDE 25 MG/1
1 TABLET ORAL
COMMUNITY
Start: 2018-03-04 | End: 2018-05-10 | Stop reason: SDUPTHER

## 2018-05-10 RX ORDER — LEDIPASVIR AND SOFOSBUVIR 90; 400 MG/1; MG/1
1 TABLET, FILM COATED ORAL
COMMUNITY
Start: 2018-05-09 | End: 2019-03-22

## 2018-05-10 RX ORDER — ENALAPRIL MALEATE 20 MG/1
40 TABLET ORAL DAILY
Qty: 180 TABLET | Refills: 3 | Status: SHIPPED | OUTPATIENT
Start: 2018-05-10 | End: 2019-06-04 | Stop reason: SDUPTHER

## 2018-05-10 NOTE — PROGRESS NOTES
Assessment/Plan:    Hypertension  Stable  Lab work reviewed from [facility]   No concerns at this time  Continue taking Enalpril 40 mg daily, and hydrochlorothiazide 25 mg daily  Informed patient that her weight is fine today  Her BMI is within normal range  She really should have no concern with putting on more weight  If she does have concerns that she is rapidly losing weight, would recommend making regular follow-up appointments monitor progress  Diagnoses and all orders for this visit:    Essential hypertension  -     enalapril (VASOTEC) 20 mg tablet; Take 2 tablets (40 mg total) by mouth daily  -     hydrochlorothiazide (HYDRODIURIL) 25 mg tablet; Take 1 tablet (25 mg total) by mouth daily    Other orders  -     Discontinue: hydrochlorothiazide (HYDRODIURIL) 25 mg tablet; Take 1 tablet by mouth  -     Ledipasvir-Sofosbuvir (HARVONI) tablet; Take 1 tablet by mouth          Subjective:      Patient ID: Chucky Delcid is a 70 y o  female  69-year-old female presenting for routine follow-up of hypertension  Patient has no concerns or questions today  States that she has had lab work recently done over at [facility]   Has been taking her blood pressure medications as directed  Has not had any concerns with elevated blood pressure  Denies any headaches, dizziness, blurry vision, chest pain, palpitations, swelling in lower extremities  States she eats a healthy diet  Is currently being treated for hepatitis-C  Has been losing weight, but states she is trying to put some more on  Has a low-sodium diet  The following portions of the patient's history were reviewed and updated as appropriate:   She  has a past medical history of Arthritis; Diverticulosis of colon; bleeding disorder; and Hypertension    She   Patient Active Problem List    Diagnosis Date Noted    Rectal bleeding 04/05/2017    Arthritis     Hx of bleeding disorder     Acute blood loss anemia 03/26/2017    Syncope and collapse 03/26/2017    Blood in stool 03/25/2017    Diverticulosis of colon     Hypertension     Sinus tachycardia 01/27/2017    Lower GI bleed 01/27/2017    Diarrhea 01/27/2017     She  has a past surgical history that includes Tubal ligation; Colonoscopy (N/A, 3/28/2017); pr sigmoidoscopy flx dx w/collj spec br/wa if pfrmd (N/A, 3/27/2017); Esophagogastroduodenoscopy (N/A, 3/27/2017); Colonoscopy (N/A, 3/26/2017); Colonoscopy (N/A, 1/27/2017); and Colonoscopy (N/A, 4/7/2017)  Her family history includes Cancer in her mother  She  reports that she has never smoked  She has never used smokeless tobacco  She reports that she does not drink alcohol or use drugs  Current Outpatient Prescriptions   Medication Sig Dispense Refill    hydrochlorothiazide (HYDRODIURIL) 25 mg tablet Take 1 tablet (25 mg total) by mouth daily 90 tablet 3    Ledipasvir-Sofosbuvir (HARVONI) tablet Take 1 tablet by mouth      Calcium-Magnesium-Vitamin D (CALCIUM 500 PO) Take 1 tablet by mouth daily        docusate sodium (COLACE) 100 mg capsule Take 1 capsule by mouth 2 (two) times a day for 30 days 60 capsule 0    enalapril (VASOTEC) 20 mg tablet Take 2 tablets (40 mg total) by mouth daily 180 tablet 3    ferrous gluconate (FERGON) 324 mg tablet Take 1 tablet by mouth 2 (two) times a day before meals for 30 days 60 tablet 0    FIBER PO Take 1 tablet by mouth daily         No current facility-administered medications for this visit  She has No Known Allergies       Review of Systems   Constitutional: Negative for chills, fatigue and fever  HENT: Negative for congestion, ear pain, hearing loss, rhinorrhea and sore throat  Eyes: Negative for pain and visual disturbance  Respiratory: Negative for cough, shortness of breath and wheezing  Cardiovascular: Negative for chest pain, palpitations and leg swelling     Gastrointestinal: Negative for abdominal pain, blood in stool, constipation, diarrhea, nausea and vomiting  Endocrine: Negative for cold intolerance, heat intolerance and polyuria  Genitourinary: Negative for difficulty urinating, dysuria, frequency, hematuria and urgency  Musculoskeletal: Negative for arthralgias, joint swelling and myalgias  Skin: Negative for rash and wound  Neurological: Negative for dizziness, syncope, weakness, numbness and headaches  Psychiatric/Behavioral: Negative for dysphoric mood and sleep disturbance  The patient is not nervous/anxious  Objective:      /84 (BP Location: Right arm, Patient Position: Sitting, Cuff Size: Standard)   Pulse 84   Temp 97 5 °F (36 4 °C) (Tympanic)   Resp 16   Ht 5' 7" (1 702 m)   Wt 67 2 kg (148 lb 4 oz)   SpO2 98%   BMI 23 22 kg/m²          Physical Exam   Constitutional: She is oriented to person, place, and time  She appears well-developed and well-nourished  No distress  HENT:   Right Ear: Hearing, tympanic membrane, external ear and ear canal normal    Left Ear: Hearing, tympanic membrane, external ear and ear canal normal    Nose: Nose normal    Mouth/Throat: Oropharynx is clear and moist and mucous membranes are normal  No oropharyngeal exudate  Eyes: Conjunctivae, EOM and lids are normal  Pupils are equal, round, and reactive to light  Neck: Normal range of motion  Neck supple  Cardiovascular: Normal rate, regular rhythm, normal heart sounds and normal pulses  Exam reveals no gallop and no friction rub  No murmur heard  Pulmonary/Chest: Effort normal and breath sounds normal  No respiratory distress  She has no wheezes  She has no rales  Abdominal: Soft  Normal appearance and bowel sounds are normal  She exhibits no distension  There is no tenderness  There is no rebound and no guarding  Musculoskeletal: Normal range of motion  She exhibits no edema  Lymphadenopathy:     She has no cervical adenopathy  Neurological: She is alert and oriented to person, place, and time   She has normal reflexes  No cranial nerve deficit  Skin: Skin is warm and dry  No rash noted  She is not diaphoretic  Psychiatric: She has a normal mood and affect   Her behavior is normal  Thought content normal

## 2018-05-10 NOTE — ASSESSMENT & PLAN NOTE
Stable  Lab work reviewed from [facility]   No concerns at this time  Continue taking Enalpril 40 mg daily, and hydrochlorothiazide 25 mg daily

## 2019-03-22 ENCOUNTER — HOSPITAL ENCOUNTER (EMERGENCY)
Facility: HOSPITAL | Age: 69
Discharge: HOME | End: 2019-03-22
Attending: EMERGENCY MEDICINE
Payer: MEDICARE

## 2019-03-22 VITALS
RESPIRATION RATE: 15 BRPM | SYSTOLIC BLOOD PRESSURE: 146 MMHG | HEART RATE: 75 BPM | TEMPERATURE: 97.1 F | DIASTOLIC BLOOD PRESSURE: 69 MMHG | OXYGEN SATURATION: 92 %

## 2019-03-22 DIAGNOSIS — R19.7 DIARRHEA: Primary | ICD-10-CM

## 2019-03-22 DIAGNOSIS — E87.6 HYPOKALEMIA: ICD-10-CM

## 2019-03-22 LAB
ALBUMIN SERPL BCP-MCNC: 3.7 G/DL (ref 3.5–5)
ALP SERPL-CCNC: 95 U/L (ref 46–116)
ALT SERPL W P-5'-P-CCNC: 16 U/L (ref 12–78)
ANION GAP SERPL CALCULATED.3IONS-SCNC: 11 MMOL/L (ref 4–13)
AST SERPL W P-5'-P-CCNC: 23 U/L (ref 5–45)
BASOPHILS # BLD AUTO: 0.03 THOUSANDS/ΜL (ref 0–0.1)
BASOPHILS NFR BLD AUTO: 0 % (ref 0–1)
BILIRUB DIRECT SERPL-MCNC: 0.15 MG/DL (ref 0–0.2)
BILIRUB SERPL-MCNC: 0.41 MG/DL (ref 0.2–1)
BILIRUB UR QL STRIP: NEGATIVE
BUN SERPL-MCNC: 20 MG/DL (ref 5–25)
CALCIUM SERPL-MCNC: 8.8 MG/DL (ref 8.3–10.1)
CHLORIDE SERPL-SCNC: 103 MMOL/L (ref 100–108)
CLARITY UR: CLEAR
CO2 SERPL-SCNC: 25 MMOL/L (ref 21–32)
COLOR UR: YELLOW
COLOR, POC: YELLOW
CREAT SERPL-MCNC: 0.87 MG/DL (ref 0.6–1.3)
EOSINOPHIL # BLD AUTO: 0.06 THOUSAND/ΜL (ref 0–0.61)
EOSINOPHIL NFR BLD AUTO: 1 % (ref 0–6)
ERYTHROCYTE [DISTWIDTH] IN BLOOD BY AUTOMATED COUNT: 12.6 % (ref 11.6–15.1)
GFR SERPL CREATININE-BSD FRML MDRD: 68 ML/MIN/1.73SQ M
GLUCOSE SERPL-MCNC: 112 MG/DL (ref 65–140)
GLUCOSE UR STRIP-MCNC: NEGATIVE MG/DL
HCT VFR BLD AUTO: 46.4 % (ref 34.8–46.1)
HGB BLD-MCNC: 15.5 G/DL (ref 11.5–15.4)
HGB UR QL STRIP.AUTO: ABNORMAL
IMM GRANULOCYTES # BLD AUTO: 0.03 THOUSAND/UL (ref 0–0.2)
IMM GRANULOCYTES NFR BLD AUTO: 0 % (ref 0–2)
KETONES UR STRIP-MCNC: ABNORMAL MG/DL
LEUKOCYTE ESTERASE UR QL STRIP: ABNORMAL
LIPASE SERPL-CCNC: 209 U/L (ref 73–393)
LYMPHOCYTES # BLD AUTO: 0.72 THOUSANDS/ΜL (ref 0.6–4.47)
LYMPHOCYTES NFR BLD AUTO: 9 % (ref 14–44)
MAGNESIUM SERPL-MCNC: 2.1 MG/DL (ref 1.6–2.6)
MCH RBC QN AUTO: 32.4 PG (ref 26.8–34.3)
MCHC RBC AUTO-ENTMCNC: 33.4 G/DL (ref 31.4–37.4)
MCV RBC AUTO: 97 FL (ref 82–98)
MONOCYTES # BLD AUTO: 0.59 THOUSAND/ΜL (ref 0.17–1.22)
MONOCYTES NFR BLD AUTO: 8 % (ref 4–12)
NEUTROPHILS # BLD AUTO: 6.47 THOUSANDS/ΜL (ref 1.85–7.62)
NEUTS SEG NFR BLD AUTO: 82 % (ref 43–75)
NITRITE UR QL STRIP: NEGATIVE
NRBC BLD AUTO-RTO: 0 /100 WBCS
PH UR STRIP.AUTO: 5.5 [PH] (ref 4.5–8)
PLATELET # BLD AUTO: 217 THOUSANDS/UL (ref 149–390)
PMV BLD AUTO: 11.1 FL (ref 8.9–12.7)
POTASSIUM SERPL-SCNC: 2.8 MMOL/L (ref 3.5–5.3)
PROT SERPL-MCNC: 8.1 G/DL (ref 6.4–8.2)
PROT UR STRIP-MCNC: ABNORMAL MG/DL
RBC # BLD AUTO: 4.78 MILLION/UL (ref 3.81–5.12)
SODIUM SERPL-SCNC: 139 MMOL/L (ref 136–145)
SP GR UR STRIP.AUTO: 1.02 (ref 1–1.03)
TSH SERPL DL<=0.05 MIU/L-ACNC: 2.4 UIU/ML (ref 0.36–3.74)
UROBILINOGEN UR QL STRIP.AUTO: 0.2 E.U./DL
WBC # BLD AUTO: 7.9 THOUSAND/UL (ref 4.31–10.16)

## 2019-03-22 PROCEDURE — 80076 HEPATIC FUNCTION PANEL: CPT | Performed by: EMERGENCY MEDICINE

## 2019-03-22 PROCEDURE — 81002 URINALYSIS NONAUTO W/O SCOPE: CPT | Performed by: EMERGENCY MEDICINE

## 2019-03-22 PROCEDURE — 80048 BASIC METABOLIC PNL TOTAL CA: CPT | Performed by: EMERGENCY MEDICINE

## 2019-03-22 PROCEDURE — 36415 COLL VENOUS BLD VENIPUNCTURE: CPT | Performed by: EMERGENCY MEDICINE

## 2019-03-22 PROCEDURE — 83690 ASSAY OF LIPASE: CPT | Performed by: EMERGENCY MEDICINE

## 2019-03-22 PROCEDURE — 83735 ASSAY OF MAGNESIUM: CPT | Performed by: EMERGENCY MEDICINE

## 2019-03-22 PROCEDURE — 85025 COMPLETE CBC W/AUTO DIFF WBC: CPT | Performed by: EMERGENCY MEDICINE

## 2019-03-22 PROCEDURE — 96361 HYDRATE IV INFUSION ADD-ON: CPT

## 2019-03-22 PROCEDURE — 84443 ASSAY THYROID STIM HORMONE: CPT | Performed by: EMERGENCY MEDICINE

## 2019-03-22 PROCEDURE — 96360 HYDRATION IV INFUSION INIT: CPT

## 2019-03-22 PROCEDURE — 99284 EMERGENCY DEPT VISIT MOD MDM: CPT

## 2019-03-22 RX ORDER — POTASSIUM CHLORIDE 20 MEQ/1
20 TABLET, EXTENDED RELEASE ORAL 2 TIMES DAILY
Qty: 10 TABLET | Refills: 0 | Status: SHIPPED | OUTPATIENT
Start: 2019-03-22 | End: 2020-06-01 | Stop reason: SDUPTHER

## 2019-03-22 RX ORDER — POTASSIUM CHLORIDE 20 MEQ/1
40 TABLET, EXTENDED RELEASE ORAL ONCE
Status: COMPLETED | OUTPATIENT
Start: 2019-03-22 | End: 2019-03-22

## 2019-03-22 RX ORDER — DIPHENOXYLATE HYDROCHLORIDE AND ATROPINE SULFATE 2.5; .025 MG/1; MG/1
1 TABLET ORAL 4 TIMES DAILY PRN
Qty: 30 TABLET | Refills: 0 | Status: SHIPPED | OUTPATIENT
Start: 2019-03-22 | End: 2019-04-01

## 2019-03-22 RX ADMIN — POTASSIUM CHLORIDE 40 MEQ: 1500 TABLET, EXTENDED RELEASE ORAL at 23:15

## 2019-03-22 RX ADMIN — SODIUM CHLORIDE 1000 ML: 0.9 INJECTION, SOLUTION INTRAVENOUS at 21:40

## 2019-03-23 NOTE — ED NOTES
Not enough urine sample to send down to the lab at this time  Patient orders placed for discharge            Alejandra Lindsay RN  03/22/19 3767

## 2019-03-23 NOTE — DISCHARGE INSTRUCTIONS
Take the Potassium twice daily for the next 5 days  Call your family doctor, you should be seen in the office for further evaluation  You should have outpatient testing performed to recheck your potassium  You can take the Lomotil for diarrhea, discontinue this when your symptoms start to improve

## 2019-03-23 NOTE — ED PROVIDER NOTES
History  Chief Complaint   Patient presents with    Diarrhea     pt reports diarrhea since wednesday  denies n/v, denies abdominal pain  71 YO female presents with 2 days of diarrhea  States this was gradual in onset, began during the evening  This is described as watery, nonbloody  Pt states she was then well yesterday but the diarrhea again returned in the early evening  Again today pt was well with recurrence ~2 hours prior to arrival  Pt has been taking imodium without relief of her symptoms  She denies associated nausea and abdominal pain  States she has had recurrent episodes of diarrhea but this is usually infrequent  Pt denies known sick contacts or suspicious food intake  She has been trying to hydrate but states she has diarrhea as soon as she drinks something  Pt denies CP/SOB/F/C/N/V, no dysuria, burning on urination or blood in urine  History provided by:  Patient and relative   used: No    Diarrhea   Quality:  Watery  Severity:  Moderate  Onset quality:  Gradual  Duration:  2 days  Timing:  Intermittent  Progression:  Unchanged  Relieved by:  Nothing  Worsened by:  Nothing  Ineffective treatments:  Anti-motility medications  Associated symptoms: no abdominal pain, no arthralgias, no chills, no fever, no headaches and no vomiting        Prior to Admission Medications   Prescriptions Last Dose Informant Patient Reported? Taking?    Calcium-Magnesium-Vitamin D (CALCIUM 500 PO)   Yes Yes   Sig: Take 1 tablet by mouth daily     FIBER PO   Yes Yes   Sig: Take 1 tablet by mouth daily     enalapril (VASOTEC) 20 mg tablet   No Yes   Sig: Take 2 tablets (40 mg total) by mouth daily   ferrous gluconate (FERGON) 324 mg tablet   No Yes   Sig: Take 1 tablet by mouth 2 (two) times a day before meals for 30 days   hydrochlorothiazide (HYDRODIURIL) 25 mg tablet   No Yes   Sig: Take 1 tablet (25 mg total) by mouth daily      Facility-Administered Medications: None       Past Medical History:   Diagnosis Date    Arthritis     Diverticulosis of colon     Hx of bleeding disorder     rectal bleeding    Hypertension        Past Surgical History:   Procedure Laterality Date    COLONOSCOPY N/A 3/28/2017    Procedure: COLONOSCOPY;  Surgeon: Tyson Garcia MD;  Location: [facility]; Service:     COLONOSCOPY N/A 3/26/2017    Procedure: COLONOSCOPY with hemostasis clip placement;  Surgeon: Cristina Yepez MD;  Location: [facility]; Service:     COLONOSCOPY N/A 1/27/2017    Procedure: COLONOSCOPY;  Surgeon: Vi Colon MD;  Location: [facility]; Service:     COLONOSCOPY N/A 4/7/2017    Procedure: COLONOSCOPY;  Surgeon: Cristina Yepez MD;  Location: [facility]; Service:     ESOPHAGOGASTRODUODENOSCOPY N/A 3/27/2017    Procedure: ESOPHAGOGASTRODUODENOSCOPY (EGD) with APC; Surgeon: Tyson Garcia MD;  Location: [facility]; Service:     MA SIGMOIDOSCOPY FLX DX W/COLLJ SPEC BR/WA IF PFRMD N/A 3/27/2017    Procedure: Schreiber Nick;  Surgeon: Tyson Garcia MD;  Location: [facility]; Service: Gastroenterology    TUBAL LIGATION         Family History   Problem Relation Age of Onset    Cancer Mother      I have reviewed and agree with the history as documented  Social History     Tobacco Use    Smoking status: Never Smoker    Smokeless tobacco: Never Used   Substance Use Topics    Alcohol use: No    Drug use: No        Review of Systems   Constitutional: Negative for chills, fatigue and fever  HENT: Negative for dental problem  Eyes: Negative for visual disturbance  Respiratory: Negative for shortness of breath  Cardiovascular: Negative for chest pain  Gastrointestinal: Positive for diarrhea  Negative for abdominal pain and vomiting  Genitourinary: Negative for dysuria and frequency  Musculoskeletal: Negative for arthralgias  Skin: Negative for rash  Neurological: Negative for dizziness, weakness, light-headedness and headaches     Psychiatric/Behavioral: Negative for agitation, behavioral problems and confusion  All other systems reviewed and are negative  Physical Exam  Physical Exam   Constitutional: She is oriented to person, place, and time  She appears well-developed and well-nourished  HENT:   Head: Normocephalic and atraumatic  Eyes: EOM are normal    Neck: Normal range of motion  Cardiovascular: Normal rate, regular rhythm and normal heart sounds  Pulmonary/Chest: Effort normal and breath sounds normal    Abdominal: Soft  There is no tenderness  Musculoskeletal: Normal range of motion  Neurological: She is alert and oriented to person, place, and time  Skin: Skin is warm and dry  Psychiatric: She has a normal mood and affect  Her behavior is normal  Thought content normal    Nursing note and vitals reviewed        Vital Signs  ED Triage Vitals   Temperature Pulse Respirations Blood Pressure SpO2   03/22/19 2042 03/22/19 2042 03/22/19 2042 03/22/19 2043 03/22/19 2042   (!) 97 1 °F (36 2 °C) 100 14 148/70 98 %      Temp Source Heart Rate Source Patient Position - Orthostatic VS BP Location FiO2 (%)   03/22/19 2042 03/22/19 2042 03/22/19 2042 03/22/19 2042 --   Temporal Monitor Sitting Right arm       Pain Score       --                  Vitals:    03/22/19 2042 03/22/19 2043 03/22/19 2311   BP:  148/70 146/69   Pulse: 100  75   Patient Position - Orthostatic VS: Sitting  Lying         Visual Acuity      ED Medications  Medications   sodium chloride 0 9 % bolus 1,000 mL (0 mL Intravenous Stopped 3/22/19 2353)   potassium chloride (K-DUR,KLOR-CON) CR tablet 40 mEq (40 mEq Oral Given 3/22/19 2315)       Diagnostic Studies  Results Reviewed     Procedure Component Value Units Date/Time    POCT urinalysis dipstick [[id number]]  (Normal) Resulted:  03/22/19 2342    Lab Status:  Final result Specimen:  Urine Updated:  03/22/19 2353     Color, UA yellow    ED Urine Macroscopic [[id number]]  (Abnormal) Collected:  03/22/19 2345    Lab Status:  Final result Specimen:  Urine Updated:  03/22/19 2342     Color, UA Yellow     Clarity, UA Clear     pH, UA 5 5     Leukocytes, UA Small     Nitrite, UA Negative     Protein, UA 30 (1+) mg/dl      Glucose, UA Negative mg/dl      Ketones, UA Trace mg/dl      Urobilinogen, UA 0 2 E U /dl      Bilirubin, UA Negative     Blood, UA Large     Specific Gravity, UA 1 025    Narrative:       CLINITEK RESULT    Hepatic function panel [[id number]]  (Normal) Collected:  03/22/19 2225    Lab Status:  Final result Specimen:  Blood from Hand, Left Updated:  03/22/19 2303     Total Bilirubin 0 41 mg/dL      Bilirubin, Direct 0 15 mg/dL      Alkaline Phosphatase 95 U/L      AST 23 U/L      ALT 16 U/L      Total Protein 8 1 g/dL      Albumin 3 7 g/dL     TSH [[id number]]  (Normal) Collected:  03/22/19 2225    Lab Status:  Final result Specimen:  Blood from Hand, Left Updated:  03/22/19 2303     TSH 3RD GENERATON 2 399 uIU/mL     Narrative:       Patients undergoing fluorescein dye angiography may retain small amounts of fluorescein in the body for 48-72 hours post procedure  Samples containing fluorescein can produce falsely depressed TSH values  If the patient had this procedure,a specimen should be resubmitted post fluorescein clearance      Magnesium [[id number]]  (Normal) Collected:  03/22/19 2225    Lab Status:  Final result Specimen:  Blood from Hand, Left Updated:  03/22/19 2303     Magnesium 2 1 mg/dL     Lipase [[id number]]  (Normal) Collected:  03/22/19 2225    Lab Status:  Final result Specimen:  Blood from Hand, Left Updated:  03/22/19 2303     Lipase 209 u/L     Basic metabolic panel [[id number]]  (Abnormal) Collected:  03/22/19 2225    Lab Status:  Final result Specimen:  Blood from Hand, Left Updated:  03/22/19 2253     Sodium 139 mmol/L      Potassium 2 8 mmol/L      Chloride 103 mmol/L      CO2 25 mmol/L      ANION GAP 11 mmol/L      BUN 20 mg/dL      Creatinine 0 87 mg/dL      Glucose 112 mg/dL      Calcium 8 8 mg/dL      eGFR 68 ml/min/1 73sq m Narrative:       National Kidney Disease Education Program recommendations are as follows:  GFR calculation is accurate only with a steady state creatinine  Chronic Kidney disease less than 60 ml/min/1 73 sq  meters  Kidney failure less than 15 ml/min/1 73 sq  meters  CBC and differential [[id number]]  (Abnormal) Collected:  03/22/19 2138    Lab Status:  Final result Specimen:  Blood from Arm, Right Updated:  03/22/19 2155     WBC 7 90 Thousand/uL      RBC 4 78 Million/uL      Hemoglobin 15 5 g/dL      Hematocrit 46 4 %      MCV 97 fL      MCH 32 4 pg      MCHC 33 4 g/dL      RDW 12 6 %      MPV 11 1 fL      Platelets 937 Thousands/uL      nRBC 0 /100 WBCs      Neutrophils Relative 82 %      Immat GRANS % 0 %      Lymphocytes Relative 9 %      Monocytes Relative 8 %      Eosinophils Relative 1 %      Basophils Relative 0 %      Neutrophils Absolute 6 47 Thousands/µL      Immature Grans Absolute 0 03 Thousand/uL      Lymphocytes Absolute 0 72 Thousands/µL      Monocytes Absolute 0 59 Thousand/µL      Eosinophils Absolute 0 06 Thousand/µL      Basophils Absolute 0 03 Thousands/µL                  No orders to display              Procedures  Procedures       Phone Contacts  ED Phone Contact    ED Course                               MDM  Number of Diagnoses or Management Options  Diarrhea: new and requires workup  Hypokalemia: new and requires workup  Diagnosis management comments: 1  Diarrhea - Pt with no abdominal pain, benign abdominal exam  Will check CBC, metabolic panel for electrolyte abnormalities and dehydration, LFT's for GB dysfunction, lipase for pancreatic inflammation  Will give hydration           Amount and/or Complexity of Data Reviewed  Clinical lab tests: ordered and reviewed  Obtain history from someone other than the patient: yes    Patient Progress  Patient progress: stable      Disposition  Final diagnoses:   Diarrhea   Hypokalemia     Time reflects when diagnosis was documented in both MDM as applicable and the Disposition within this note     Time User Action Codes Description Comment    3/22/2019 11:26 PM Jaycee Coleman Add [R19 7] Diarrhea     3/22/2019 11:26 PM Mikayladamari Ice E Add [E87 6] Hypokalemia       ED Disposition     ED Disposition Condition Date/Time Comment    Discharge Stable Fri Mar 22, 2019 11:26 PM Tita Frank discharge to home/self care  Follow-up Information    None         Discharge Medication List as of 3/22/2019 11:28 PM      START taking these medications    Details   diphenoxylate-atropine (LOMOTIL) 2 5-0 025 mg per tablet Take 1 tablet by mouth 4 (four) times a day as needed for diarrhea for up to 10 days, Starting Fri 3/22/2019, Until Mon 4/1/2019, Print      potassium chloride (K-DUR,KLOR-CON) 20 mEq tablet Take 1 tablet (20 mEq total) by mouth 2 (two) times a day for 5 days, Starting Fri 3/22/2019, Until Wed 3/27/2019, Print         CONTINUE these medications which have NOT CHANGED    Details   Calcium-Magnesium-Vitamin D (CALCIUM 500 PO) Take 1 tablet by mouth daily  , Until Discontinued, Historical Med      enalapril (VASOTEC) 20 mg tablet Take 2 tablets (40 mg total) by mouth daily, Starting Thu 5/10/2018, Normal      ferrous gluconate (FERGON) 324 mg tablet Take 1 tablet by mouth 2 (two) times a day before meals for 30 days, Starting Thu 3/30/2017, Until Fri 3/22/2019, Print      FIBER PO Take 1 tablet by mouth daily  , Until Discontinued, Historical Med      hydrochlorothiazide (HYDRODIURIL) 25 mg tablet Take 1 tablet (25 mg total) by mouth daily, Starting Thu 5/10/2018, Normal           No discharge procedures on file      ED Provider  Electronically Signed by           Sharon Donald MD  03/23/19 2107

## 2019-06-04 ENCOUNTER — OFFICE VISIT (OUTPATIENT)
Dept: FAMILY MEDICINE CLINIC | Facility: CLINIC | Age: 69
End: 2019-06-04

## 2019-06-04 VITALS
BODY MASS INDEX: 23.81 KG/M2 | TEMPERATURE: 97.4 F | WEIGHT: 152 LBS | RESPIRATION RATE: 16 BRPM | HEART RATE: 90 BPM | SYSTOLIC BLOOD PRESSURE: 130 MMHG | OXYGEN SATURATION: 98 % | DIASTOLIC BLOOD PRESSURE: 84 MMHG

## 2019-06-04 DIAGNOSIS — I10 ESSENTIAL HYPERTENSION: Primary | ICD-10-CM

## 2019-06-04 DIAGNOSIS — K58.0 IRRITABLE BOWEL SYNDROME WITH DIARRHEA: ICD-10-CM

## 2019-06-04 DIAGNOSIS — E87.6 HYPOKALEMIA: ICD-10-CM

## 2019-06-04 DIAGNOSIS — R25.2 MUSCLE CRAMP: ICD-10-CM

## 2019-06-04 DIAGNOSIS — B18.2 CHRONIC HEPATITIS C WITHOUT HEPATIC COMA (HCC): ICD-10-CM

## 2019-06-04 PROCEDURE — 99213 OFFICE O/P EST LOW 20 MIN: CPT | Performed by: PHYSICIAN ASSISTANT

## 2019-06-04 RX ORDER — ENALAPRIL MALEATE 20 MG/1
40 TABLET ORAL DAILY
Qty: 180 TABLET | Refills: 3 | Status: SHIPPED | OUTPATIENT
Start: 2019-06-04 | End: 2020-02-26 | Stop reason: SDUPTHER

## 2019-06-04 RX ORDER — MULTIVIT-MIN/IRON FUM/FOLIC AC 7.5 MG-4
1 TABLET ORAL DAILY
Qty: 90 TABLET | Refills: 3 | Status: SHIPPED | OUTPATIENT
Start: 2019-06-04 | End: 2020-04-30 | Stop reason: SDUPTHER

## 2019-06-04 RX ORDER — HYDROCHLOROTHIAZIDE 25 MG/1
25 TABLET ORAL DAILY
Qty: 90 TABLET | Refills: 3 | Status: SHIPPED | OUTPATIENT
Start: 2019-06-04 | End: 2020-02-26 | Stop reason: SDUPTHER

## 2020-02-24 ENCOUNTER — TELEPHONE (OUTPATIENT)
Dept: FAMILY MEDICINE CLINIC | Facility: CLINIC | Age: 70
End: 2020-02-24

## 2020-02-25 DIAGNOSIS — Z12.31 ENCOUNTER FOR SCREENING MAMMOGRAM FOR MALIGNANT NEOPLASM OF BREAST: ICD-10-CM

## 2020-02-26 DIAGNOSIS — I10 ESSENTIAL HYPERTENSION: ICD-10-CM

## 2020-02-26 RX ORDER — ENALAPRIL MALEATE 20 MG/1
40 TABLET ORAL DAILY
Qty: 60 TABLET | Refills: 0 | Status: SHIPPED | OUTPATIENT
Start: 2020-02-26 | End: 2020-03-05 | Stop reason: SDUPTHER

## 2020-02-26 RX ORDER — HYDROCHLOROTHIAZIDE 25 MG/1
25 TABLET ORAL DAILY
Qty: 30 TABLET | Refills: 0 | Status: SHIPPED | OUTPATIENT
Start: 2020-02-26 | End: 2020-03-05 | Stop reason: SDUPTHER

## 2020-03-05 ENCOUNTER — OFFICE VISIT (OUTPATIENT)
Dept: FAMILY MEDICINE CLINIC | Facility: CLINIC | Age: 70
End: 2020-03-05

## 2020-03-05 VITALS
DIASTOLIC BLOOD PRESSURE: 80 MMHG | SYSTOLIC BLOOD PRESSURE: 110 MMHG | BODY MASS INDEX: 24.48 KG/M2 | HEART RATE: 80 BPM | WEIGHT: 156 LBS | TEMPERATURE: 96.8 F | HEIGHT: 67 IN | OXYGEN SATURATION: 99 % | RESPIRATION RATE: 16 BRPM

## 2020-03-05 DIAGNOSIS — Z78.0 POST-MENOPAUSAL: ICD-10-CM

## 2020-03-05 DIAGNOSIS — I10 ESSENTIAL HYPERTENSION: Primary | ICD-10-CM

## 2020-03-05 PROCEDURE — 3079F DIAST BP 80-89 MM HG: CPT | Performed by: PHYSICIAN ASSISTANT

## 2020-03-05 PROCEDURE — 3074F SYST BP LT 130 MM HG: CPT | Performed by: PHYSICIAN ASSISTANT

## 2020-03-05 PROCEDURE — 99213 OFFICE O/P EST LOW 20 MIN: CPT | Performed by: PHYSICIAN ASSISTANT

## 2020-03-05 PROCEDURE — 3008F BODY MASS INDEX DOCD: CPT | Performed by: PHYSICIAN ASSISTANT

## 2020-03-05 PROCEDURE — 1160F RVW MEDS BY RX/DR IN RCRD: CPT | Performed by: PHYSICIAN ASSISTANT

## 2020-03-05 PROCEDURE — 1036F TOBACCO NON-USER: CPT | Performed by: PHYSICIAN ASSISTANT

## 2020-03-05 RX ORDER — ENALAPRIL MALEATE 20 MG/1
40 TABLET ORAL DAILY
Qty: 180 TABLET | Refills: 1 | Status: SHIPPED | OUTPATIENT
Start: 2020-03-05 | End: 2021-03-12 | Stop reason: SDUPTHER

## 2020-03-05 RX ORDER — HYDROCHLOROTHIAZIDE 25 MG/1
25 TABLET ORAL DAILY
Qty: 90 TABLET | Refills: 1 | Status: SHIPPED | OUTPATIENT
Start: 2020-03-05 | End: 2021-03-12 | Stop reason: SDUPTHER

## 2020-03-05 NOTE — PROGRESS NOTES
Assessment/Plan:    Hypertension  Blood pressure stable  Continue with enalapril and hydrochlorothiazide  Gave script to have DEXA scan completed  Gave script to have lab work completed  Get completed at earliest convenience  Schedule follow-up in 6 months to recheck blood pressure, and have an AWV  Diagnoses and all orders for this visit:    Essential hypertension  -     CBC and differential; Future  -     Comprehensive metabolic panel; Future  -     Lipid panel; Future  -     Microalbumin / creatinine urine ratio; Future  -     TSH, 3rd generation with Free T4 reflex; Future  -     enalapril (VASOTEC) 20 mg tablet; Take 2 tablets (40 mg total) by mouth daily  -     hydrochlorothiazide (HYDRODIURIL) 25 mg tablet; Take 1 tablet (25 mg total) by mouth daily    Post-menopausal  -     DXA bone density spine hip and pelvis; Future          Subjective:      Patient ID: Hitesh Velasquez is a 78 y o  female  69-year-old female presenting for routine follow-up of hypertension  Patient is currently on enalapril 40 mg daily, and hydrochlorothiazide 25 mg daily  States she is taking medication daily  Does not routinely check her blood pressure  Denies any headaches, dizziness, vision changes, chest pain, palpitations, shortness of breath  Has no concerns about her blood pressure today  Patient is requesting a script to have DEXA scan completed  The following portions of the patient's history were reviewed and updated as appropriate:   She  has a past medical history of Arthritis, Diverticulosis of colon, bleeding disorder, and Hypertension    She   Patient Active Problem List    Diagnosis Date Noted    Chronic hepatitis C without hepatic coma ([facility] [address] ) 06/04/2019    Rectal bleeding 04/05/2017    Arthritis     Hx of bleeding disorder     Acute blood loss anemia 03/26/2017    Syncope and collapse 03/26/2017    Blood in stool 03/25/2017    Diverticulosis of colon     Hypertension     Sinus tachycardia 01/27/2017    Lower GI bleed 01/27/2017    Diarrhea 01/27/2017     She  has a past surgical history that includes Tubal ligation; Colonoscopy (N/A, 3/28/2017); pr sigmoidoscopy flx dx w/collj spec br/wa if pfrmd (N/A, 3/27/2017); Esophagogastroduodenoscopy (N/A, 3/27/2017); Colonoscopy (N/A, 3/26/2017); Colonoscopy (N/A, 1/27/2017); and Colonoscopy (N/A, 4/7/2017)  Her family history includes Cancer in her mother  She  reports that she has never smoked  She has never used smokeless tobacco  She reports that she does not drink alcohol or use drugs  Current Outpatient Medications   Medication Sig Dispense Refill    Calcium-Magnesium-Vitamin D (CALCIUM 500 PO) Take 1 tablet by mouth daily        enalapril (VASOTEC) 20 mg tablet Take 2 tablets (40 mg total) by mouth daily 180 tablet 1    FIBER PO Take 1 tablet by mouth daily        hydrochlorothiazide (HYDRODIURIL) 25 mg tablet Take 1 tablet (25 mg total) by mouth daily 90 tablet 1    Multiple Vitamins-Minerals (MULTIVITAMIN WITH MINERALS) tablet Take 1 tablet by mouth daily 90 tablet 3    ferrous gluconate (FERGON) 324 mg tablet Take 1 tablet by mouth 2 (two) times a day before meals for 30 days 60 tablet 0    potassium chloride (K-DUR,KLOR-CON) 20 mEq tablet Take 1 tablet (20 mEq total) by mouth 2 (two) times a day for 5 days 10 tablet 0     No current facility-administered medications for this visit  She has No Known Allergies       Review of Systems   Constitutional: Negative for chills, diaphoresis, fatigue and fever  Eyes: Negative for pain and visual disturbance  Respiratory: Negative for cough, chest tightness and shortness of breath  Cardiovascular: Negative for chest pain, palpitations and leg swelling  Gastrointestinal: Negative for abdominal pain, diarrhea, nausea and vomiting  Genitourinary: Negative for dysuria and hematuria  Musculoskeletal: Negative for arthralgias, back pain and joint swelling     Neurological: Negative for dizziness, syncope, speech difficulty, weakness, numbness and headaches  Objective:      /80 (BP Location: Left arm, Patient Position: Sitting, Cuff Size: Standard)   Pulse 80   Temp (!) 96 8 °F (36 °C) (Temporal)   Resp 16   Ht 5' 7" (1 702 m)   Wt 70 8 kg (156 lb)   SpO2 99%   Breastfeeding No   BMI 24 43 kg/m²          Physical Exam   Constitutional: She is oriented to person, place, and time  She appears well-developed and well-nourished  No distress  Neck: Normal range of motion  Neck supple  No JVD present  No thyromegaly present  Cardiovascular: Normal rate, regular rhythm and normal heart sounds  Exam reveals no gallop and no friction rub  No murmur heard  Pulmonary/Chest: Effort normal and breath sounds normal  No stridor  No respiratory distress  She has no wheezes  Abdominal: Soft  Bowel sounds are normal  She exhibits no distension  There is no tenderness  There is no guarding  Musculoskeletal: Normal range of motion  She exhibits no edema  Lymphadenopathy:     She has no cervical adenopathy  Neurological: She is alert and oriented to person, place, and time  She displays normal reflexes  No cranial nerve deficit  She exhibits normal muscle tone  Coordination normal    Skin: She is not diaphoretic  Psychiatric: She has a normal mood and affect  Her behavior is normal    Nursing note and vitals reviewed

## 2020-04-30 DIAGNOSIS — R25.2 MUSCLE CRAMP: ICD-10-CM

## 2020-04-30 RX ORDER — MULTIVIT-MIN/IRON FUM/FOLIC AC 7.5 MG-4
1 TABLET ORAL DAILY
Qty: 90 TABLET | Refills: 1 | Status: SHIPPED | OUTPATIENT
Start: 2020-04-30 | End: 2022-03-31 | Stop reason: SDUPTHER

## 2020-05-29 ENCOUNTER — APPOINTMENT (OUTPATIENT)
Dept: LAB | Facility: HOSPITAL | Age: 70
End: 2020-05-29
Payer: MEDICARE

## 2020-05-29 DIAGNOSIS — R25.2 MUSCLE CRAMP: ICD-10-CM

## 2020-05-29 DIAGNOSIS — I10 ESSENTIAL HYPERTENSION: ICD-10-CM

## 2020-05-29 DIAGNOSIS — E87.6 HYPOKALEMIA: ICD-10-CM

## 2020-05-29 LAB
ALBUMIN SERPL BCP-MCNC: 4 G/DL (ref 3.5–5)
ALP SERPL-CCNC: 112 U/L (ref 46–116)
ALT SERPL W P-5'-P-CCNC: 26 U/L (ref 12–78)
ANION GAP SERPL CALCULATED.3IONS-SCNC: 7 MMOL/L (ref 4–13)
AST SERPL W P-5'-P-CCNC: 17 U/L (ref 5–45)
BASOPHILS # BLD AUTO: 0.06 THOUSANDS/ΜL (ref 0–0.1)
BASOPHILS NFR BLD AUTO: 1 % (ref 0–1)
BILIRUB SERPL-MCNC: 0.31 MG/DL (ref 0.2–1)
BUN SERPL-MCNC: 13 MG/DL (ref 5–25)
CALCIUM SERPL-MCNC: 9.5 MG/DL (ref 8.3–10.1)
CHLORIDE SERPL-SCNC: 104 MMOL/L (ref 100–108)
CHOLEST SERPL-MCNC: 214 MG/DL (ref 50–200)
CO2 SERPL-SCNC: 32 MMOL/L (ref 21–32)
CREAT SERPL-MCNC: 1 MG/DL (ref 0.6–1.3)
EOSINOPHIL # BLD AUTO: 0.16 THOUSAND/ΜL (ref 0–0.61)
EOSINOPHIL NFR BLD AUTO: 2 % (ref 0–6)
ERYTHROCYTE [DISTWIDTH] IN BLOOD BY AUTOMATED COUNT: 12.6 % (ref 11.6–15.1)
GFR SERPL CREATININE-BSD FRML MDRD: 57 ML/MIN/1.73SQ M
GLUCOSE SERPL-MCNC: 139 MG/DL (ref 65–140)
HCT VFR BLD AUTO: 44.8 % (ref 34.8–46.1)
HDLC SERPL-MCNC: 55 MG/DL
HGB BLD-MCNC: 14.4 G/DL (ref 11.5–15.4)
IMM GRANULOCYTES # BLD AUTO: 0.04 THOUSAND/UL (ref 0–0.2)
IMM GRANULOCYTES NFR BLD AUTO: 0 % (ref 0–2)
LDLC SERPL CALC-MCNC: 130 MG/DL (ref 0–100)
LYMPHOCYTES # BLD AUTO: 2.83 THOUSANDS/ΜL (ref 0.6–4.47)
LYMPHOCYTES NFR BLD AUTO: 30 % (ref 14–44)
MAGNESIUM SERPL-MCNC: 2.3 MG/DL (ref 1.6–2.6)
MCH RBC QN AUTO: 32.3 PG (ref 26.8–34.3)
MCHC RBC AUTO-ENTMCNC: 32.1 G/DL (ref 31.4–37.4)
MCV RBC AUTO: 100 FL (ref 82–98)
MONOCYTES # BLD AUTO: 0.73 THOUSAND/ΜL (ref 0.17–1.22)
MONOCYTES NFR BLD AUTO: 8 % (ref 4–12)
NEUTROPHILS # BLD AUTO: 5.55 THOUSANDS/ΜL (ref 1.85–7.62)
NEUTS SEG NFR BLD AUTO: 59 % (ref 43–75)
NONHDLC SERPL-MCNC: 159 MG/DL
NRBC BLD AUTO-RTO: 0 /100 WBCS
PLATELET # BLD AUTO: 262 THOUSANDS/UL (ref 149–390)
PMV BLD AUTO: 11.3 FL (ref 8.9–12.7)
POTASSIUM SERPL-SCNC: 3.2 MMOL/L (ref 3.5–5.3)
PROT SERPL-MCNC: 8.6 G/DL (ref 6.4–8.2)
RBC # BLD AUTO: 4.46 MILLION/UL (ref 3.81–5.12)
SODIUM SERPL-SCNC: 143 MMOL/L (ref 136–145)
T4 FREE SERPL-MCNC: 0.99 NG/DL (ref 0.76–1.46)
TRIGL SERPL-MCNC: 146 MG/DL
TSH SERPL DL<=0.05 MIU/L-ACNC: 1.13 UIU/ML (ref 0.36–3.74)
WBC # BLD AUTO: 9.37 THOUSAND/UL (ref 4.31–10.16)

## 2020-05-29 PROCEDURE — 84439 ASSAY OF FREE THYROXINE: CPT

## 2020-05-29 PROCEDURE — 85025 COMPLETE CBC W/AUTO DIFF WBC: CPT

## 2020-05-29 PROCEDURE — 80061 LIPID PANEL: CPT

## 2020-05-29 PROCEDURE — 36415 COLL VENOUS BLD VENIPUNCTURE: CPT

## 2020-05-29 PROCEDURE — 80053 COMPREHEN METABOLIC PANEL: CPT

## 2020-05-29 PROCEDURE — 83735 ASSAY OF MAGNESIUM: CPT

## 2020-05-29 PROCEDURE — 84443 ASSAY THYROID STIM HORMONE: CPT

## 2020-06-01 DIAGNOSIS — E87.6 HYPOKALEMIA: ICD-10-CM

## 2020-06-01 DIAGNOSIS — E78.5 HYPERLIPIDEMIA, UNSPECIFIED HYPERLIPIDEMIA TYPE: Primary | ICD-10-CM

## 2020-06-01 RX ORDER — POTASSIUM CHLORIDE 20 MEQ/1
20 TABLET, EXTENDED RELEASE ORAL DAILY
Qty: 5 TABLET | Refills: 0 | Status: SHIPPED | OUTPATIENT
Start: 2020-06-01 | End: 2021-11-02

## 2020-06-01 RX ORDER — ATORVASTATIN CALCIUM 10 MG/1
10 TABLET, FILM COATED ORAL DAILY
Qty: 90 TABLET | Refills: 1 | Status: SHIPPED | OUTPATIENT
Start: 2020-06-01 | End: 2021-03-12 | Stop reason: SDUPTHER

## 2020-06-05 ENCOUNTER — HOSPITAL ENCOUNTER (OUTPATIENT)
Dept: BONE DENSITY | Facility: CLINIC | Age: 70
Discharge: HOME | End: 2020-06-05
Payer: MEDICARE

## 2020-06-05 DIAGNOSIS — Z78.0 POST-MENOPAUSAL: ICD-10-CM

## 2020-06-05 PROCEDURE — 77080 DXA BONE DENSITY AXIAL: CPT

## 2020-06-08 ENCOUNTER — APPOINTMENT (OUTPATIENT)
Dept: LAB | Facility: HOSPITAL | Age: 70
End: 2020-06-08
Payer: MEDICARE

## 2020-06-08 ENCOUNTER — TRANSCRIBE ORDERS (OUTPATIENT)
Dept: LAB | Facility: HOSPITAL | Age: 70
End: 2020-06-08

## 2020-06-08 DIAGNOSIS — I10 ESSENTIAL HYPERTENSION, MALIGNANT: Primary | ICD-10-CM

## 2020-06-08 LAB
CREAT UR-MCNC: 150 MG/DL
MICROALBUMIN UR-MCNC: 42.6 MG/L (ref 0–20)
MICROALBUMIN/CREAT 24H UR: 28 MG/G CREATININE (ref 0–30)

## 2020-06-08 PROCEDURE — 82043 UR ALBUMIN QUANTITATIVE: CPT

## 2020-06-08 PROCEDURE — 82570 ASSAY OF URINE CREATININE: CPT

## 2020-10-20 DIAGNOSIS — R25.2 MUSCLE CRAMP: ICD-10-CM

## 2020-10-20 RX ORDER — MULTIVITAMIN WITH FOLIC ACID 400 MCG
TABLET ORAL
Qty: 90 TABLET | Refills: 1 | OUTPATIENT
Start: 2020-10-20

## 2021-02-12 DIAGNOSIS — I10 ESSENTIAL HYPERTENSION: ICD-10-CM

## 2021-02-12 RX ORDER — ENALAPRIL MALEATE 20 MG/1
TABLET ORAL
Qty: 180 TABLET | Refills: 1 | OUTPATIENT
Start: 2021-02-12

## 2021-02-12 RX ORDER — HYDROCHLOROTHIAZIDE 25 MG/1
TABLET ORAL
Qty: 90 TABLET | Refills: 1 | OUTPATIENT
Start: 2021-02-12

## 2021-03-12 ENCOUNTER — OFFICE VISIT (OUTPATIENT)
Dept: FAMILY MEDICINE CLINIC | Facility: CLINIC | Age: 71
End: 2021-03-12

## 2021-03-12 VITALS
HEIGHT: 67 IN | BODY MASS INDEX: 24.37 KG/M2 | HEART RATE: 73 BPM | WEIGHT: 155.3 LBS | TEMPERATURE: 97.3 F | DIASTOLIC BLOOD PRESSURE: 80 MMHG | SYSTOLIC BLOOD PRESSURE: 136 MMHG | RESPIRATION RATE: 20 BRPM | OXYGEN SATURATION: 99 %

## 2021-03-12 DIAGNOSIS — E55.9 VITAMIN D DEFICIENCY: Primary | ICD-10-CM

## 2021-03-12 DIAGNOSIS — E78.5 HYPERLIPIDEMIA, UNSPECIFIED HYPERLIPIDEMIA TYPE: ICD-10-CM

## 2021-03-12 DIAGNOSIS — I10 ESSENTIAL HYPERTENSION: ICD-10-CM

## 2021-03-12 PROCEDURE — 99214 OFFICE O/P EST MOD 30 MIN: CPT | Performed by: NURSE PRACTITIONER

## 2021-03-12 RX ORDER — ATORVASTATIN CALCIUM 10 MG/1
10 TABLET, FILM COATED ORAL DAILY
Qty: 90 TABLET | Refills: 1 | Status: SHIPPED | OUTPATIENT
Start: 2021-03-12 | End: 2021-06-14

## 2021-03-12 RX ORDER — HYDROCHLOROTHIAZIDE 25 MG/1
25 TABLET ORAL DAILY
Qty: 90 TABLET | Refills: 1 | Status: SHIPPED | OUTPATIENT
Start: 2021-03-12 | End: 2021-06-14 | Stop reason: SDUPTHER

## 2021-03-12 RX ORDER — ENALAPRIL MALEATE 20 MG/1
40 TABLET ORAL DAILY
Qty: 180 TABLET | Refills: 1 | Status: SHIPPED | OUTPATIENT
Start: 2021-03-12 | End: 2021-09-07

## 2021-03-12 NOTE — PROGRESS NOTES
Assessment/Plan:    Hypertension  Blood pressure is at goal today in the office at 136/80  Continue with current regimen: hctz 25 mg daily, enalapril 40 mg daily     Vitamin D deficiency  Check vitamin D level     Hyperlipidemia  Continue with atorvastatin 10 mg daily  Check lipid panel       Arthritis  Stable  Continue with conservative measures     Duarte Jennings was seen today for follow-up  Diagnoses and all orders for this visit:    Vitamin D deficiency  -     Vitamin D 25 hydroxy; Future    Hyperlipidemia, unspecified hyperlipidemia type  -     atorvastatin (LIPITOR) 10 mg tablet; Take 1 tablet (10 mg total) by mouth daily  -     Lipid panel; Future    Essential hypertension  -     enalapril (VASOTEC) 20 mg tablet; Take 2 tablets (40 mg total) by mouth daily  -     hydrochlorothiazide (HYDRODIURIL) 25 mg tablet; Take 1 tablet (25 mg total) by mouth daily  -     CBC and differential; Future  -     Comprehensive metabolic panel; Future  -     Lipid panel; Future  -     TSH, 3rd generation with Free T4 reflex; Future        Return in about 3 months (around 6/12/2021) for Recheck  Patient Instructions     [id number] option 7 to schedule COVID vaccine     Heart Healthy Diet   WHAT YOU NEED TO KNOW:   A heart healthy diet is an eating plan low in unhealthy fats and sodium (salt)  The plan is high in healthy fats and fiber  A heart healthy diet helps improve your cholesterol levels and lowers your risk for heart disease and stroke  A dietitian will teach you how to read and understand food labels  DISCHARGE INSTRUCTIONS:   Heart healthy diet guidelines to follow:   · Choose foods that contain healthy fats  ? Unsaturated fats  include monounsaturated and polyunsaturated fats  Unsaturated fat is found in foods such as soybean, canola, olive, corn, and safflower oils  It is also found in soft tub margarine that is made with liquid vegetable oil      ? Omega-3 fat  is found in certain fish, such as salmon, tuna, and trout, and in walnuts and flaxseed  Eat fish high in omega-3 fats at least 2 times a week  · Get 20 to 30 grams of fiber each day  Fruits, vegetables, whole-grain foods, and legumes (cooked beans) are good sources of fiber  · Limit or do not have unhealthy fats  ? Cholesterol  is found in animal foods, such as eggs and lobster, and in dairy products made from whole milk  Limit cholesterol to less than 200 mg each day  ? Saturated fat  is found in meats, such as baker and hamburger  It is also found in chicken or turkey skin, whole milk, and butter  Limit saturated fat to less than 7% of your total daily calories  ? Trans fat  is found in packaged foods, such as potato chips and cookies  It is also in hard margarine, some fried foods, and shortening  Do not eat foods that contain trans fats  · Limit sodium as directed  You may be told to limit sodium to 2,000 to 2,300 mg each day  Choose low-sodium or no-salt-added foods  Add little or no salt to food you prepare  Use herbs and spices in place of salt  Include the following in your heart healthy plan:  Ask your dietitian or healthcare provider how many servings to have from each of the following food groups:  · Grains:      ? Whole-wheat breads, cereals, and pastas, and brown rice    ? Low-fat, low-sodium crackers and chips    · Vegetables:      ? Broccoli, green beans, green peas, and spinach    ? Collards, kale, and lima beans    ? Carrots, sweet potatoes, tomatoes, and peppers    ? Canned vegetables with no salt added    · Fruits:      ? Bananas, peaches, pears, and pineapple    ? Grapes, raisins, and dates    ? Oranges, tangerines, grapefruit, orange juice, and grapefruit juice    ? Apricots, mangoes, melons, and papaya    ? Raspberries and strawberries    ? Canned fruit with no added sugar    · Low-fat dairy:      ? Nonfat (skim) milk, 1% milk, and low-fat almond, cashew, or soy milks fortified with calcium    ?  Low-fat cheese, regular or frozen yogurt, and cottage cheese    · Meats and proteins:      ? Lean cuts of beef and pork (loin, leg, round), skinless chicken and turkey    ? Legumes, soy products, egg whites, or nuts    Limit or do not include the following in your heart healthy plan:   · Unhealthy fats and oils:      ? Whole or 2% milk, cream cheese, sour cream, or cheese    ? High-fat cuts of beef (T-bone steaks, ribs), chicken or turkey with skin, and organ meats such as liver    ? Butter, stick margarine, shortening, and cooking oils such as coconut or palm oil    · Foods and liquids high in sodium:      ? Packaged foods, such as frozen dinners, cookies, macaroni and cheese, and cereals with more than 300 mg of sodium per serving    ? Vegetables with added sodium, such as instant potatoes, vegetables with added sauces, or regular canned vegetables    ? Cured or smoked meats, such as hot dogs, baker, and sausage    ? High-sodium ketchup, barbecue sauce, salad dressing, pickles, olives, soy sauce, or miso    · Foods and liquids high in sugar:      ? Candy, cake, cookies, pies, or doughnuts    ? Soft drinks (soda), sports drinks, or sweetened tea    ? Canned or dry mixes for cakes, soups, sauces, or gravies    Other healthy heart guidelines:   · Do not smoke  Nicotine and other chemicals in cigarettes and cigars can cause lung and heart damage  Ask your healthcare provider for information if you currently smoke and need help to quit  E-cigarettes or smokeless tobacco still contain nicotine  Talk to your healthcare provider before you use these products  · Limit or do not drink alcohol as directed  Alcohol can damage your heart and raise your blood pressure  Your healthcare provider may give you specific daily and weekly limits  The general recommended limit is 1 drink a day for women 21 or older and for men 72 or older  Do not have more than 3 drinks in a day or 7 in a week   The recommended limit is 2 drinks a day for men 24to 59years of age  Do not have more than 4 drinks in a day or 14 in a week  A drink of alcohol is 12 ounces of beer, 5 ounces of wine, or 1½ ounces of liquor  · Exercise regularly  Exercise can help you maintain a healthy weight and improve your blood pressure and cholesterol levels  Regular exercise can also decrease your risk for heart problems  Ask your healthcare provider about the best exercise plan for you  Do not start an exercise program without asking your healthcare provider  Follow up with your doctor or cardiologist as directed:  Write down your questions so you remember to ask them during your visits  © Copyright [address] Information is for End User's use only and may not be sold, redistributed or otherwise used for commercial purposes  All illustrations and images included in CareNotes® are the copyrighted property of A D A M , Inc  or [facility] Daniella Perales   The above information is an  only  It is not intended as medical advice for individual conditions or treatments  Talk to your doctor, nurse or pharmacist before following any medical regimen to see if it is safe and effective for you  Subjective:     Darlyn Guzman is a 69 y o  female who  has a past medical history of Arthritis, Diverticulosis of colon, bleeding disorder, and Hypertension  She also has no past medical history of Mental disorder or Substance abuse ([facility] [address] )  who presented to the office today for follow up of chronic conditions  She has no new issues or concerns  Noted that she was seen in the ED one week ago for shortness of breath and tested negative for COVID  She states that this has completely resolved  She denies any shortness of breath, chest pain, palpitations, dizziness  She is UTD with mammogram, colonoscopy, and DXA scan       The following portions of the patient's history were reviewed and updated as appropriate: allergies, current medications, past family history, past medical history, past social history, past surgical history and problem list     Current Outpatient Medications on File Prior to Visit   Medication Sig Dispense Refill    Calcium-Magnesium-Vitamin D (CALCIUM 500 PO) Take 1 tablet by mouth daily        ferrous gluconate (FERGON) 324 mg tablet Take 1 tablet by mouth 2 (two) times a day before meals for 30 days 60 tablet 0    FIBER PO Take 1 tablet by mouth daily        Multiple Vitamins-Minerals (MULTIVITAMIN WITH MINERALS) tablet Take 1 tablet by mouth daily (Patient not taking: Reported on 3/12/2021) 90 tablet 1    potassium chloride (K-DUR,KLOR-CON) 20 mEq tablet Take 1 tablet (20 mEq total) by mouth daily for 5 days 5 tablet 0    [DISCONTINUED] omeprazole (PriLOSEC) 20 mg delayed release capsule Take 20 mg by mouth daily       No current facility-administered medications on file prior to visit  Review of Systems   Constitutional: Negative for chills and fever  HENT: Negative for ear pain and sore throat  Eyes: Negative for pain and visual disturbance  Respiratory: Negative for cough and shortness of breath  Cardiovascular: Negative for chest pain and palpitations  Gastrointestinal: Negative for abdominal pain and vomiting  Genitourinary: Negative for dysuria and hematuria  Musculoskeletal: Negative for arthralgias and back pain  Skin: Negative for color change and rash  Neurological: Negative for seizures and syncope  All other systems reviewed and are negative  Objective:    /80 (BP Location: Left arm, Patient Position: Sitting, Cuff Size: Standard)   Pulse 73   Temp (!) 97 3 °F (36 3 °C) (Temporal)   Resp 20   Ht 5' 7" (1 702 m)   Wt 70 4 kg (155 lb 4 8 oz)   SpO2 99%   BMI 24 32 kg/m²     Physical Exam  Vitals signs and nursing note reviewed  Constitutional:       General: She is not in acute distress  Appearance: She is well-developed  She is not diaphoretic     HENT:      Head: Normocephalic and atraumatic  Right Ear: External ear normal       Left Ear: External ear normal    Eyes:      General:         Right eye: No discharge  Left eye: No discharge  Conjunctiva/sclera: Conjunctivae normal       Pupils: Pupils are equal, round, and reactive to light  Neck:      Musculoskeletal: Normal range of motion and neck supple  Cardiovascular:      Rate and Rhythm: Normal rate and regular rhythm  Pulses: Normal pulses  Heart sounds: Normal heart sounds  Pulmonary:      Effort: Pulmonary effort is normal  No respiratory distress  Breath sounds: Normal breath sounds  No wheezing  Abdominal:      General: Bowel sounds are normal  There is no distension  Palpations: Abdomen is soft  Tenderness: There is no abdominal tenderness  Musculoskeletal: Normal range of motion  General: No deformity  Lymphadenopathy:      Cervical: No cervical adenopathy  Skin:     General: Skin is warm and dry  Capillary Refill: Capillary refill takes less than 2 seconds  Findings: No rash  Neurological:      Mental Status: She is alert and oriented to person, place, and time  Sensory: No sensory deficit        Coordination: Coordination normal       Deep Tendon Reflexes: Reflexes normal    Psychiatric:         Behavior: Behavior normal          MARISSA Brian  03/13/21  12:18 PM

## 2021-03-12 NOTE — PATIENT INSTRUCTIONS
[id number] option 7 to schedule COVID vaccine     Heart Healthy Diet   WHAT YOU NEED TO KNOW:   A heart healthy diet is an eating plan low in unhealthy fats and sodium (salt)  The plan is high in healthy fats and fiber  A heart healthy diet helps improve your cholesterol levels and lowers your risk for heart disease and stroke  A dietitian will teach you how to read and understand food labels  DISCHARGE INSTRUCTIONS:   Heart healthy diet guidelines to follow:   · Choose foods that contain healthy fats  ? Unsaturated fats  include monounsaturated and polyunsaturated fats  Unsaturated fat is found in foods such as soybean, canola, olive, corn, and safflower oils  It is also found in soft tub margarine that is made with liquid vegetable oil  ? Omega-3 fat  is found in certain fish, such as salmon, tuna, and trout, and in walnuts and flaxseed  Eat fish high in omega-3 fats at least 2 times a week  · Get 20 to 30 grams of fiber each day  Fruits, vegetables, whole-grain foods, and legumes (cooked beans) are good sources of fiber  · Limit or do not have unhealthy fats  ? Cholesterol  is found in animal foods, such as eggs and lobster, and in dairy products made from whole milk  Limit cholesterol to less than 200 mg each day  ? Saturated fat  is found in meats, such as baker and hamburger  It is also found in chicken or turkey skin, whole milk, and butter  Limit saturated fat to less than 7% of your total daily calories  ? Trans fat  is found in packaged foods, such as potato chips and cookies  It is also in hard margarine, some fried foods, and shortening  Do not eat foods that contain trans fats  · Limit sodium as directed  You may be told to limit sodium to 2,000 to 2,300 mg each day  Choose low-sodium or no-salt-added foods  Add little or no salt to food you prepare  Use herbs and spices in place of salt         Include the following in your heart healthy plan:  Ask your dietitian or healthcare provider how many servings to have from each of the following food groups:  · Grains:      ? Whole-wheat breads, cereals, and pastas, and brown rice    ? Low-fat, low-sodium crackers and chips    · Vegetables:      ? Broccoli, green beans, green peas, and spinach    ? Collards, kale, and lima beans    ? Carrots, sweet potatoes, tomatoes, and peppers    ? Canned vegetables with no salt added    · Fruits:      ? Bananas, peaches, pears, and pineapple    ? Grapes, raisins, and dates    ? Oranges, tangerines, grapefruit, orange juice, and grapefruit juice    ? Apricots, mangoes, melons, and papaya    ? Raspberries and strawberries    ? Canned fruit with no added sugar    · Low-fat dairy:      ? Nonfat (skim) milk, 1% milk, and low-fat almond, cashew, or soy milks fortified with calcium    ? Low-fat cheese, regular or frozen yogurt, and cottage cheese    · Meats and proteins:      ? Lean cuts of beef and pork (loin, leg, round), skinless chicken and turkey    ? Legumes, soy products, egg whites, or nuts    Limit or do not include the following in your heart healthy plan:   · Unhealthy fats and oils:      ? Whole or 2% milk, cream cheese, sour cream, or cheese    ? High-fat cuts of beef (T-bone steaks, ribs), chicken or turkey with skin, and organ meats such as liver    ? Butter, stick margarine, shortening, and cooking oils such as coconut or palm oil    · Foods and liquids high in sodium:      ? Packaged foods, such as frozen dinners, cookies, macaroni and cheese, and cereals with more than 300 mg of sodium per serving    ? Vegetables with added sodium, such as instant potatoes, vegetables with added sauces, or regular canned vegetables    ? Cured or smoked meats, such as hot dogs, baker, and sausage    ? High-sodium ketchup, barbecue sauce, salad dressing, pickles, olives, soy sauce, or miso    · Foods and liquids high in sugar:      ? Candy, cake, cookies, pies, or doughnuts    ?  Soft drinks (soda), sports drinks, or sweetened tea    ? Canned or dry mixes for cakes, soups, sauces, or gravies    Other healthy heart guidelines:   · Do not smoke  Nicotine and other chemicals in cigarettes and cigars can cause lung and heart damage  Ask your healthcare provider for information if you currently smoke and need help to quit  E-cigarettes or smokeless tobacco still contain nicotine  Talk to your healthcare provider before you use these products  · Limit or do not drink alcohol as directed  Alcohol can damage your heart and raise your blood pressure  Your healthcare provider may give you specific daily and weekly limits  The general recommended limit is 1 drink a day for women 21 or older and for men 72 or older  Do not have more than 3 drinks in a day or 7 in a week  The recommended limit is 2 drinks a day for men 24to 59years of age  Do not have more than 4 drinks in a day or 14 in a week  A drink of alcohol is 12 ounces of beer, 5 ounces of wine, or 1½ ounces of liquor  · Exercise regularly  Exercise can help you maintain a healthy weight and improve your blood pressure and cholesterol levels  Regular exercise can also decrease your risk for heart problems  Ask your healthcare provider about the best exercise plan for you  Do not start an exercise program without asking your healthcare provider  Follow up with your doctor or cardiologist as directed:  Write down your questions so you remember to ask them during your visits  © Copyright [address] Information is for End User's use only and may not be sold, redistributed or otherwise used for commercial purposes  All illustrations and images included in CareNotes® are the copyrighted property of A D A M , Inc  or [address] [place], [place] [address]   The above information is an  only  It is not intended as medical advice for individual conditions or treatments   Talk to your doctor, nurse or pharmacist before following any medical regimen to see if it is safe and effective for you

## 2021-03-13 PROBLEM — E78.5 HYPERLIPIDEMIA: Status: ACTIVE | Noted: 2021-03-13

## 2021-03-13 PROBLEM — E55.9 VITAMIN D DEFICIENCY: Status: ACTIVE | Noted: 2021-03-13

## 2021-03-13 NOTE — ASSESSMENT & PLAN NOTE
Blood pressure is at goal today in the office at 136/80  Continue with current regimen: hctz 25 mg daily, enalapril 40 mg daily

## 2021-06-11 ENCOUNTER — APPOINTMENT (OUTPATIENT)
Dept: LAB | Facility: CLINIC | Age: 71
End: 2021-06-11
Payer: MEDICARE

## 2021-06-11 DIAGNOSIS — I10 ESSENTIAL HYPERTENSION: ICD-10-CM

## 2021-06-11 DIAGNOSIS — E55.9 VITAMIN D DEFICIENCY: ICD-10-CM

## 2021-06-11 DIAGNOSIS — E78.5 HYPERLIPIDEMIA, UNSPECIFIED HYPERLIPIDEMIA TYPE: ICD-10-CM

## 2021-06-11 LAB
25(OH)D3 SERPL-MCNC: 39.4 NG/ML (ref 30–100)
ALBUMIN SERPL BCP-MCNC: 4.2 G/DL (ref 3.5–5)
ALP SERPL-CCNC: 108 U/L (ref 46–116)
ALT SERPL W P-5'-P-CCNC: 18 U/L (ref 12–78)
ANION GAP SERPL CALCULATED.3IONS-SCNC: 5 MMOL/L (ref 4–13)
AST SERPL W P-5'-P-CCNC: 11 U/L (ref 5–45)
BASOPHILS # BLD AUTO: 0.05 THOUSANDS/ΜL (ref 0–0.1)
BASOPHILS NFR BLD AUTO: 1 % (ref 0–1)
BILIRUB SERPL-MCNC: 0.35 MG/DL (ref 0.2–1)
BUN SERPL-MCNC: 15 MG/DL (ref 5–25)
CALCIUM SERPL-MCNC: 9.5 MG/DL (ref 8.3–10.1)
CHLORIDE SERPL-SCNC: 107 MMOL/L (ref 100–108)
CHOLEST SERPL-MCNC: 201 MG/DL (ref 50–200)
CO2 SERPL-SCNC: 28 MMOL/L (ref 21–32)
CREAT SERPL-MCNC: 0.84 MG/DL (ref 0.6–1.3)
EOSINOPHIL # BLD AUTO: 0.14 THOUSAND/ΜL (ref 0–0.61)
EOSINOPHIL NFR BLD AUTO: 2 % (ref 0–6)
ERYTHROCYTE [DISTWIDTH] IN BLOOD BY AUTOMATED COUNT: 12.7 % (ref 11.6–15.1)
GFR SERPL CREATININE-BSD FRML MDRD: 70 ML/MIN/1.73SQ M
GLUCOSE P FAST SERPL-MCNC: 101 MG/DL (ref 65–99)
HCT VFR BLD AUTO: 43.6 % (ref 34.8–46.1)
HDLC SERPL-MCNC: 53 MG/DL
HGB BLD-MCNC: 14.1 G/DL (ref 11.5–15.4)
IMM GRANULOCYTES # BLD AUTO: 0.01 THOUSAND/UL (ref 0–0.2)
IMM GRANULOCYTES NFR BLD AUTO: 0 % (ref 0–2)
LDLC SERPL CALC-MCNC: 125 MG/DL (ref 0–100)
LYMPHOCYTES # BLD AUTO: 1.9 THOUSANDS/ΜL (ref 0.6–4.47)
LYMPHOCYTES NFR BLD AUTO: 23 % (ref 14–44)
MCH RBC QN AUTO: 32.2 PG (ref 26.8–34.3)
MCHC RBC AUTO-ENTMCNC: 32.3 G/DL (ref 31.4–37.4)
MCV RBC AUTO: 100 FL (ref 82–98)
MONOCYTES # BLD AUTO: 0.6 THOUSAND/ΜL (ref 0.17–1.22)
MONOCYTES NFR BLD AUTO: 7 % (ref 4–12)
NEUTROPHILS # BLD AUTO: 5.44 THOUSANDS/ΜL (ref 1.85–7.62)
NEUTS SEG NFR BLD AUTO: 67 % (ref 43–75)
NONHDLC SERPL-MCNC: 148 MG/DL
NRBC BLD AUTO-RTO: 0 /100 WBCS
PLATELET # BLD AUTO: 257 THOUSANDS/UL (ref 149–390)
PMV BLD AUTO: 11.5 FL (ref 8.9–12.7)
POTASSIUM SERPL-SCNC: 3.6 MMOL/L (ref 3.5–5.3)
PROT SERPL-MCNC: 8.6 G/DL (ref 6.4–8.2)
RBC # BLD AUTO: 4.38 MILLION/UL (ref 3.81–5.12)
SODIUM SERPL-SCNC: 140 MMOL/L (ref 136–145)
TRIGL SERPL-MCNC: 115 MG/DL
TSH SERPL DL<=0.05 MIU/L-ACNC: 1.2 UIU/ML (ref 0.36–3.74)
WBC # BLD AUTO: 8.14 THOUSAND/UL (ref 4.31–10.16)

## 2021-06-11 PROCEDURE — 80061 LIPID PANEL: CPT

## 2021-06-11 PROCEDURE — 82306 VITAMIN D 25 HYDROXY: CPT

## 2021-06-11 PROCEDURE — 36415 COLL VENOUS BLD VENIPUNCTURE: CPT

## 2021-06-11 PROCEDURE — 84443 ASSAY THYROID STIM HORMONE: CPT

## 2021-06-11 PROCEDURE — 85025 COMPLETE CBC W/AUTO DIFF WBC: CPT

## 2021-06-11 PROCEDURE — 80053 COMPREHEN METABOLIC PANEL: CPT

## 2021-06-14 ENCOUNTER — OFFICE VISIT (OUTPATIENT)
Dept: FAMILY MEDICINE CLINIC | Facility: CLINIC | Age: 71
End: 2021-06-14

## 2021-06-14 VITALS
SYSTOLIC BLOOD PRESSURE: 132 MMHG | BODY MASS INDEX: 24.55 KG/M2 | HEIGHT: 67 IN | WEIGHT: 156.4 LBS | DIASTOLIC BLOOD PRESSURE: 88 MMHG | OXYGEN SATURATION: 98 % | TEMPERATURE: 97.2 F | HEART RATE: 93 BPM | RESPIRATION RATE: 20 BRPM

## 2021-06-14 DIAGNOSIS — R10.12 CHRONIC LUQ PAIN: Primary | ICD-10-CM

## 2021-06-14 DIAGNOSIS — I10 ESSENTIAL HYPERTENSION: ICD-10-CM

## 2021-06-14 DIAGNOSIS — G89.29 CHRONIC LUQ PAIN: Primary | ICD-10-CM

## 2021-06-14 DIAGNOSIS — E78.5 HYPERLIPIDEMIA, UNSPECIFIED HYPERLIPIDEMIA TYPE: ICD-10-CM

## 2021-06-14 PROCEDURE — 99214 OFFICE O/P EST MOD 30 MIN: CPT | Performed by: NURSE PRACTITIONER

## 2021-06-14 RX ORDER — HYDROCHLOROTHIAZIDE 25 MG/1
25 TABLET ORAL DAILY
Qty: 90 TABLET | Refills: 1 | Status: SHIPPED | OUTPATIENT
Start: 2021-06-14 | End: 2022-02-10

## 2021-06-14 RX ORDER — ATORVASTATIN CALCIUM 20 MG/1
20 TABLET, FILM COATED ORAL DAILY
Qty: 90 TABLET | Refills: 0 | Status: SHIPPED | OUTPATIENT
Start: 2021-06-14 | End: 2021-09-10

## 2021-06-14 NOTE — ASSESSMENT & PLAN NOTE
Intermittent, vague, no radiation, x several months   Not associated w/ n/v/d, constipation, hematuria, melena   Check ultrasound

## 2021-06-14 NOTE — PROGRESS NOTES
Assessment/Plan:    Hypertension  Blood pressure is at goal today in the office at 132/88  Continue with current regimen: hctz 25 mg daily, enalapril 40 mg daily     Hyperlipidemia  Recent lipid panel with total cholesterol and LDL not at goal  Also 17 4% ASCVD risk   Increase atorvastatin too 20 mg daily     Chronic LUQ pain  Intermittent, vague, no radiation, x several months   Not associated w/ n/v/d, constipation, hematuria, melena   Check ultrasound     Antoni George was seen today for follow-up  Diagnoses and all orders for this visit:    Chronic LUQ pain  -     US abdomen complete; Future    Essential hypertension  -     hydrochlorothiazide (HYDRODIURIL) 25 mg tablet; Take 1 tablet (25 mg total) by mouth daily    Hyperlipidemia, unspecified hyperlipidemia type        Return in about 3 months (around 9/14/2021) for AWV  Patient Instructions     Hypertension   AMBULATORY CARE:   Hypertension  is high blood pressure  Your blood pressure is the force of your blood moving against the walls of your arteries  Hypertension causes your blood pressure to get so high that your heart has to work much harder than normal  This can damage your heart  The cause of hypertension may not be known  This is called essential or primary hypertension  Hypertension caused by another medical condition, such as kidney disease, is called secondary hypertension  Common symptoms include the following:   · Headache     · Blurred vision     · Chest pain     · Dizziness or weakness     · Trouble breathing    · Nosebleeds    Call 911 for any of the following:   · You have chest pain  · You have any of the following signs of a heart attack:      ?  Squeezing, pressure, or pain in your chest    ? You may  also have any of the following:     § Discomfort or pain in your back, neck, jaw, stomach, or arm    § Shortness of breath    § Nausea or vomiting    § Lightheadedness or a sudden cold sweat    · You become confused or have difficulty speaking  · You suddenly feel lightheaded or have trouble breathing  Seek care immediately if:   · You have a severe headache or vision loss  · You have weakness in an arm or leg  Contact your healthcare provider if:   · You feel faint, dizzy, confused, or drowsy  · You have been taking your blood pressure medicine but your pressure is higher than your provider says it should be  · You have questions or concerns about your condition or care  What you need to know about the stages of hypertension:       · Normal blood pressure is 119/79 or lower   Your healthcare provider may only check your blood pressure each year if it stays at a normal level  · Elevated blood pressure is 120/79 to 129/79   This is sometimes called prehypertension  Your healthcare provider may suggest lifestyle changes to help lower your blood pressure to a normal level  He or she may then check it again in 3 to 6 months  · Stage 1 hypertension is 130/80  to 139/89   Your provider may recommend lifestyle changes, medication, and checks every 3 to 6 months until your blood pressure is controlled  · Stage 2 hypertension is 140/90 or higher   Your provider will recommend lifestyle changes and have you take 2 kinds of hypertension medicines  You will also need to have your blood pressure checked monthly until it is controlled  Treatment for hypertension  may include medicine to lower your blood pressure and lower your cholesterol level  A low cholesterol level helps prevent heart disease and makes it easier to control your blood pressure  Take your medicine exactly as directed  You may also need to make lifestyle changes  Manage hypertension:   · Check your blood pressure at home  Avoid smoking, caffeine, and exercise at least 30 minutes before checking your blood pressure  Sit and rest for 5 minutes before you take your blood pressure  Extend your arm and support it on a flat surface   Your arm should be at the same level as your heart  Follow the directions that came with your blood pressure monitor  Check your blood pressure 2 times, 1 minute apart, before you take your medicine in the morning  Also check your blood pressure before your evening meal  Keep a record of your readings and bring it to your follow-up visits  Ask your healthcare provider what your blood pressure should be  · Manage any other health conditions you have  Health conditions such as diabetes can increase your risk for hypertension  Follow your healthcare provider's instructions and take all your medicines as directed  · Ask about all medicines  Certain medicines can increase your blood pressure  Examples include oral birth control pills, decongestants, herbal supplements, and NSAIDs, such as ibuprofen  Your healthcare provider can tell you which medicines are safe for you to take  This includes prescription and over-the-counter medicines  Lifestyle changes you can make to manage hypertension:   · Limit sodium (salt) as directed  Too much sodium can affect your fluid balance  Check labels to find low-sodium or no-salt-added foods  Some low-sodium foods use potassium salts for flavor  Too much potassium can also cause health problems  Your healthcare provider will tell you how much sodium and potassium are safe for you to have in a day  He or she may recommend that you limit sodium to 2,300 mg a day  · Follow the meal plan recommended by your healthcare provider  A dietitian or your provider can give you more information on low-sodium plans or the DASH (Dietary Approaches to Stop Hypertension) eating plan  The DASH plan is low in sodium, unhealthy fats, and total fat  It is high in potassium, calcium, and fiber  · Exercise to maintain a healthy weight  Exercise at least 30 minutes per day, on most days of the week  This will help decrease your blood pressure   Ask your healthcare provider about the best exercise plan for you          · Decrease stress  This may help lower your blood pressure  Learn ways to relax, such as deep breathing or listening to music  · Limit alcohol as directed  Alcohol can increase your blood pressure  A drink of alcohol is 12 ounces of beer, 5 ounces of wine, or 1½ ounces of liquor  · Do not smoke  Nicotine and other chemicals in cigarettes and cigars can increase your blood pressure and also cause lung damage  Ask your healthcare provider for information if you currently smoke and need help to quit  E-cigarettes or smokeless tobacco still contain nicotine  Talk to your healthcare provider before you use these products  Follow up with your healthcare provider as directed: You will need to return to have your blood pressure checked and to have other lab tests done  Write down your questions so you remember to ask them during your visits  © Copyright [address] Information is for End User's use only and may not be sold, redistributed or otherwise used for commercial purposes  All illustrations and images included in CareNotes® are the copyrighted property of [employer] M [employer]  or [employer]   The above information is an  only  It is not intended as medical advice for individual conditions or treatments  Talk to your doctor, nurse or pharmacist before following any medical regimen to see if it is safe and effective for you  Subjective:     Letitia Bautista is a 66 y o  female who  has a past medical history of Arthritis, Diverticulosis of colon, bleeding disorder, and Hypertension  She also has no past medical history of Mental disorder or Substance abuse ([facility] [address] )  who presented to the office today for follow up  Abdominal Pain  Patient complains of abdominal pain  The pain is described as vague, and is 4/10 in intensity  The patient is experiencing LUQ pain without radiation  Onset was a few months ago  Symptoms have been unchanged   Aggravating factors: red meat   Alleviating factors: none  Associated symptoms: none  The patient denies anorexia, arthralagias, belching, chills, constipation, diarrhea, dysuria, fever, flatus, frequency, headache, hematochezia, hematuria, melena, myalgias, nausea, sweats and vomiting  The following portions of the patient's history were reviewed and updated as appropriate: allergies, current medications, past family history, past medical history, past social history, past surgical history and problem list     Current Outpatient Medications on File Prior to Visit   Medication Sig Dispense Refill    Calcium-Magnesium-Vitamin D (CALCIUM 500 PO) Take 1 tablet by mouth daily        enalapril (VASOTEC) 20 mg tablet Take 2 tablets (40 mg total) by mouth daily 180 tablet 1    FIBER PO Take 1 tablet by mouth daily        [DISCONTINUED] hydrochlorothiazide (HYDRODIURIL) 25 mg tablet Take 1 tablet (25 mg total) by mouth daily 90 tablet 1    ferrous gluconate (FERGON) 324 mg tablet Take 1 tablet by mouth 2 (two) times a day before meals for 30 days 60 tablet 0    Multiple Vitamins-Minerals (MULTIVITAMIN WITH MINERALS) tablet Take 1 tablet by mouth daily (Patient not taking: Reported on 3/12/2021) 90 tablet 1    potassium chloride (K-DUR,KLOR-CON) 20 mEq tablet Take 1 tablet (20 mEq total) by mouth daily for 5 days 5 tablet 0    [DISCONTINUED] atorvastatin (LIPITOR) 10 mg tablet Take 1 tablet (10 mg total) by mouth daily 90 tablet 1    [DISCONTINUED] omeprazole (PriLOSEC) 20 mg delayed release capsule Take 20 mg by mouth daily       No current facility-administered medications on file prior to visit  Review of Systems   Constitutional: Negative for chills and fever  HENT: Negative for ear pain and sore throat  Eyes: Negative for pain and visual disturbance  Respiratory: Negative for cough and shortness of breath  Cardiovascular: Negative for chest pain and palpitations  Gastrointestinal: Positive for abdominal pain  Negative for vomiting  Genitourinary: Negative for dysuria and hematuria  Musculoskeletal: Negative for arthralgias and back pain  Skin: Negative for color change and rash  Neurological: Negative for dizziness, seizures and syncope  All other systems reviewed and are negative  Objective:    /88 (BP Location: Left arm, Patient Position: Sitting, Cuff Size: Standard)   Pulse 93   Temp (!) 97 2 °F (36 2 °C) (Temporal)   Resp 20   Ht 5' 7" (1 702 m)   Wt 70 9 kg (156 lb 6 4 oz)   SpO2 98%   BMI 24 50 kg/m²     Physical Exam  Vitals and nursing note reviewed  Constitutional:       General: She is not in acute distress  Appearance: She is well-developed  She is not diaphoretic  HENT:      Head: Normocephalic and atraumatic  Right Ear: External ear normal       Left Ear: External ear normal    Eyes:      Conjunctiva/sclera: Conjunctivae normal       Pupils: Pupils are equal, round, and reactive to light  Cardiovascular:      Rate and Rhythm: Normal rate and regular rhythm  Pulses: Normal pulses  Heart sounds: Normal heart sounds  Pulmonary:      Effort: Pulmonary effort is normal  No respiratory distress  Breath sounds: Normal breath sounds  No wheezing  Abdominal:      General: Bowel sounds are normal  There is no distension  Palpations: Abdomen is soft  Tenderness: There is no abdominal tenderness  There is no guarding or rebound  Musculoskeletal:         General: No deformity  Normal range of motion  Cervical back: Normal range of motion and neck supple  Right lower leg: No edema  Left lower leg: No edema  Lymphadenopathy:      Cervical: No cervical adenopathy  Skin:     General: Skin is warm and dry  Capillary Refill: Capillary refill takes less than 2 seconds  Findings: No rash  Neurological:      General: No focal deficit present  Mental Status: She is alert and oriented to person, place, and time  Psychiatric:         Mood and Affect: Mood normal          Behavior: Behavior normal          MARISSA Palumbo  06/14/21  2:44 PM

## 2021-06-14 NOTE — ASSESSMENT & PLAN NOTE
Recent lipid panel with total cholesterol and LDL not at goal  Also 17 4% ASCVD risk   Increase atorvastatin too 20 mg daily

## 2021-06-14 NOTE — PATIENT INSTRUCTIONS
Hypertension   AMBULATORY CARE:   Hypertension  is high blood pressure  Your blood pressure is the force of your blood moving against the walls of your arteries  Hypertension causes your blood pressure to get so high that your heart has to work much harder than normal  This can damage your heart  The cause of hypertension may not be known  This is called essential or primary hypertension  Hypertension caused by another medical condition, such as kidney disease, is called secondary hypertension  Common symptoms include the following:   · Headache     · Blurred vision     · Chest pain     · Dizziness or weakness     · Trouble breathing    · Nosebleeds    Call 911 for any of the following:   · You have chest pain  · You have any of the following signs of a heart attack:      ? Squeezing, pressure, or pain in your chest    ? You may  also have any of the following:     § Discomfort or pain in your back, neck, jaw, stomach, or arm    § Shortness of breath    § Nausea or vomiting    § Lightheadedness or a sudden cold sweat    · You become confused or have difficulty speaking  · You suddenly feel lightheaded or have trouble breathing  Seek care immediately if:   · You have a severe headache or vision loss  · You have weakness in an arm or leg  Contact your healthcare provider if:   · You feel faint, dizzy, confused, or drowsy  · You have been taking your blood pressure medicine but your pressure is higher than your provider says it should be  · You have questions or concerns about your condition or care  What you need to know about the stages of hypertension:       · Normal blood pressure is 119/79 or lower   Your healthcare provider may only check your blood pressure each year if it stays at a normal level  · Elevated blood pressure is 120/79 to 129/79   This is sometimes called prehypertension   Your healthcare provider may suggest lifestyle changes to help lower your blood pressure to a normal level  He or she may then check it again in 3 to 6 months  · Stage 1 hypertension is 130/80  to 139/89   Your provider may recommend lifestyle changes, medication, and checks every 3 to 6 months until your blood pressure is controlled  · Stage 2 hypertension is 140/90 or higher   Your provider will recommend lifestyle changes and have you take 2 kinds of hypertension medicines  You will also need to have your blood pressure checked monthly until it is controlled  Treatment for hypertension  may include medicine to lower your blood pressure and lower your cholesterol level  A low cholesterol level helps prevent heart disease and makes it easier to control your blood pressure  Take your medicine exactly as directed  You may also need to make lifestyle changes  Manage hypertension:   · Check your blood pressure at home  Avoid smoking, caffeine, and exercise at least 30 minutes before checking your blood pressure  Sit and rest for 5 minutes before you take your blood pressure  Extend your arm and support it on a flat surface  Your arm should be at the same level as your heart  Follow the directions that came with your blood pressure monitor  Check your blood pressure 2 times, 1 minute apart, before you take your medicine in the morning  Also check your blood pressure before your evening meal  Keep a record of your readings and bring it to your follow-up visits  Ask your healthcare provider what your blood pressure should be  · Manage any other health conditions you have  Health conditions such as diabetes can increase your risk for hypertension  Follow your healthcare provider's instructions and take all your medicines as directed  · Ask about all medicines  Certain medicines can increase your blood pressure  Examples include oral birth control pills, decongestants, herbal supplements, and NSAIDs, such as ibuprofen  Your healthcare provider can tell you which medicines are safe for you to take  This includes prescription and over-the-counter medicines  Lifestyle changes you can make to manage hypertension:   · Limit sodium (salt) as directed  Too much sodium can affect your fluid balance  Check labels to find low-sodium or no-salt-added foods  Some low-sodium foods use potassium salts for flavor  Too much potassium can also cause health problems  Your healthcare provider will tell you how much sodium and potassium are safe for you to have in a day  He or she may recommend that you limit sodium to 2,300 mg a day  · Follow the meal plan recommended by your healthcare provider  A dietitian or your provider can give you more information on low-sodium plans or the DASH (Dietary Approaches to Stop Hypertension) eating plan  The DASH plan is low in sodium, unhealthy fats, and total fat  It is high in potassium, calcium, and fiber  · Exercise to maintain a healthy weight  Exercise at least 30 minutes per day, on most days of the week  This will help decrease your blood pressure  Ask your healthcare provider about the best exercise plan for you  · Decrease stress  This may help lower your blood pressure  Learn ways to relax, such as deep breathing or listening to music  · Limit alcohol as directed  Alcohol can increase your blood pressure  A drink of alcohol is 12 ounces of beer, 5 ounces of wine, or 1½ ounces of liquor  · Do not smoke  Nicotine and other chemicals in cigarettes and cigars can increase your blood pressure and also cause lung damage  Ask your healthcare provider for information if you currently smoke and need help to quit  E-cigarettes or smokeless tobacco still contain nicotine  Talk to your healthcare provider before you use these products  Follow up with your healthcare provider as directed: You will need to return to have your blood pressure checked and to have other lab tests done   Write down your questions so you remember to ask them during your visits  © Copyright [address] [place] [address] Information is for End User's use only and may not be sold, redistributed or otherwise used for commercial purposes  All illustrations and images included in CareNotes® are the copyrighted property of [employer] M [employer] Inc  or Abhilash Gal[employer]  The above information is an  only  It is not intended as medical advice for individual conditions or treatments  Talk to your doctor, nurse or pharmacist before following any medical regimen to see if it is safe and effective for you

## 2021-06-14 NOTE — ASSESSMENT & PLAN NOTE
Blood pressure is at goal today in the office at 132/88  Continue with current regimen: hctz 25 mg daily, enalapril 40 mg daily

## 2021-07-08 ENCOUNTER — HOSPITAL ENCOUNTER (OUTPATIENT)
Dept: ULTRASOUND IMAGING | Facility: HOSPITAL | Age: 71
Discharge: HOME | End: 2021-07-08
Payer: MEDICARE

## 2021-07-08 DIAGNOSIS — G89.29 CHRONIC LUQ PAIN: ICD-10-CM

## 2021-07-08 DIAGNOSIS — R10.12 CHRONIC LUQ PAIN: ICD-10-CM

## 2021-07-08 PROCEDURE — 76700 US EXAM ABDOM COMPLETE: CPT

## 2021-09-18 ENCOUNTER — IMMUNIZATIONS (OUTPATIENT)
Dept: FAMILY MEDICINE CLINIC | Facility: MEDICAL CENTER | Age: 71
End: 2021-09-18

## 2021-09-18 DIAGNOSIS — Z23 ENCOUNTER FOR IMMUNIZATION: Primary | ICD-10-CM

## 2021-09-18 PROCEDURE — 91300 SARSCOV2 VAC 30MCG/0.3ML IM: CPT

## 2021-10-09 ENCOUNTER — IMMUNIZATIONS (OUTPATIENT)
Dept: FAMILY MEDICINE CLINIC | Facility: MEDICAL CENTER | Age: 71
End: 2021-10-09

## 2021-10-09 DIAGNOSIS — Z23 ENCOUNTER FOR IMMUNIZATION: Primary | ICD-10-CM

## 2021-10-09 PROCEDURE — 91300 SARSCOV2 VAC 30MCG/0.3ML IM: CPT

## 2021-10-10 DIAGNOSIS — E78.5 HYPERLIPIDEMIA, UNSPECIFIED HYPERLIPIDEMIA TYPE: ICD-10-CM

## 2021-10-11 RX ORDER — ATORVASTATIN CALCIUM 20 MG/1
TABLET, FILM COATED ORAL
Qty: 30 TABLET | Refills: 0 | Status: SHIPPED | OUTPATIENT
Start: 2021-10-11 | End: 2021-11-05

## 2021-10-15 ENCOUNTER — OFFICE VISIT (OUTPATIENT)
Dept: FAMILY MEDICINE CLINIC | Facility: CLINIC | Age: 71
End: 2021-10-15

## 2021-10-15 VITALS
DIASTOLIC BLOOD PRESSURE: 70 MMHG | TEMPERATURE: 97.1 F | OXYGEN SATURATION: 99 % | WEIGHT: 158 LBS | RESPIRATION RATE: 16 BRPM | HEIGHT: 67 IN | HEART RATE: 77 BPM | SYSTOLIC BLOOD PRESSURE: 122 MMHG | BODY MASS INDEX: 24.8 KG/M2

## 2021-10-15 DIAGNOSIS — Z00.00 MEDICARE ANNUAL WELLNESS VISIT, SUBSEQUENT: Primary | ICD-10-CM

## 2021-10-15 DIAGNOSIS — N95.9 UNSPECIFIED MENOPAUSAL AND PERIMENOPAUSAL DISORDER: ICD-10-CM

## 2021-10-15 DIAGNOSIS — N95.1 MENOPAUSAL STATE: ICD-10-CM

## 2021-10-15 DIAGNOSIS — I10 PRIMARY HYPERTENSION: ICD-10-CM

## 2021-10-15 DIAGNOSIS — E55.9 VITAMIN D DEFICIENCY: ICD-10-CM

## 2021-10-15 DIAGNOSIS — E78.5 HYPERLIPIDEMIA, UNSPECIFIED HYPERLIPIDEMIA TYPE: ICD-10-CM

## 2021-10-15 DIAGNOSIS — Z28.21 INFLUENZA VACCINATION DECLINED: ICD-10-CM

## 2021-10-15 DIAGNOSIS — Z13.820 SCREENING FOR OSTEOPOROSIS: ICD-10-CM

## 2021-10-15 PROBLEM — R10.12 CHRONIC LUQ PAIN: Status: RESOLVED | Noted: 2021-06-14 | Resolved: 2021-10-15

## 2021-10-15 PROBLEM — R19.7 DIARRHEA: Status: RESOLVED | Noted: 2017-01-27 | Resolved: 2021-10-15

## 2021-10-15 PROBLEM — K31.819 GAVE (GASTRIC ANTRAL VASCULAR ECTASIA): Status: ACTIVE | Noted: 2017-06-09

## 2021-10-15 PROBLEM — G89.29 CHRONIC LUQ PAIN: Status: RESOLVED | Noted: 2021-06-14 | Resolved: 2021-10-15

## 2021-10-15 PROCEDURE — G0439 PPPS, SUBSEQ VISIT: HCPCS | Performed by: NURSE PRACTITIONER

## 2021-10-15 PROCEDURE — 1123F ACP DISCUSS/DSCN MKR DOCD: CPT | Performed by: NURSE PRACTITIONER

## 2021-11-02 ENCOUNTER — HOSPITAL ENCOUNTER (EMERGENCY)
Facility: HOSPITAL | Age: 71
Discharge: HOME | End: 2021-11-02
Attending: EMERGENCY MEDICINE
Payer: MEDICARE

## 2021-11-02 ENCOUNTER — APPOINTMENT (EMERGENCY)
Dept: CT IMAGING | Facility: HOSPITAL | Age: 71
End: 2021-11-02
Payer: MEDICARE

## 2021-11-02 VITALS
HEIGHT: 67 IN | WEIGHT: 156.97 LBS | HEART RATE: 84 BPM | OXYGEN SATURATION: 97 % | SYSTOLIC BLOOD PRESSURE: 148 MMHG | BODY MASS INDEX: 24.64 KG/M2 | DIASTOLIC BLOOD PRESSURE: 86 MMHG | RESPIRATION RATE: 16 BRPM | TEMPERATURE: 98.6 F

## 2021-11-02 DIAGNOSIS — N30.90 CYSTITIS: Primary | ICD-10-CM

## 2021-11-02 LAB
ALBUMIN SERPL BCP-MCNC: 3.9 G/DL (ref 3.5–5)
ALP SERPL-CCNC: 119 U/L (ref 46–116)
ALT SERPL W P-5'-P-CCNC: 29 U/L (ref 12–78)
ANION GAP SERPL CALCULATED.3IONS-SCNC: 13 MMOL/L (ref 4–13)
AST SERPL W P-5'-P-CCNC: 43 U/L (ref 5–45)
BACTERIA UR QL AUTO: ABNORMAL /HPF
BASOPHILS # BLD AUTO: 0.05 THOUSANDS/ΜL (ref 0–0.1)
BASOPHILS NFR BLD AUTO: 0 % (ref 0–1)
BILIRUB SERPL-MCNC: 0.53 MG/DL (ref 0.2–1)
BILIRUB UR QL STRIP: NEGATIVE
BUN SERPL-MCNC: 20 MG/DL (ref 5–25)
CALCIUM SERPL-MCNC: 9.5 MG/DL (ref 8.3–10.1)
CHLORIDE SERPL-SCNC: 101 MMOL/L (ref 100–108)
CLARITY UR: CLEAR
CO2 SERPL-SCNC: 26 MMOL/L (ref 21–32)
COLOR UR: YELLOW
CREAT SERPL-MCNC: 1.05 MG/DL (ref 0.6–1.3)
EOSINOPHIL # BLD AUTO: 0.01 THOUSAND/ΜL (ref 0–0.61)
EOSINOPHIL NFR BLD AUTO: 0 % (ref 0–6)
ERYTHROCYTE [DISTWIDTH] IN BLOOD BY AUTOMATED COUNT: 12.6 % (ref 11.6–15.1)
GFR SERPL CREATININE-BSD FRML MDRD: 53 ML/MIN/1.73SQ M
GLUCOSE SERPL-MCNC: 120 MG/DL (ref 65–140)
GLUCOSE UR STRIP-MCNC: NEGATIVE MG/DL
HCT VFR BLD AUTO: 45.3 % (ref 34.8–46.1)
HGB BLD-MCNC: 14.8 G/DL (ref 11.5–15.4)
HGB UR QL STRIP.AUTO: ABNORMAL
HYALINE CASTS #/AREA URNS LPF: ABNORMAL /LPF
IMM GRANULOCYTES # BLD AUTO: 0.05 THOUSAND/UL (ref 0–0.2)
IMM GRANULOCYTES NFR BLD AUTO: 0 % (ref 0–2)
KETONES UR STRIP-MCNC: NEGATIVE MG/DL
LEUKOCYTE ESTERASE UR QL STRIP: ABNORMAL
LIPASE SERPL-CCNC: 148 U/L (ref 73–393)
LYMPHOCYTES # BLD AUTO: 2.07 THOUSANDS/ΜL (ref 0.6–4.47)
LYMPHOCYTES NFR BLD AUTO: 14 % (ref 14–44)
MCH RBC QN AUTO: 32.3 PG (ref 26.8–34.3)
MCHC RBC AUTO-ENTMCNC: 32.7 G/DL (ref 31.4–37.4)
MCV RBC AUTO: 99 FL (ref 82–98)
MONOCYTES # BLD AUTO: 0.94 THOUSAND/ΜL (ref 0.17–1.22)
MONOCYTES NFR BLD AUTO: 6 % (ref 4–12)
MUCOUS THREADS UR QL AUTO: ABNORMAL
NEUTROPHILS # BLD AUTO: 11.82 THOUSANDS/ΜL (ref 1.85–7.62)
NEUTS SEG NFR BLD AUTO: 80 % (ref 43–75)
NITRITE UR QL STRIP: NEGATIVE
NON-SQ EPI CELLS URNS QL MICRO: ABNORMAL /HPF
NRBC BLD AUTO-RTO: 0 /100 WBCS
PH UR STRIP.AUTO: 5.5 [PH] (ref 4.5–8)
PLATELET # BLD AUTO: 291 THOUSANDS/UL (ref 149–390)
PMV BLD AUTO: 10.8 FL (ref 8.9–12.7)
POTASSIUM SERPL-SCNC: 4.9 MMOL/L (ref 3.5–5.3)
PROT SERPL-MCNC: 8.3 G/DL (ref 6.4–8.2)
PROT UR STRIP-MCNC: NEGATIVE MG/DL
RBC # BLD AUTO: 4.58 MILLION/UL (ref 3.81–5.12)
RBC #/AREA URNS AUTO: ABNORMAL /HPF
SODIUM SERPL-SCNC: 140 MMOL/L (ref 136–145)
SP GR UR STRIP.AUTO: 1.02 (ref 1–1.03)
UROBILINOGEN UR QL STRIP.AUTO: 0.2 E.U./DL
WBC # BLD AUTO: 14.94 THOUSAND/UL (ref 4.31–10.16)
WBC #/AREA URNS AUTO: ABNORMAL /HPF

## 2021-11-02 PROCEDURE — 96361 HYDRATE IV INFUSION ADD-ON: CPT

## 2021-11-02 PROCEDURE — 81001 URINALYSIS AUTO W/SCOPE: CPT

## 2021-11-02 PROCEDURE — 1123F ACP DISCUSS/DSCN MKR DOCD: CPT | Performed by: EMERGENCY MEDICINE

## 2021-11-02 PROCEDURE — 83690 ASSAY OF LIPASE: CPT

## 2021-11-02 PROCEDURE — 80053 COMPREHEN METABOLIC PANEL: CPT

## 2021-11-02 PROCEDURE — 85025 COMPLETE CBC W/AUTO DIFF WBC: CPT

## 2021-11-02 PROCEDURE — 99284 EMERGENCY DEPT VISIT MOD MDM: CPT | Performed by: EMERGENCY MEDICINE

## 2021-11-02 PROCEDURE — 96360 HYDRATION IV INFUSION INIT: CPT

## 2021-11-02 PROCEDURE — 99284 EMERGENCY DEPT VISIT MOD MDM: CPT

## 2021-11-02 PROCEDURE — G1004 CDSM NDSC: HCPCS

## 2021-11-02 PROCEDURE — 74177 CT ABD & PELVIS W/CONTRAST: CPT

## 2021-11-02 PROCEDURE — 36415 COLL VENOUS BLD VENIPUNCTURE: CPT

## 2021-11-02 RX ORDER — CEPHALEXIN 500 MG/1
500 CAPSULE ORAL EVERY 12 HOURS SCHEDULED
Qty: 14 CAPSULE | Refills: 0 | Status: SHIPPED | OUTPATIENT
Start: 2021-11-02 | End: 2021-11-09

## 2021-11-02 RX ORDER — CEPHALEXIN 250 MG/1
500 CAPSULE ORAL ONCE
Status: COMPLETED | OUTPATIENT
Start: 2021-11-02 | End: 2021-11-02

## 2021-11-02 RX ADMIN — SODIUM CHLORIDE 1000 ML: 0.9 INJECTION, SOLUTION INTRAVENOUS at 12:24

## 2021-11-02 RX ADMIN — IOHEXOL 100 ML: 350 INJECTION, SOLUTION INTRAVENOUS at 14:27

## 2021-11-02 RX ADMIN — CEPHALEXIN 500 MG: 250 CAPSULE ORAL at 15:54

## 2021-11-05 DIAGNOSIS — E78.5 HYPERLIPIDEMIA, UNSPECIFIED HYPERLIPIDEMIA TYPE: ICD-10-CM

## 2021-11-05 RX ORDER — ATORVASTATIN CALCIUM 20 MG/1
TABLET, FILM COATED ORAL
Qty: 30 TABLET | Refills: 0 | Status: SHIPPED | OUTPATIENT
Start: 2021-11-05 | End: 2021-11-30

## 2021-11-30 DIAGNOSIS — E78.5 HYPERLIPIDEMIA, UNSPECIFIED HYPERLIPIDEMIA TYPE: ICD-10-CM

## 2021-11-30 DIAGNOSIS — I10 ESSENTIAL HYPERTENSION: ICD-10-CM

## 2021-11-30 RX ORDER — ENALAPRIL MALEATE 20 MG/1
TABLET ORAL
Qty: 180 TABLET | Refills: 0 | Status: SHIPPED | OUTPATIENT
Start: 2021-11-30 | End: 2022-03-01

## 2021-11-30 RX ORDER — ATORVASTATIN CALCIUM 20 MG/1
TABLET, FILM COATED ORAL
Qty: 30 TABLET | Refills: 0 | Status: SHIPPED | OUTPATIENT
Start: 2021-11-30 | End: 2021-12-28

## 2021-12-28 DIAGNOSIS — E78.5 HYPERLIPIDEMIA, UNSPECIFIED HYPERLIPIDEMIA TYPE: ICD-10-CM

## 2021-12-28 RX ORDER — ATORVASTATIN CALCIUM 20 MG/1
TABLET, FILM COATED ORAL
Qty: 30 TABLET | Refills: 0 | Status: SHIPPED | OUTPATIENT
Start: 2021-12-28 | End: 2022-01-25

## 2022-01-25 DIAGNOSIS — E78.5 HYPERLIPIDEMIA, UNSPECIFIED HYPERLIPIDEMIA TYPE: ICD-10-CM

## 2022-01-25 RX ORDER — ATORVASTATIN CALCIUM 20 MG/1
TABLET, FILM COATED ORAL
Qty: 30 TABLET | Refills: 0 | Status: SHIPPED | OUTPATIENT
Start: 2022-01-25 | End: 2022-02-18

## 2022-02-10 DIAGNOSIS — I10 ESSENTIAL HYPERTENSION: ICD-10-CM

## 2022-02-10 RX ORDER — HYDROCHLOROTHIAZIDE 25 MG/1
TABLET ORAL
Qty: 90 TABLET | Refills: 1 | Status: SHIPPED | OUTPATIENT
Start: 2022-02-10 | End: 2022-03-31 | Stop reason: SDUPTHER

## 2022-02-18 DIAGNOSIS — E78.5 HYPERLIPIDEMIA, UNSPECIFIED HYPERLIPIDEMIA TYPE: ICD-10-CM

## 2022-02-18 RX ORDER — ATORVASTATIN CALCIUM 20 MG/1
TABLET, FILM COATED ORAL
Qty: 30 TABLET | Refills: 0 | Status: SHIPPED | OUTPATIENT
Start: 2022-02-18 | End: 2022-03-21

## 2022-02-27 DIAGNOSIS — I10 ESSENTIAL HYPERTENSION: ICD-10-CM

## 2022-03-01 RX ORDER — ENALAPRIL MALEATE 20 MG/1
TABLET ORAL
Qty: 180 TABLET | Refills: 0 | Status: SHIPPED | OUTPATIENT
Start: 2022-03-01 | End: 2022-03-31 | Stop reason: SDUPTHER

## 2022-03-20 DIAGNOSIS — E78.5 HYPERLIPIDEMIA, UNSPECIFIED HYPERLIPIDEMIA TYPE: ICD-10-CM

## 2022-03-21 RX ORDER — ATORVASTATIN CALCIUM 20 MG/1
TABLET, FILM COATED ORAL
Qty: 30 TABLET | Refills: 0 | Status: SHIPPED | OUTPATIENT
Start: 2022-03-21 | End: 2022-03-31 | Stop reason: SDUPTHER

## 2022-03-31 ENCOUNTER — OFFICE VISIT (OUTPATIENT)
Dept: FAMILY MEDICINE CLINIC | Facility: CLINIC | Age: 72
End: 2022-03-31

## 2022-03-31 VITALS
HEART RATE: 77 BPM | TEMPERATURE: 98 F | WEIGHT: 161 LBS | HEIGHT: 67 IN | OXYGEN SATURATION: 98 % | RESPIRATION RATE: 16 BRPM | BODY MASS INDEX: 25.27 KG/M2 | SYSTOLIC BLOOD PRESSURE: 114 MMHG | DIASTOLIC BLOOD PRESSURE: 74 MMHG

## 2022-03-31 DIAGNOSIS — R25.2 MUSCLE CRAMP: ICD-10-CM

## 2022-03-31 DIAGNOSIS — N18.30 STAGE 3 CHRONIC KIDNEY DISEASE, UNSPECIFIED WHETHER STAGE 3A OR 3B CKD (HCC): ICD-10-CM

## 2022-03-31 DIAGNOSIS — Z86.39 PERSONAL HISTORY OF OTHER ENDOCRINE, NUTRITIONAL AND METABOLIC DISEASE: ICD-10-CM

## 2022-03-31 DIAGNOSIS — E78.5 HYPERLIPIDEMIA, UNSPECIFIED HYPERLIPIDEMIA TYPE: ICD-10-CM

## 2022-03-31 DIAGNOSIS — Z12.31 BREAST CANCER SCREENING BY MAMMOGRAM: Primary | ICD-10-CM

## 2022-03-31 DIAGNOSIS — E55.9 VITAMIN D DEFICIENCY: ICD-10-CM

## 2022-03-31 DIAGNOSIS — B18.2 CHRONIC HEPATITIS C WITHOUT HEPATIC COMA (HCC): ICD-10-CM

## 2022-03-31 DIAGNOSIS — I10 ESSENTIAL HYPERTENSION: ICD-10-CM

## 2022-03-31 DIAGNOSIS — Z86.19 HISTORY OF HEPATITIS C: ICD-10-CM

## 2022-03-31 DIAGNOSIS — I10 PRIMARY HYPERTENSION: ICD-10-CM

## 2022-03-31 PROCEDURE — 99214 OFFICE O/P EST MOD 30 MIN: CPT | Performed by: NURSE PRACTITIONER

## 2022-03-31 RX ORDER — ENALAPRIL MALEATE 20 MG/1
40 TABLET ORAL DAILY
Qty: 180 TABLET | Refills: 1 | Status: SHIPPED | OUTPATIENT
Start: 2022-03-31 | End: 2022-08-03 | Stop reason: SDUPTHER

## 2022-03-31 RX ORDER — HYDROCHLOROTHIAZIDE 25 MG/1
25 TABLET ORAL DAILY
Qty: 90 TABLET | Refills: 1 | Status: SHIPPED | OUTPATIENT
Start: 2022-03-31 | End: 2022-08-03 | Stop reason: SDUPTHER

## 2022-03-31 RX ORDER — MULTIVIT-MIN/IRON FUM/FOLIC AC 7.5 MG-4
1 TABLET ORAL DAILY
Qty: 90 TABLET | Refills: 1 | Status: SHIPPED | OUTPATIENT
Start: 2022-03-31 | End: 2022-08-03 | Stop reason: SDUPTHER

## 2022-03-31 RX ORDER — ATORVASTATIN CALCIUM 20 MG/1
20 TABLET, FILM COATED ORAL DAILY
Qty: 90 TABLET | Refills: 1 | Status: SHIPPED | OUTPATIENT
Start: 2022-03-31 | End: 2022-08-03 | Stop reason: SDUPTHER

## 2022-03-31 NOTE — ASSESSMENT & PLAN NOTE
Lab Results   Component Value Date    CHOLESTEROL 201 (H) 06/11/2021    CHOLESTEROL 214 (H) 05/29/2020    CHOLESTEROL 165 07/05/2017     Lab Results   Component Value Date    HDL 53 06/11/2021    HDL 55 05/29/2020    HDL 70 (H) 07/05/2017     Lab Results   Component Value Date    TRIG 115 06/11/2021    TRIG 146 05/29/2020    TRIG 103 07/05/2017     Lab Results   Component Value Date    NONHDLC 148 06/11/2021    [place] 159 05/29/2020     Lab Results   Component Value Date    LDLCALC 125 (H) 06/11/2021     Continue with atorvastatin 20 mg daily   Repeat lipid panel

## 2022-03-31 NOTE — ASSESSMENT & PLAN NOTE
Blood pressure is at goal today in the office at \[facility]\"" with current regimen: hctz 25 mg daily, enalapril 40 mg daily

## 2022-03-31 NOTE — PATIENT INSTRUCTIONS
Hypertension   AMBULATORY CARE:   Hypertension  is high blood pressure  Your blood pressure is the force of your blood moving against the walls of your arteries  Hypertension causes your blood pressure to get so high that your heart has to work much harder than normal  This can damage your heart  The cause of hypertension may not be known  This is called essential or primary hypertension  Hypertension caused by another medical condition, such as kidney disease, is called secondary hypertension  Common symptoms include the following:   · Headache    · Blurred vision    · Chest pain    · Dizziness or weakness    · Trouble breathing    · Nosebleeds    Call your local emergency number (911 in the [address] [facility],[address]) or have someone call if:   · You have chest pain  · You have any of the following signs of a heart attack:      ? Squeezing, pressure, or pain in your chest    ? You may  also have any of the following:     § Discomfort or pain in your back, neck, jaw, stomach, or arm    § Shortness of breath    § Nausea or vomiting    § Lightheadedness or a sudden cold sweat    · You become confused or have trouble speaking  · You suddenly feel lightheaded or have trouble breathing  Seek care immediately if:   · You have a severe headache or vision loss  · You have weakness in an arm or leg  Call your doctor or cardiologist if:   · You feel faint, dizzy, confused, or drowsy  · You have been taking your blood pressure medicine but your pressure is higher than your provider says it should be  · You have questions or concerns about your condition or care  What you need to know about the stages of hypertension:       · Normal blood pressure is 119/79 or lower   Your healthcare provider may only check your blood pressure each year if it stays at a normal level  · Elevated blood pressure is 120/79 to 129/79   This is sometimes called prehypertension   Your healthcare provider may suggest lifestyle changes to help lower your blood pressure to a normal level  He or she may then check it again in 3 to 6 months  · Stage 1 hypertension is 130/80  to 139/89   Your provider may recommend lifestyle changes, medication, and checks every 3 to 6 months until your blood pressure is controlled  · Stage 2 hypertension is 140/90 or higher   Your provider will recommend lifestyle changes and have you take 2 kinds of hypertension medicines  You will also need to have your blood pressure checked monthly until it is controlled  Treatment for hypertension  may include medicine to lower your blood pressure and lower your cholesterol level  A low cholesterol level helps prevent heart disease and makes it easier to control your blood pressure  Take your medicine exactly as directed  You may also need to make lifestyle changes  Manage hypertension:   · Check your blood pressure at home  Avoid smoking, caffeine, and exercise at least 30 minutes before checking your blood pressure  Sit and rest for 5 minutes before you take your blood pressure  Extend your arm and support it on a flat surface  Your arm should be at the same level as your heart  Follow the directions that came with your blood pressure monitor  Check your blood pressure 2 times, 1 minute apart, before you take your medicine in the morning  Also check your blood pressure before your evening meal  Keep a record of your readings and bring it to your follow-up visits  Ask your healthcare provider what your blood pressure should be  · Manage any other health conditions you have  Health conditions such as diabetes can increase your risk for hypertension  Follow your healthcare provider's instructions and take all your medicines as directed  · Ask about all medicines  Certain medicines can increase your blood pressure  Examples include oral birth control pills, decongestants, herbal supplements, and NSAIDs, such as ibuprofen   Your healthcare provider can tell you which medicines are safe for you to take  This includes prescription and over-the-counter medicines  Lifestyle changes you can make to manage hypertension:  Your healthcare provider may recommend you work with a team to manage hypertension  The team may include medical experts such as a dietitian, an exercise or physical therapist, and a behavior therapist  Your family members may be included in helping you create lifestyle changes  · Limit sodium (salt) as directed  Too much sodium can affect your fluid balance  Check labels to find low-sodium or no-salt-added foods  Some low-sodium foods use potassium salts for flavor  Too much potassium can also cause health problems  Your healthcare provider will tell you how much sodium and potassium are safe for you to have in a day  He or she may recommend that you limit sodium to 2,300 mg a day  · Follow the meal plan recommended by your healthcare provider  A dietitian or your provider can give you more information on low-sodium plans or the DASH (Dietary Approaches to Stop Hypertension) eating plan  The DASH plan is low in sodium, processed sugar, unhealthy fats, and total fat  It is high in potassium, calcium, and fiber  These can be found in vegetables, fruit, and whole-grain foods  · Be physically active throughout the day  Physical activity, such as exercise, can help control your blood pressure and your weight  Be physically active for at least 30 minutes per day, on most days of the week  Include aerobic activity, such as walking or riding a bicycle  Also include strength training at least 2 times each week  Your healthcare providers can help you create a physical activity plan  · Decrease stress  This may help lower your blood pressure  Learn ways to relax, such as deep breathing or listening to music  · Limit alcohol as directed  Alcohol can increase your blood pressure   A drink of alcohol is 12 ounces of beer, 5 ounces of wine, or 1½ ounces of liquor  · Do not smoke  Nicotine and other chemicals in cigarettes and cigars can increase your blood pressure and also cause lung damage  Ask your healthcare provider for information if you currently smoke and need help to quit  E-cigarettes or smokeless tobacco still contain nicotine  Talk to your healthcare provider before you use these products  Follow up with your doctor or cardiologist as directed: You will need to return to have your blood pressure checked and to have other lab tests done  Write down your questions so you remember to ask them during your visits  © Copyright [employer] 2022 Information is for End User's use only and may not be sold, redistributed or otherwise used for commercial purposes  All illustrations and images included in CareNotes® are the copyrighted property of A D A M [employer] Inc  or Abhilash Perales   The above information is an  only  It is not intended as medical advice for individual conditions or treatments  Talk to your doctor, nurse or pharmacist before following any medical regimen to see if it is safe and effective for you

## 2022-03-31 NOTE — PROGRESS NOTES
Assessment/Plan:    Chronic hepatitis C without hepatic coma (HCC)  Previously treated in 2019, will repeat labs     Diverticulosis of colon  No sx/sx diverticulitis     Hypertension  Blood pressure is at goal today in the office at [facility] with current regimen: hctz 25 mg daily, enalapril 40 mg daily     Stage 3 chronic kidney disease, unspecified whether stage 3a or 3b CKD ([facility] [address] )  Lab Results   Component Value Date    EGFR 53 11/02/2021    EGFR 70 06/11/2021    EGFR 57 05/29/2020    CREATININE 1 05 11/02/2021    CREATININE 0 84 06/11/2021    CREATININE 1 00 05/29/2020     Stable, repeat labs     Hyperlipidemia  Lab Results   Component Value Date    CHOLESTEROL 201 (H) 06/11/2021    CHOLESTEROL 214 (H) 05/29/2020    CHOLESTEROL 165 07/05/2017     Lab Results   Component Value Date    HDL 53 06/11/2021    HDL 55 05/29/2020    HDL 70 (H) 07/05/2017     Lab Results   Component Value Date    TRIG 115 06/11/2021    TRIG 146 05/29/2020    TRIG 103 07/05/2017     Lab Results   Component Value Date    NONHDLC 148 06/11/2021    [place] 159 05/29/2020     Lab Results   Component Value Date    LDLCALC 125 (H) 06/11/2021     Continue with atorvastatin 20 mg daily   Repeat lipid panel     Vitamin D deficiency  Continue vitamin D supplement     Hope Albarado was seen today for follow-up  Diagnoses and all orders for this visit:    Breast cancer screening by mammogram  -     Mammo screening bilateral w 3d & cad; Future    Essential hypertension  -     CBC and differential; Future  -     Comprehensive metabolic panel; Future  -     Hemoglobin A1C; Future  -     Lipid panel; Future  -     TSH, 3rd generation with Free T4 reflex; Future  -     enalapril (VASOTEC) 20 mg tablet; Take 2 tablets (40 mg total) by mouth daily  -     hydrochlorothiazide (HYDRODIURIL) 25 mg tablet; Take 1 tablet (25 mg total) by mouth daily    Hyperlipidemia, unspecified hyperlipidemia type  -     atorvastatin (LIPITOR) 20 mg tablet;  Take 1 tablet (20 mg total) by mouth daily    Muscle cramp  -     Multiple Vitamins-Minerals (multivitamin with minerals) tablet; Take 1 tablet by mouth daily    Stage 3 chronic kidney disease, unspecified whether stage 3a or 3b CKD (HCC)    Chronic hepatitis C without hepatic coma (HCC)    Personal history of other endocrine, nutritional and metabolic disease   -     Hemoglobin A1C; Future    History of hepatitis C  -     Hepatitis C antibody; Future  -     Hepatitis C RNA, quantitative, PCR; Future    Primary hypertension    Vitamin D deficiency        Return in about 4 months (around 7/31/2022) for htn   Patient Instructions     Hypertension   AMBULATORY CARE:   Hypertension  is high blood pressure  Your blood pressure is the force of your blood moving against the walls of your arteries  Hypertension causes your blood pressure to get so high that your heart has to work much harder than normal  This can damage your heart  The cause of hypertension may not be known  This is called essential or primary hypertension  Hypertension caused by another medical condition, such as kidney disease, is called secondary hypertension  Common symptoms include the following:   · Headache    · Blurred vision    · Chest pain    · Dizziness or weakness    · Trouble breathing    · Nosebleeds    Call your local emergency number (911 in the [address] [place] [address],[address]) or have someone call if:   · You have chest pain  · You have any of the following signs of a heart attack:      ? Squeezing, pressure, or pain in your chest    ? You may  also have any of the following:     § Discomfort or pain in your back, neck, jaw, stomach, or arm    § Shortness of breath    § Nausea or vomiting    § Lightheadedness or a sudden cold sweat    · You become confused or have trouble speaking  · You suddenly feel lightheaded or have trouble breathing  Seek care immediately if:   · You have a severe headache or vision loss  · You have weakness in an arm or leg      Call your doctor or cardiologist if:   · You feel faint, dizzy, confused, or drowsy  · You have been taking your blood pressure medicine but your pressure is higher than your provider says it should be  · You have questions or concerns about your condition or care  What you need to know about the stages of hypertension:       · Normal blood pressure is 119/79 or lower   Your healthcare provider may only check your blood pressure each year if it stays at a normal level  · Elevated blood pressure is 120/79 to 129/79   This is sometimes called prehypertension  Your healthcare provider may suggest lifestyle changes to help lower your blood pressure to a normal level  He or she may then check it again in 3 to 6 months  · Stage 1 hypertension is 130/80  to 139/89   Your provider may recommend lifestyle changes, medication, and checks every 3 to 6 months until your blood pressure is controlled  · Stage 2 hypertension is 140/90 or higher   Your provider will recommend lifestyle changes and have you take 2 kinds of hypertension medicines  You will also need to have your blood pressure checked monthly until it is controlled  Treatment for hypertension  may include medicine to lower your blood pressure and lower your cholesterol level  A low cholesterol level helps prevent heart disease and makes it easier to control your blood pressure  Take your medicine exactly as directed  You may also need to make lifestyle changes  Manage hypertension:   · Check your blood pressure at home  Avoid smoking, caffeine, and exercise at least 30 minutes before checking your blood pressure  Sit and rest for 5 minutes before you take your blood pressure  Extend your arm and support it on a flat surface  Your arm should be at the same level as your heart  Follow the directions that came with your blood pressure monitor  Check your blood pressure 2 times, 1 minute apart, before you take your medicine in the morning   Also check your blood pressure before your evening meal  Keep a record of your readings and bring it to your follow-up visits  Ask your healthcare provider what your blood pressure should be  · Manage any other health conditions you have  Health conditions such as diabetes can increase your risk for hypertension  Follow your healthcare provider's instructions and take all your medicines as directed  · Ask about all medicines  Certain medicines can increase your blood pressure  Examples include oral birth control pills, decongestants, herbal supplements, and NSAIDs, such as ibuprofen  Your healthcare provider can tell you which medicines are safe for you to take  This includes prescription and over-the-counter medicines  Lifestyle changes you can make to manage hypertension:  Your healthcare provider may recommend you work with a team to manage hypertension  The team may include medical experts such as a dietitian, an exercise or physical therapist, and a behavior therapist  Your family members may be included in helping you create lifestyle changes  · Limit sodium (salt) as directed  Too much sodium can affect your fluid balance  Check labels to find low-sodium or no-salt-added foods  Some low-sodium foods use potassium salts for flavor  Too much potassium can also cause health problems  Your healthcare provider will tell you how much sodium and potassium are safe for you to have in a day  He or she may recommend that you limit sodium to 2,300 mg a day  · Follow the meal plan recommended by your healthcare provider  A dietitian or your provider can give you more information on low-sodium plans or the DASH (Dietary Approaches to Stop Hypertension) eating plan  The DASH plan is low in sodium, processed sugar, unhealthy fats, and total fat  It is high in potassium, calcium, and fiber  These can be found in vegetables, fruit, and whole-grain foods  · Be physically active throughout the day    Physical activity, such as exercise, can help control your blood pressure and your weight  Be physically active for at least 30 minutes per day, on most days of the week  Include aerobic activity, such as walking or riding a bicycle  Also include strength training at least 2 times each week  Your healthcare providers can help you create a physical activity plan  · Decrease stress  This may help lower your blood pressure  Learn ways to relax, such as deep breathing or listening to music  · Limit alcohol as directed  Alcohol can increase your blood pressure  A drink of alcohol is 12 ounces of beer, 5 ounces of wine, or 1½ ounces of liquor  · Do not smoke  Nicotine and other chemicals in cigarettes and cigars can increase your blood pressure and also cause lung damage  Ask your healthcare provider for information if you currently smoke and need help to quit  E-cigarettes or smokeless tobacco still contain nicotine  Talk to your healthcare provider before you use these products  Follow up with your doctor or cardiologist as directed: You will need to return to have your blood pressure checked and to have other lab tests done  Write down your questions so you remember to ask them during your visits  © Copyright [employer] 2022 Information is for End User's use only and may not be sold, redistributed or otherwise used for commercial purposes  All illustrations and images included in CareNotes® are the copyrighted property of [employer] A M , Inc  or [facility] Daniella Perales   The above information is an  only  It is not intended as medical advice for individual conditions or treatments  Talk to your doctor, nurse or pharmacist before following any medical regimen to see if it is safe and effective for you  Subjective:     Shakeel Jack is a 66 y o  female who  has a past medical history of Arthritis, Diverticulosis of colon, bleeding disorder, and Hypertension      She has no past medical history of Mental disorder or Substance abuse ([facility] [address] )  who presented to the office today for follow up  She has no new concerns today  She is overall doing well  The following portions of the patient's history were reviewed and updated as appropriate: allergies, current medications, past family history, past medical history, past social history, past surgical history and problem list     Current Outpatient Medications on File Prior to Visit   Medication Sig Dispense Refill    Calcium-Magnesium-Vitamin D (CALCIUM 500 PO) Take 1 tablet by mouth daily        ferrous gluconate (FERGON) 324 mg tablet Take 1 tablet by mouth 2 (two) times a day before meals for 30 days 60 tablet 0    [DISCONTINUED] atorvastatin (LIPITOR) 20 mg tablet TAKE 1 TABLET BY MOUTH EVERY DAY 30 tablet 0    [DISCONTINUED] enalapril (VASOTEC) 20 mg tablet TAKE 2 TABLETS BY MOUTH EVERY  tablet 0    [DISCONTINUED] hydrochlorothiazide (HYDRODIURIL) 25 mg tablet TAKE 1 TABLET BY MOUTH EVERY DAY 90 tablet 1    [DISCONTINUED] Multiple Vitamins-Minerals (MULTIVITAMIN WITH MINERALS) tablet Take 1 tablet by mouth daily (Patient not taking: Reported on 3/12/2021) 90 tablet 1    [DISCONTINUED] omeprazole (PriLOSEC) 20 mg delayed release capsule Take 20 mg by mouth daily       No current facility-administered medications on file prior to visit  Review of Systems   Constitutional: Negative for chills and fever  HENT: Negative for ear pain and sore throat  Eyes: Negative for pain and visual disturbance  Respiratory: Negative for cough and shortness of breath  Cardiovascular: Negative for chest pain and palpitations  Gastrointestinal: Negative for abdominal pain and vomiting  Genitourinary: Negative for dysuria and hematuria  Musculoskeletal: Negative for arthralgias and back pain  Skin: Negative for color change and rash  Neurological: Negative for seizures and syncope     All other systems reviewed and are negative  BMI Counseling: Body mass index is 25 22 kg/m²  The BMI is above normal  Nutrition recommendations include decreasing fast food intake, consuming healthier snacks and limiting drinks that contain sugar  Rationale for BMI follow-up plan is due to patient being overweight or obese  Depression Screening and Follow-up Plan: Patient was screened for depression during today's encounter  They screened negative with a PHQ-2 score of 0  Objective:    /74 (BP Location: Right arm, Patient Position: Sitting, Cuff Size: Standard)   Pulse 77   Temp 98 °F (36 7 °C) (Temporal)   Resp 16   Ht 5' 7" (1 702 m)   Wt 73 kg (161 lb)   SpO2 98%   BMI 25 22 kg/m²     Physical Exam  Vitals and nursing note reviewed  Constitutional:       General: She is not in acute distress  Appearance: She is well-developed  She is not diaphoretic  HENT:      Head: Normocephalic and atraumatic  Right Ear: External ear normal       Left Ear: External ear normal    Eyes:      Conjunctiva/sclera: Conjunctivae normal       Pupils: Pupils are equal, round, and reactive to light  Cardiovascular:      Rate and Rhythm: Normal rate and regular rhythm  Pulmonary:      Effort: Pulmonary effort is normal  No respiratory distress  Breath sounds: Normal breath sounds  No wheezing  Abdominal:      General: Bowel sounds are normal  There is no distension  Palpations: Abdomen is soft  Tenderness: There is no abdominal tenderness  Musculoskeletal:         General: No deformity  Normal range of motion  Cervical back: Normal range of motion and neck supple  Lymphadenopathy:      Cervical: No cervical adenopathy  Skin:     General: Skin is warm and dry  Capillary Refill: Capillary refill takes less than 2 seconds  Findings: No rash  Neurological:      Mental Status: She is alert and oriented to person, place, and time  Sensory: No sensory deficit        Coordination: Coordination normal       Deep Tendon Reflexes: Reflexes normal    Psychiatric:         Behavior: Behavior normal          MARISSA Garcia  03/31/22  3:48 PM

## 2022-03-31 NOTE — ASSESSMENT & PLAN NOTE
Lab Results   Component Value Date    EGFR 53 11/02/2021    EGFR 70 06/11/2021    EGFR 57 05/29/2020    CREATININE 1 05 11/02/2021    CREATININE 0 84 06/11/2021    CREATININE 1 00 05/29/2020     Stable, repeat labs

## 2022-07-18 ENCOUNTER — HOSPITAL ENCOUNTER (OUTPATIENT)
Dept: MAMMOGRAPHY | Facility: CLINIC | Age: 72
Discharge: HOME | End: 2022-07-18
Payer: MEDICARE

## 2022-07-18 ENCOUNTER — HOSPITAL ENCOUNTER (OUTPATIENT)
Dept: BONE DENSITY | Facility: CLINIC | Age: 72
Discharge: HOME | End: 2022-07-18
Payer: MEDICARE

## 2022-07-18 VITALS — HEIGHT: 67 IN | WEIGHT: 161 LBS | BODY MASS INDEX: 25.27 KG/M2

## 2022-07-18 DIAGNOSIS — N95.9 UNSPECIFIED MENOPAUSAL AND PERIMENOPAUSAL DISORDER: ICD-10-CM

## 2022-07-18 DIAGNOSIS — Z12.31 VISIT FOR SCREENING MAMMOGRAM: ICD-10-CM

## 2022-07-18 DIAGNOSIS — Z13.820 SCREENING FOR OSTEOPOROSIS: ICD-10-CM

## 2022-07-18 DIAGNOSIS — N95.1 MENOPAUSAL STATE: ICD-10-CM

## 2022-07-18 DIAGNOSIS — Z12.31 BREAST CANCER SCREENING BY MAMMOGRAM: ICD-10-CM

## 2022-07-18 PROCEDURE — 77063 BREAST TOMOSYNTHESIS BI: CPT

## 2022-07-18 PROCEDURE — 77067 SCR MAMMO BI INCL CAD: CPT

## 2022-07-18 PROCEDURE — 77080 DXA BONE DENSITY AXIAL: CPT

## 2022-07-28 ENCOUNTER — APPOINTMENT (OUTPATIENT)
Dept: LAB | Facility: CLINIC | Age: 72
End: 2022-07-28
Payer: MEDICARE

## 2022-07-28 DIAGNOSIS — Z86.39 PERSONAL HISTORY OF OTHER ENDOCRINE, NUTRITIONAL AND METABOLIC DISEASE: ICD-10-CM

## 2022-07-28 DIAGNOSIS — Z86.19 HISTORY OF HEPATITIS C: ICD-10-CM

## 2022-07-28 DIAGNOSIS — I10 ESSENTIAL HYPERTENSION: ICD-10-CM

## 2022-07-28 LAB
ALBUMIN SERPL BCP-MCNC: 4.1 G/DL (ref 3.5–5)
ALP SERPL-CCNC: 112 U/L (ref 46–116)
ALT SERPL W P-5'-P-CCNC: 23 U/L (ref 12–78)
ANION GAP SERPL CALCULATED.3IONS-SCNC: 3 MMOL/L (ref 4–13)
AST SERPL W P-5'-P-CCNC: 19 U/L (ref 5–45)
BASOPHILS # BLD AUTO: 0.07 THOUSANDS/ΜL (ref 0–0.1)
BASOPHILS NFR BLD AUTO: 1 % (ref 0–1)
BILIRUB SERPL-MCNC: 0.45 MG/DL (ref 0.2–1)
BUN SERPL-MCNC: 20 MG/DL (ref 5–25)
CALCIUM SERPL-MCNC: 9.6 MG/DL (ref 8.3–10.1)
CHLORIDE SERPL-SCNC: 105 MMOL/L (ref 96–108)
CHOLEST SERPL-MCNC: 120 MG/DL
CO2 SERPL-SCNC: 32 MMOL/L (ref 21–32)
CREAT SERPL-MCNC: 1.11 MG/DL (ref 0.6–1.3)
EOSINOPHIL # BLD AUTO: 0.16 THOUSAND/ΜL (ref 0–0.61)
EOSINOPHIL NFR BLD AUTO: 2 % (ref 0–6)
ERYTHROCYTE [DISTWIDTH] IN BLOOD BY AUTOMATED COUNT: 12.8 % (ref 11.6–15.1)
EST. AVERAGE GLUCOSE BLD GHB EST-MCNC: 134 MG/DL
GFR SERPL CREATININE-BSD FRML MDRD: 49 ML/MIN/1.73SQ M
GLUCOSE P FAST SERPL-MCNC: 111 MG/DL (ref 65–99)
HBA1C MFR BLD: 6.3 %
HCT VFR BLD AUTO: 42.5 % (ref 34.8–46.1)
HDLC SERPL-MCNC: 44 MG/DL
HGB BLD-MCNC: 13.6 G/DL (ref 11.5–15.4)
IMM GRANULOCYTES # BLD AUTO: 0.02 THOUSAND/UL (ref 0–0.2)
IMM GRANULOCYTES NFR BLD AUTO: 0 % (ref 0–2)
LDLC SERPL CALC-MCNC: 63 MG/DL (ref 0–100)
LYMPHOCYTES # BLD AUTO: 2.57 THOUSANDS/ΜL (ref 0.6–4.47)
LYMPHOCYTES NFR BLD AUTO: 33 % (ref 14–44)
MCH RBC QN AUTO: 32.1 PG (ref 26.8–34.3)
MCHC RBC AUTO-ENTMCNC: 32 G/DL (ref 31.4–37.4)
MCV RBC AUTO: 100 FL (ref 82–98)
MONOCYTES # BLD AUTO: 0.51 THOUSAND/ΜL (ref 0.17–1.22)
MONOCYTES NFR BLD AUTO: 7 % (ref 4–12)
NEUTROPHILS # BLD AUTO: 4.54 THOUSANDS/ΜL (ref 1.85–7.62)
NEUTS SEG NFR BLD AUTO: 57 % (ref 43–75)
NONHDLC SERPL-MCNC: 76 MG/DL
NRBC BLD AUTO-RTO: 0 /100 WBCS
PLATELET # BLD AUTO: 253 THOUSANDS/UL (ref 149–390)
PMV BLD AUTO: 11.6 FL (ref 8.9–12.7)
POTASSIUM SERPL-SCNC: 3.4 MMOL/L (ref 3.5–5.3)
PROT SERPL-MCNC: 7.8 G/DL (ref 6.4–8.4)
RBC # BLD AUTO: 4.24 MILLION/UL (ref 3.81–5.12)
SODIUM SERPL-SCNC: 140 MMOL/L (ref 135–147)
TRIGL SERPL-MCNC: 66 MG/DL
TSH SERPL DL<=0.05 MIU/L-ACNC: 1.09 UIU/ML (ref 0.45–4.5)
WBC # BLD AUTO: 7.87 THOUSAND/UL (ref 4.31–10.16)

## 2022-07-28 PROCEDURE — 36415 COLL VENOUS BLD VENIPUNCTURE: CPT

## 2022-07-28 PROCEDURE — 80061 LIPID PANEL: CPT

## 2022-07-28 PROCEDURE — 85025 COMPLETE CBC W/AUTO DIFF WBC: CPT

## 2022-07-28 PROCEDURE — 87522 HEPATITIS C REVRS TRNSCRPJ: CPT

## 2022-07-28 PROCEDURE — 84443 ASSAY THYROID STIM HORMONE: CPT

## 2022-07-28 PROCEDURE — 86803 HEPATITIS C AB TEST: CPT

## 2022-07-28 PROCEDURE — 80053 COMPREHEN METABOLIC PANEL: CPT

## 2022-07-28 PROCEDURE — 83036 HEMOGLOBIN GLYCOSYLATED A1C: CPT

## 2022-07-29 LAB
HCV AB SER QL: ABNORMAL
HCV RNA SERPL NAA+PROBE-ACNC: NORMAL IU/ML
TEST INFORMATION: NORMAL

## 2022-08-03 ENCOUNTER — OFFICE VISIT (OUTPATIENT)
Dept: FAMILY MEDICINE CLINIC | Facility: CLINIC | Age: 72
End: 2022-08-03

## 2022-08-03 VITALS
RESPIRATION RATE: 16 BRPM | HEART RATE: 98 BPM | SYSTOLIC BLOOD PRESSURE: 130 MMHG | DIASTOLIC BLOOD PRESSURE: 70 MMHG | WEIGHT: 156 LBS | TEMPERATURE: 98.7 F | BODY MASS INDEX: 24.48 KG/M2 | HEIGHT: 67 IN | OXYGEN SATURATION: 99 %

## 2022-08-03 DIAGNOSIS — I10 ESSENTIAL HYPERTENSION: ICD-10-CM

## 2022-08-03 DIAGNOSIS — B18.2 CHRONIC HEPATITIS C WITHOUT HEPATIC COMA (HCC): Primary | ICD-10-CM

## 2022-08-03 DIAGNOSIS — I10 PRIMARY HYPERTENSION: ICD-10-CM

## 2022-08-03 DIAGNOSIS — R25.2 MUSCLE CRAMP: ICD-10-CM

## 2022-08-03 DIAGNOSIS — E78.5 HYPERLIPIDEMIA, UNSPECIFIED HYPERLIPIDEMIA TYPE: ICD-10-CM

## 2022-08-03 DIAGNOSIS — N18.30 STAGE 3 CHRONIC KIDNEY DISEASE, UNSPECIFIED WHETHER STAGE 3A OR 3B CKD (HCC): ICD-10-CM

## 2022-08-03 DIAGNOSIS — K57.30 DIVERTICULOSIS OF COLON: ICD-10-CM

## 2022-08-03 DIAGNOSIS — K21.9 GASTROESOPHAGEAL REFLUX DISEASE WITHOUT ESOPHAGITIS: ICD-10-CM

## 2022-08-03 PROCEDURE — 99214 OFFICE O/P EST MOD 30 MIN: CPT | Performed by: NURSE PRACTITIONER

## 2022-08-03 RX ORDER — MULTIVIT-MIN/IRON FUM/FOLIC AC 7.5 MG-4
1 TABLET ORAL DAILY
Qty: 90 TABLET | Refills: 1 | Status: SHIPPED | OUTPATIENT
Start: 2022-08-03

## 2022-08-03 RX ORDER — ENALAPRIL MALEATE 20 MG/1
40 TABLET ORAL DAILY
Qty: 180 TABLET | Refills: 1 | Status: SHIPPED | OUTPATIENT
Start: 2022-08-03

## 2022-08-03 RX ORDER — ATORVASTATIN CALCIUM 20 MG/1
20 TABLET, FILM COATED ORAL DAILY
Qty: 90 TABLET | Refills: 1 | Status: SHIPPED | OUTPATIENT
Start: 2022-08-03

## 2022-08-03 RX ORDER — HYDROCHLOROTHIAZIDE 25 MG/1
25 TABLET ORAL DAILY
Qty: 90 TABLET | Refills: 1 | Status: SHIPPED | OUTPATIENT
Start: 2022-08-03

## 2022-08-03 NOTE — PROGRESS NOTES
Assessment/Plan:    Chronic hepatitis C without hepatic coma (HCC)  Negative hep C quantitative RNA     Hypertension  Blood pressure is at goal today in the office   Continue with current regimen: hctz 25 mg daily, enalapril 40 mg daily     Stage 3 chronic kidney disease, unspecified whether stage 3a or 3b CKD ([facility] )  Lab Results   Component Value Date    EGFR 49 07/28/2022    EGFR 53 11/02/2021    EGFR 70 06/11/2021    CREATININE 1 11 07/28/2022    CREATININE 1 05 11/02/2021    CREATININE 0 84 06/11/2021     Stable, avoid nephrotoxins  Continue to monitor   Referral to nephrology     Mariam Castano was seen today for follow-up and hypertension  Diagnoses and all orders for this visit:    Chronic hepatitis C without hepatic coma ([place] [address] )    Primary hypertension    Stage 3 chronic kidney disease, unspecified whether stage 3a or 3b CKD ([place] [address] )  -     Ambulatory Referral to Nephrology; Future    Diverticulosis of colon    Gastroesophageal reflux disease without esophagitis  -     Ambulatory Referral to Gastroenterology; Future    Hyperlipidemia, unspecified hyperlipidemia type  -     atorvastatin (LIPITOR) 20 mg tablet; Take 1 tablet (20 mg total) by mouth daily    Essential hypertension  -     enalapril (VASOTEC) 20 mg tablet; Take 2 tablets (40 mg total) by mouth daily  -     hydrochlorothiazide (HYDRODIURIL) 25 mg tablet; Take 1 tablet (25 mg total) by mouth daily    Muscle cramp  -     Multiple Vitamins-Minerals (multivitamin with minerals) tablet; Take 1 tablet by mouth daily        Return in about 4 months (around 12/3/2022) for htn   Subjective:     Armen Tubbs is a 67 y o  female who  has a past medical history of Arthritis, Diverticulosis of colon, bleeding disorder, and Hypertension  who presented to the office today for follow up         The following portions of the patient's history were reviewed and updated as appropriate: allergies, current medications, past family history, past medical history, past social history, past surgical history and problem list     Current Outpatient Medications on File Prior to Visit   Medication Sig Dispense Refill    Calcium-Magnesium-Vitamin D (CALCIUM 500 PO) Take 1 tablet by mouth daily        ferrous gluconate (FERGON) 324 mg tablet Take 1 tablet by mouth 2 (two) times a day before meals for 30 days 60 tablet 0    [DISCONTINUED] omeprazole (PriLOSEC) 20 mg delayed release capsule Take 20 mg by mouth daily       No current facility-administered medications on file prior to visit  Review of Systems   Constitutional: Negative for chills and fever  HENT: Negative for ear pain and sore throat  Eyes: Negative for pain and visual disturbance  Respiratory: Negative for cough and shortness of breath  Cardiovascular: Negative for chest pain and palpitations  Gastrointestinal: Negative for abdominal pain and vomiting  Genitourinary: Negative for dysuria and hematuria  Musculoskeletal: Negative for arthralgias and back pain  Skin: Negative for color change and rash  Neurological: Negative for seizures and syncope  All other systems reviewed and are negative  Objective:    /70 (BP Location: Left arm, Patient Position: Sitting, Cuff Size: Standard)   Pulse 98   Temp 98 7 °F (37 1 °C) (Temporal)   Resp 16   Ht 5' 7" (1 702 m)   Wt 70 8 kg (156 lb)   SpO2 99%   BMI 24 43 kg/m²     Physical Exam  Vitals and nursing note reviewed  Constitutional:       General: She is not in acute distress  Appearance: She is well-developed  She is not diaphoretic  HENT:      Head: Normocephalic and atraumatic  Eyes:      Pupils: Pupils are equal, round, and reactive to light  Cardiovascular:      Rate and Rhythm: Normal rate and regular rhythm  Heart sounds: Normal heart sounds  No murmur heard  No friction rub  No gallop  Pulmonary:      Effort: Pulmonary effort is normal  No respiratory distress        Breath sounds: Normal breath sounds  No wheezing  Abdominal:      General: Bowel sounds are normal  There is no distension  Palpations: Abdomen is soft  Tenderness: There is no abdominal tenderness  There is no guarding or rebound  Musculoskeletal:         General: No deformity  Normal range of motion  Cervical back: Normal range of motion and neck supple  Lymphadenopathy:      Cervical: No cervical adenopathy  Skin:     General: Skin is warm and dry  Capillary Refill: Capillary refill takes less than 2 seconds  Findings: No rash  Neurological:      Mental Status: She is alert and oriented to person, place, and time  Sensory: No sensory deficit        Coordination: Coordination normal       Deep Tendon Reflexes: Reflexes normal    Psychiatric:         Behavior: Behavior normal          MARISSA Dong  08/04/22  12:11 PM

## 2022-08-04 NOTE — ASSESSMENT & PLAN NOTE
Lab Results   Component Value Date    EGFR 49 07/28/2022    EGFR 53 11/02/2021    EGFR 70 06/11/2021    CREATININE 1 11 07/28/2022    CREATININE 1 05 11/02/2021    CREATININE 0 84 06/11/2021     Stable, avoid nephrotoxins  Continue to monitor   Referral to nephrology

## 2022-08-04 NOTE — ASSESSMENT & PLAN NOTE
Blood pressure is at goal today in the office   Continue with current regimen: hctz 25 mg daily, enalapril 40 mg daily

## 2022-08-10 ENCOUNTER — TELEPHONE (OUTPATIENT)
Dept: NEPHROLOGY | Facility: CLINIC | Age: 72
End: 2022-08-10

## 2022-08-12 NOTE — TELEPHONE ENCOUNTER
New Patient Intake Form   Patient Details   Lester Lambert     1950     [id number]     Appointment Information   Who is calling to schedule? If not patient, what is callers name? Patient     Referring Provider  Isidoro Eason   Referring Provider Number [phone]   Reason for Appt (Diagnosis) - Stage 3 chronic kidney disease, unspecified whether stage 3a or 3b CKD    Is patient aware of why they are being referred? yes   Does Patient have labs done at [place] [address]? If not, where do they go? yes   Has patient had labs / urine work done? List date of most recent lab / urine work yes   Has patient had a BMP & CBC done in the past 2 years? If so, list the date yes   Has patient been hospitalized recently? If yes, list name and location of hospital they were In no   Has patient been seen by a Nephrologist before? If yes, list name, location and phone number no   Has patient been see by another Specialty before (ex  Neurology, urology, cardiology)? If yes, please list name, and specialty  infectious diseases    Has the patient had imaging done? If so, list the most recent date and type of imaging yes   Does the patient has a stone analysis report if history of kidney stones? no   Appointment Details   Is there a referral on file?  yes   Appointment Date 12/07   Location [place]    Miscellaneous

## 2022-08-22 ENCOUNTER — TELEPHONE (OUTPATIENT)
Dept: DIABETES SERVICES | Facility: OTHER | Age: 72
End: 2022-08-22

## 2022-08-22 NOTE — TELEPHONE ENCOUNTER
Aamir Reagan called asking for information about prediabetes classes  Unfortunately, classes are for patients with the diagnosis of diabetes  We do not have a formal prediabetes program to offer at this time

## 2022-10-13 ENCOUNTER — OFFICE VISIT (OUTPATIENT)
Dept: GASTROENTEROLOGY | Facility: MEDICAL CENTER | Age: 72
End: 2022-10-13
Payer: MEDICARE

## 2022-10-13 VITALS
HEIGHT: 67 IN | SYSTOLIC BLOOD PRESSURE: 120 MMHG | HEART RATE: 77 BPM | DIASTOLIC BLOOD PRESSURE: 72 MMHG | TEMPERATURE: 97.6 F | OXYGEN SATURATION: 99 % | BODY MASS INDEX: 24.48 KG/M2 | WEIGHT: 156 LBS

## 2022-10-13 DIAGNOSIS — R10.9 LEFT SIDED ABDOMINAL PAIN: Primary | ICD-10-CM

## 2022-10-13 DIAGNOSIS — K21.9 GASTROESOPHAGEAL REFLUX DISEASE WITHOUT ESOPHAGITIS: ICD-10-CM

## 2022-10-13 DIAGNOSIS — Z12.11 COLON CANCER SCREENING: ICD-10-CM

## 2022-10-13 DIAGNOSIS — K31.819 GAVE (GASTRIC ANTRAL VASCULAR ECTASIA): ICD-10-CM

## 2022-10-13 PROCEDURE — 99204 OFFICE O/P NEW MOD 45 MIN: CPT | Performed by: INTERNAL MEDICINE

## 2022-10-13 NOTE — PROGRESS NOTES
[facility] - Outpatient Consultation  Ibrahima Spine 67 y o  female MRN: [id number]  Encounter: [id number]          ASSESSMENT AND PLAN:      1  Gastroesophageal reflux disease w ithout esophagitis  2  Left sided abdominal pain  4  GAVE (gastric antral vascular ectasia)  Patient has no overt symptoms of acid reflux  She has intermittent left upper quadrant abdominal pain with no overt association with certain food  Plan for EGD to rule out H pylori infection, evaluate for GAVE    3  Colon cancer screening  Patient's previous colonoscopy was a fair prep  She is due for colon cancer screening  Will schedule colonoscopy  Patient was counseled on the benefits and risks of endoscopic intervention including but not limited to bleeding, infection, bowel perforation  Patient also understands colonoscopy is not 100% sensitive to detect polyps  - polyethylene glycol (GOLYTELY) 4000 mL solution; Take 4,000 mL by mouth once for 1 dose  Dispense: 4000 mL; Refill: 0  - bisacodyl (DULCOLAX) 5 mg EC tablet; Take 2 tablets (10 mg total) by mouth once for 1 dose  Dispense: 2 tablet; Refill: 0  - Colonoscopy; Future  - EGD; Future      ______________________________________________________________________    HPI:      68 yo F with hx of CKD referred for evaluation of right-sided abdominal pain and colon cancer screening  Patient was seen in the office and as inpatient for rectal bleeding  She underwent endoscopy at that time showing signs of gave and colonoscopy was a fair prep with diverticulosis  No active bleeding source was identified  She reported her rectal bleeding has stopped  She has regular formed bowel movement now  She is lactose intolerant so she takes lactose free milk  She denies any recurrent rectal bleeding  Her weight has been stable  She denies nausea vomiting, dysphagia  Previous chart was reviewed        REVIEW OF SYSTEMS:    CONSTITUTIONAL: Denies any fever, chills, rigors, and weight loss  HEENT: No earache or tinnitus  Denies hearing loss or visual disturbances  CARDIOVASCULAR: No chest pain or palpitations  RESPIRATORY: Denies any cough, hemoptysis, shortness of breath or dyspnea on exertion  GASTROINTESTINAL: As noted in the History of Present Illness  GENITOURINARY: No problems with urination  Denies any hematuria or dysuria  NEUROLOGIC: No dizziness or vertigo, denies headaches  MUSCULOSKELETAL: Denies any muscle or joint pain  SKIN: Denies skin rashes or itching  ENDOCRINE: Denies excessive thirst  Denies intolerance to heat or cold  PSYCHOSOCIAL: Denies depression or anxiety  Denies any recent memory loss  Historical Information   Past Medical History:   Diagnosis Date   • Arthritis    • Diverticulosis of colon    • Hx of bleeding disorder     rectal bleeding   • Hypertension      Past Surgical History:   Procedure Laterality Date   • COLONOSCOPY N/A 3/28/2017    Procedure: COLONOSCOPY;  Surgeon: Lizy Oropeza MD;  Location: [facility]; Service:    • COLONOSCOPY N/A 3/26/2017    Procedure: COLONOSCOPY with hemostasis clip placement;  Surgeon: Mandy Morrison MD;  Location: [facility]; Service:    • COLONOSCOPY N/A 1/27/2017    Procedure: COLONOSCOPY;  Surgeon: Tonya Xavier MD;  Location: [facility]; Service:    • COLONOSCOPY N/A 4/7/2017    Procedure: COLONOSCOPY;  Surgeon: Mandy Morrison MD;  Location: [facility]; Service:    • ESOPHAGOGASTRODUODENOSCOPY N/A 3/27/2017    Procedure: ESOPHAGOGASTRODUODENOSCOPY (EGD) with APC; Surgeon: Lizy Oropeza MD;  Location: [facility]; Service:    • MN SIGMOIDOSCOPY FLX DX W/COLLJ SPEC BR/WA IF PFRMD N/A 3/27/2017    Procedure: Aiyana Ball;  Surgeon: Lizy Oropeza MD;  Location: [facility];   Service: Gastroenterology   • TUBAL LIGATION       Social History   Social History     Substance and Sexual Activity   Alcohol Use No     Social History     Substance and Sexual Activity   Drug Use No     Social History Tobacco Use   Smoking Status Never Smoker   Smokeless Tobacco Never Used     Family History   Problem Relation Age of Onset   • Breast cancer Mother 61   • No Known Problems Father    • No Known Problems Sister    • No Known Problems Sister    • No Known Problems Sister    • Breast cancer Daughter 52   • No Known Problems Maternal Grandmother    • No Known Problems Maternal Grandfather    • No Known Problems Paternal Grandmother    • No Known Problems Paternal Grandfather    • No Known Problems Paternal Aunt        Meds/Allergies       Current Outpatient Medications:   •  atorvastatin (LIPITOR) 20 mg tablet  •  bisacodyl (DULCOLAX) 5 mg EC tablet  •  Calcium-Magnesium-Vitamin D (CALCIUM 500 PO)  •  enalapril (VASOTEC) 20 mg tablet  •  hydrochlorothiazide (HYDRODIURIL) 25 mg tablet  •  Multiple Vitamins-Minerals (multivitamin with minerals) tablet  •  polyethylene glycol (GOLYTELY) 4000 mL solution  •  ferrous gluconate (FERGON) 324 mg tablet    No Known Allergies        Objective     Blood pressure 120/72, pulse 77, temperature 97 6 °F (36 4 °C), temperature source Tympanic, height 5' 7" (1 702 m), weight 70 8 kg (156 lb), SpO2 99 %, not currently breastfeeding  Body mass index is 24 43 kg/m²  PHYSICAL EXAM:      General Appearance:   Alert, cooperative, no distress   HEENT:   Normocephalic, atraumatic, anicteric      Neck:  Supple, symmetrical, trachea midline   Lungs:   Clear to auscultation bilaterally; no rales, rhonchi or wheezing; respirations unlabored    Heart[de-identified]   Regular rate and rhythm; no murmur, rub, or gallop     Abdomen:   Soft, non-tender, non-distended; normal bowel sounds; no masses, no organomegaly    Genitalia:   Deferred    Rectal:   Deferred    Extremities:  No cyanosis, clubbing or edema    Pulses:  2+ and symmetric    Skin:  No jaundice, rashes, or lesions    Lymph nodes:  No palpable cervical lymphadenopathy        Lab Results:   No visits with results within 1 Day(s) from this visit  Latest known visit with results is:   Appointment on 07/28/2022   Component Date Value   • WBC 07/28/2022 7 87    • RBC 07/28/2022 4 24    • Hemoglobin 07/28/2022 13 6    • Hematocrit 07/28/2022 42 5    • MCV 07/28/2022 100 (A)   • MCH 07/28/2022 32 1    • MCHC 07/28/2022 32 0    • RDW 07/28/2022 12 8    • MPV 07/28/2022 11 6    • Platelets 80/45/0628 253    • nRBC 07/28/2022 0    • Neutrophils Relative 07/28/2022 57    • Immat GRANS % 07/28/2022 0    • Lymphocytes Relative 07/28/2022 33    • Monocytes Relative 07/28/2022 7    • Eosinophils Relative 07/28/2022 2    • Basophils Relative 07/28/2022 1    • Neutrophils Absolute 07/28/2022 4 54    • Immature Grans Absolute 07/28/2022 0 02    • Lymphocytes Absolute 07/28/2022 2 57    • Monocytes Absolute 07/28/2022 0 51    • Eosinophils Absolute 07/28/2022 0 16    • Basophils Absolute 07/28/2022 0 07    • Sodium 07/28/2022 140    • Potassium 07/28/2022 3 4 (A)   • Chloride 07/28/2022 105    • CO2 07/28/2022 32    • ANION GAP 07/28/2022 3 (A)   • BUN 07/28/2022 20    • Creatinine 07/28/2022 1 11    • Glucose, Fasting 07/28/2022 111 (A)   • Calcium 07/28/2022 9 6    • AST 07/28/2022 19    • ALT 07/28/2022 23    • Alkaline Phosphatase 07/28/2022 112    • Total Protein 07/28/2022 7 8    • Albumin 07/28/2022 4 1    • Total Bilirubin 07/28/2022 0 45    • eGFR 07/28/2022 49    • Hemoglobin A1C 07/28/2022 6 3 (A)   • EAG 07/28/2022 134    • Cholesterol 07/28/2022 120    • Triglycerides 07/28/2022 66    • HDL, Direct 07/28/2022 44 (A)   • LDL Calculated 07/28/2022 63    • Non-HDL-Chol (CHOL-HDL) 07/28/2022 76    • TSH 3RD GENERATON 07/28/2022 1 090    • Hepatitis C Ab 07/28/2022 High Reactive (A)   • HCV PCR Quantitative 07/28/2022 HCV Not Detected    • Test Information 07/28/2022 Comment          Radiology Results:   No results found

## 2022-10-13 NOTE — PATIENT INSTRUCTIONS
Scheduled date of EGD/colonoscopy (as of today): 12/29/22  Physician performing EGD/colonoscopy: Dr Cassidy Harris  [facility]  Desired bowel prep reviewed with patient:  benjamin/dulcolax  Instructions reviewed with patient by: MA  Clearances:

## 2022-12-05 ENCOUNTER — HOSPITAL ENCOUNTER (OUTPATIENT)
Dept: RADIOLOGY | Facility: HOSPITAL | Age: 72
Discharge: HOME | End: 2022-12-05

## 2022-12-05 ENCOUNTER — APPOINTMENT (OUTPATIENT)
Dept: LAB | Facility: HOSPITAL | Age: 72
End: 2022-12-05

## 2022-12-05 ENCOUNTER — OFFICE VISIT (OUTPATIENT)
Dept: FAMILY MEDICINE CLINIC | Facility: CLINIC | Age: 72
End: 2022-12-05

## 2022-12-05 VITALS
WEIGHT: 150.5 LBS | TEMPERATURE: 97.5 F | HEART RATE: 85 BPM | DIASTOLIC BLOOD PRESSURE: 84 MMHG | OXYGEN SATURATION: 99 % | BODY MASS INDEX: 23.62 KG/M2 | RESPIRATION RATE: 16 BRPM | HEIGHT: 67 IN | SYSTOLIC BLOOD PRESSURE: 122 MMHG

## 2022-12-05 DIAGNOSIS — R73.03 PRE-DIABETES: ICD-10-CM

## 2022-12-05 DIAGNOSIS — M54.42 ACUTE LEFT-SIDED LOW BACK PAIN WITH LEFT-SIDED SCIATICA: ICD-10-CM

## 2022-12-05 DIAGNOSIS — I10 PRIMARY HYPERTENSION: Primary | ICD-10-CM

## 2022-12-05 DIAGNOSIS — M19.90 ARTHRITIS: ICD-10-CM

## 2022-12-05 DIAGNOSIS — I10 PRIMARY HYPERTENSION: ICD-10-CM

## 2022-12-05 DIAGNOSIS — E55.9 VITAMIN D DEFICIENCY: ICD-10-CM

## 2022-12-05 DIAGNOSIS — N18.30 STAGE 3 CHRONIC KIDNEY DISEASE, UNSPECIFIED WHETHER STAGE 3A OR 3B CKD (HCC): ICD-10-CM

## 2022-12-05 LAB
25(OH)D3 SERPL-MCNC: 67.6 NG/ML (ref 30–100)
ALBUMIN SERPL BCP-MCNC: 4.7 G/DL (ref 3.5–5)
ALP SERPL-CCNC: 126 U/L (ref 43–122)
ALT SERPL W P-5'-P-CCNC: 21 U/L
ANION GAP SERPL CALCULATED.3IONS-SCNC: 7 MMOL/L (ref 5–14)
AST SERPL W P-5'-P-CCNC: 24 U/L (ref 14–36)
BASOPHILS # BLD AUTO: 0.06 THOUSANDS/ÂΜL (ref 0–0.1)
BASOPHILS NFR BLD AUTO: 1 % (ref 0–1)
BILIRUB SERPL-MCNC: 0.57 MG/DL (ref 0.2–1)
BUN SERPL-MCNC: 18 MG/DL (ref 5–25)
CALCIUM SERPL-MCNC: 9.9 MG/DL (ref 8.4–10.2)
CHLORIDE SERPL-SCNC: 101 MMOL/L (ref 96–108)
CO2 SERPL-SCNC: 31 MMOL/L (ref 21–32)
CREAT SERPL-MCNC: 1.02 MG/DL (ref 0.6–1.2)
EOSINOPHIL # BLD AUTO: 0.08 THOUSAND/ÂΜL (ref 0–0.61)
EOSINOPHIL NFR BLD AUTO: 1 % (ref 0–6)
ERYTHROCYTE [DISTWIDTH] IN BLOOD BY AUTOMATED COUNT: 12.6 % (ref 11.6–15.1)
GFR SERPL CREATININE-BSD FRML MDRD: 54 ML/MIN/1.73SQ M
GLUCOSE P FAST SERPL-MCNC: 115 MG/DL (ref 70–99)
HCT VFR BLD AUTO: 41.6 % (ref 34.8–46.1)
HGB BLD-MCNC: 14 G/DL (ref 11.5–15.4)
IMM GRANULOCYTES # BLD AUTO: 0.04 THOUSAND/UL (ref 0–0.2)
IMM GRANULOCYTES NFR BLD AUTO: 0 % (ref 0–2)
LYMPHOCYTES # BLD AUTO: 2.45 THOUSANDS/ÂΜL (ref 0.6–4.47)
LYMPHOCYTES NFR BLD AUTO: 23 % (ref 14–44)
MCH RBC QN AUTO: 32.6 PG (ref 26.8–34.3)
MCHC RBC AUTO-ENTMCNC: 33.7 G/DL (ref 31.4–37.4)
MCV RBC AUTO: 97 FL (ref 82–98)
MONOCYTES # BLD AUTO: 0.59 THOUSAND/ÂΜL (ref 0.17–1.22)
MONOCYTES NFR BLD AUTO: 6 % (ref 4–12)
NEUTROPHILS # BLD AUTO: 7.41 THOUSANDS/ÂΜL (ref 1.85–7.62)
NEUTS SEG NFR BLD AUTO: 69 % (ref 43–75)
NRBC BLD AUTO-RTO: 0 /100 WBCS
PLATELET # BLD AUTO: 278 THOUSANDS/UL (ref 149–390)
PMV BLD AUTO: 11.3 FL (ref 8.9–12.7)
POTASSIUM SERPL-SCNC: 3.3 MMOL/L (ref 3.5–5.3)
PROT SERPL-MCNC: 8.7 G/DL (ref 6.4–8.4)
RBC # BLD AUTO: 4.3 MILLION/UL (ref 3.81–5.12)
SODIUM SERPL-SCNC: 139 MMOL/L (ref 135–147)
WBC # BLD AUTO: 10.63 THOUSAND/UL (ref 4.31–10.16)

## 2022-12-05 RX ORDER — METHYLPREDNISOLONE 4 MG/1
TABLET ORAL
Qty: 21 EACH | Refills: 0 | Status: SHIPPED | OUTPATIENT
Start: 2022-12-05

## 2022-12-05 RX ORDER — SENNOSIDES 8.6 MG
650 CAPSULE ORAL EVERY 8 HOURS PRN
Qty: 30 TABLET | Refills: 0 | Status: SHIPPED | OUTPATIENT
Start: 2022-12-05

## 2022-12-05 RX ORDER — TIZANIDINE 2 MG/1
2 TABLET ORAL EVERY 8 HOURS PRN
Qty: 40 TABLET | Refills: 0 | Status: SHIPPED | OUTPATIENT
Start: 2022-12-05

## 2022-12-05 NOTE — PATIENT INSTRUCTIONS
Core Strengthening Exercises   WHAT YOU NEED TO KNOW:   Your core includes the muscles of your lower back, hip, pelvis, and abdomen  Core strengthening exercises help heal and strengthen these muscles  This helps prevent another injury, and keeps your pelvis, spine, and hips in the correct position  DISCHARGE INSTRUCTIONS:   Contact your healthcare provider if:   You have sharp or worsening pain during exercise or at rest     You have questions or concerns about your shoulder exercises  Safety tips:  Talk to your healthcare provider before you start an exercise program  A physical therapist can teach you how to do core strengthening exercises safely  Do the exercises on a mat or firm surface  A firm surface will support your spine and prevent low back pain  Do not do these exercises on a bed  Move slowly and smoothly  Avoid fast or jerky motions  Stop if you feel pain  Core exercises should not be painful  Stop if you feel pain  Breathe normally during core exercises  Do not hold your breath  This may cause an increase in blood pressure and prevent muscle strengthening  Your healthcare provider will tell you when to inhale and exhale during the exercise  Begin all of your exercises with abdominal bracing  Abdominal bracing helps warm up your core muscles  You can also practice abdominal bracing throughout the day  Lie on your back with your knees bent and feet flat on the floor  Place your arms in a relaxed position beside your body  Tighten your abdominal muscles  Pull your belly button in and up toward your spine  Hold for 5 seconds  Relax your muscles  Repeat 10 times  Core strengthening exercises: Your healthcare provider will tell you how often to do these exercises  The provider will also tell you how many repetitions of each exercise you should do  Hold each exercise for 5 seconds or as directed  As you get stronger, increase your hold to 10 to 15 seconds   You can do some of these exercises on a stability ball, or with a weight  Ask your healthcare provider how to use a stability ball or weight for these exercises:  Bridging:  Lie on your back with your knees bent and feet flat on the floor  Rest your arms at your side  Tighten your buttocks, and then lift your hips 1 inch off the floor  Hold for 5 seconds  When you can do this exercise without pain for 10 seconds, increase the distance you lift your hips  A good goal is to be able to lift your hips so that your shoulders, hips, and knees are in a straight line  Dead bug:  Lie on your back with your knees bent and feet flat on the floor  Place your arms in a relaxed position beside your body  Begin with abdominal bracing  Next, raise one leg, keeping your knee bent  Hold for 5 seconds  Repeat with the other leg  When you can do this exercise without pain for 10 to 15 seconds, you may raise one straight leg and hold  Repeat with the other leg  Quadruped:  Place your hands and knees on the floor  Keep your wrists directly below your shoulders and your knees directly below your hips  Pull your belly button in toward your spine  Do not flatten or arch your back  Tighten your abdominal muscles below your belly button  Hold for 5 seconds  When you can do this exercise without pain for 10 to 15 seconds, you may extend one arm and hold  Repeat on the other side  Side bridge exercises:      Standing side bridge:  Stand next to a wall and extend one arm toward the wall  Place your palm flat on the wall with your fingers pointing upward  Begin with abdominal bracing  Next, without moving your feet, slowly bend your arm to 90 degrees  Hold for 5 seconds  Repeat on the other side  When you can do this exercise without pain for 10 to 15 seconds, you may do the bent leg side bridge on the floor  Bent leg side bridge:  Lie on one side with your legs, hips, and shoulders in a straight line   Prop yourself up onto your forearm so your elbow is directly below your shoulder  Bend your knees back to 90 degrees  Begin with abdominal bracing  Next, lift your hips and balance yourself on your forearm and knees  Hold for 5 seconds  Repeat on the other side  When you can do this exercise without pain for 10 to 15 seconds, you may do the straight leg side bridge on the floor  Straight leg side bridge:  Lie on one side with your legs, hips, and shoulders in a straight line  Prop yourself up onto your forearm so your elbow is directly below your shoulder  Begin with abdominal bracing  Lift your hips off the floor and balance yourself on your forearm and the outside of your flexed foot  Do not let your ankle bend sideways  Hold for 5 seconds  Repeat on the other side  When you can do this exercise without pain for 10 to 15 seconds, ask your healthcare provider for more advanced exercises  Superman:  Lie on your stomach  Extend your arms forward on the floor  Tighten your abdominal muscles and lift your right hand and left leg off the floor  Hold this position  Slowly return to the starting position  Tighten your abdominal muscles and lift your left hand and right leg off the floor  Hold this position  Slowly return to the starting position  Clam:  Lie on your side with your knees bent  Put your bottom arm under your head to keep your neck in line  Put your top hand on your hip to keep your pelvis from moving  Put your heels together, and keep them together during this exercise  Slowly raise your top knee toward the ceiling  Then lower your leg so your knees are together  Repeat this exercise 10 times  Then switch sides and do the exercise 10 times with the other leg  Curl up:  Lie on your back with your knees bent and feet flat on the floor  Place your hands, palms down, underneath your lower back  Next, with your elbows on the floor, lift your shoulders and chest 2 to 3 inches off the floor   Keep your head in line with your shoulders  Hold this position  Slowly return to the starting position  Straight leg raises:  Lie on your back with one leg straight  Bend the other knee and place your foot flat on the floor  Tighten your abdominal muscles  Keep your leg straight and slowly lift it straight up 6 to 12 inches off the floor  Hold this position  Lower your leg slowly  Do as many repetitions as directed on this side  Repeat with the other leg  Plank:  Lie on your stomach  Bend your elbows and place your forearms flat on the floor  Lift your chest, stomach, and knees off the floor  Make sure your elbows are below your shoulders  Your body should be in a straight line  Do not let your hips or lower back sink to the ground  Squeeze your abdominal muscles together and hold for 15 seconds  To make this exercise harder, hold for 30 seconds or lift 1 leg at a time  Bicycles:  Lie on your back  Bend both knees and bring them toward your chest  Your calves should be parallel to the floor  Place the palms of your hands on the back of your head  Straighten your right leg and keep it lifted 2 inches off the floor  Raise your head and shoulders off the floor and twist towards your left  Keep your head and shoulders lifted  Bend your right knee while you straighten your left leg  Keep your left leg 2 inches off the floor  Twist your head and chest towards the left leg  Continue to straighten 1 leg at a time and twist        Follow up with your doctor as directed:  Write down your questions so you remember to ask them during your visits  © Copyright [employer] 2022 Information is for End User's use only and may not be sold, redistributed or otherwise used for commercial purposes  All illustrations and images included in CareNotes® are the copyrighted property of [employer]  or [facility] Daniella Perales   The above information is an  only   It is not intended as medical advice for individual conditions or treatments  Talk to your doctor, nurse or pharmacist before following any medical regimen to see if it is safe and effective for you  Lower Back Exercises   WHAT YOU NEED TO KNOW:   What do I need to know about lower back exercises? Lower back exercises help heal and strengthen your back muscles to prevent another injury  Ask your healthcare provider if you need to see a physical therapist for more advanced exercises  Do the exercises on a mat or firm surface  (not on a bed) to support your spine and prevent low back pain  Move slowly and smoothly  Avoid fast or jerky motions  Breathe normally  Do not hold your breath  Stop if you feel pain  It is normal to feel some discomfort at first  Regular exercise will help decrease your discomfort over time  How do I perform lower back exercises safely? Your healthcare provider may recommend that you do back exercises 10 to 30 minutes each day  He may also recommend that you do exercises 1 to 3 times each day  Ask your healthcare provider which exercises are best for you and how often to do them  Ankle pumps:  Lie on your back  Move your foot up (with your toes pointing toward your head)  Then, move your foot down (with your toes pointing away from you)  Repeat this exercise 10 times on each side  Heel slides:  Lie on your back  Slowly bend one leg and then straighten it  Next, bend the other leg and then straighten it  Repeat 10 times on each side  Pelvic tilt:  Lie on your back with your knees bent and feet flat on the floor  Place your arms in a relaxed position beside your body  Tighten the muscles of your abdomen and flatten your back against the floor  Hold for 5 seconds  Repeat 5 times  Back stretch:  Lie on your back with your hands behind your head  Bend your knees and turn the lower half of your body to one side  Hold this position for 10 seconds  Repeat 3 times on each side           Straight leg raises: Lie on your back with one leg straight  Bend the other knee  Tighten your abdomen and then slowly lift the straight leg up about 6 to 12 inches off the floor  Hold for 1 to 5 seconds  Lower your leg slowly  Repeat 10 times on each leg  Knee-to-chest:  Lie on your back with your knees bent and feet flat on the floor  Pull one of your knees toward your chest and hold it there for 5 seconds  Return your leg to the starting position  Lift the other knee toward your chest and hold for 5 seconds  Do this 5 times on each side  Cat and camel:  Place your hands and knees on the floor  Arch your back upward toward the ceiling and lower your head  Round out your spine as much as you can  Hold for 5 seconds  Lift your head upward and push your chest downward toward the floor  Hold for 5 seconds  Do 3 sets or as directed  Wall squats:  Stand with your back against a wall  Tighten the muscles of your abdomen  Slowly lower your body until your knees are bent at a 45 degree angle  Hold this position for 5 seconds  Slowly move back up to a standing position  Repeat 10 times  Curl up:  Lie on your back with your knees bent and feet flat on the floor  Place your hands, palms down, underneath the curve in your lower back  Next, with your elbows on the floor, lift your shoulders and chest 2 to 3 inches  Keep your head in line with your shoulders  Hold this position for 5 seconds  When you can do this exercise without pain for 10 to 15 seconds, you may add a rotation  While your shoulders and chest are lifted off the ground, turn slightly to the left and hold  Repeat on the other side  Bird dog:  Place your hands and knees on the floor  Keep your wrists directly below your shoulders and your knees directly below your hips  Pull your belly button in toward your spine  Do not flatten or arch your back  Tighten your abdominal muscles  Raise one arm straight out so that it is aligned with your head  Next, raise the leg opposite your arm  Hold this position for 15 seconds  Lower your arm and leg slowly and change sides  Do 5 sets  When should I seek immediate care? You have severe pain that prevents you from moving  When should I contact my healthcare provider? Your pain becomes worse  You have new pain  You have questions or concerns about your condition or care  CARE AGREEMENT:   You have the right to help plan your care  Learn about your health condition and how it may be treated  Discuss treatment options with your healthcare providers to decide what care you want to receive  You always have the right to refuse treatment  The above information is an  only  It is not intended as medical advice for individual conditions or treatments  Talk to your doctor, nurse or pharmacist before following any medical regimen to see if it is safe and effective for you  © Copyright [employer] 2022 Information is for End User's use only and may not be sold, redistributed or otherwise used for commercial purposes   All illustrations and images included in CareNotes® are the copyrighted property of [employer] M , Inc  or [address] [place] [address]

## 2022-12-05 NOTE — PROGRESS NOTES
Name: Yelitza Tay      :       MRN: [id number]  Encounter Provider: MARISSA Mcmillan  Encounter Date: 2022   Encounter department: [address] [place] [address]  Primary hypertension  Assessment & Plan:  Blood pressure is at goal today in the office   Continue with current regimen: hctz 25 mg daily, enalapril 40 mg daily     Orders:  -     Hemoglobin A1C; Future  -     Comprehensive metabolic panel; Future  -     CBC and differential; Future    2  Stage 3 chronic kidney disease, unspecified whether stage 3a or 3b CKD [facility])  Assessment & Plan:  Lab Results   Component Value Date    EGFR 54 2022    EGFR 49 2022    EGFR 53 2021    CREATININE 1 02 2022    CREATININE 1 11 2022    CREATININE 1 05 2021     Avoid NSAIDS, follow up with nephrology       3  Arthritis  -     Vitamin D 25 hydroxy; Future    4  Acute left-sided low back pain with left-sided sciatica  Assessment & Plan:  Chronic issue with recent exacerbation   Will check imaging   Discussed non pharmacological pain interventions including: heat, ice, stretching, strengthening exercises, biofeedback, meditation, yoga, massage       Orders:  -     XR spine lumbar minimum 4 views non injury; Future; Expected date: 2022  -     acetaminophen (TYLENOL) 650 mg CR tablet; Take 1 tablet (650 mg total) by mouth every 8 (eight) hours as needed for mild pain  -     tiZANidine (ZANAFLEX) 2 mg tablet; Take 1 tablet (2 mg total) by mouth every 8 (eight) hours as needed for muscle spasms  -     methylPREDNISolone 4 MG tablet therapy pack; Use as directed on package  -     Ambulatory Referral to Physical Therapy; Future    5  Pre-diabetes  -     Hemoglobin A1C; Future  -     Comprehensive metabolic panel; Future    6  Vitamin D deficiency  -     Vitamin D 25 hydroxy; Future      Falls Plan of Care: Medications that increase falls were reviewed       Urinary Incontinence Plan of Care: counseling topics discussed: use restroom every 2 hours and limiting fluid intake 3-4 hours before bed  Subjective      68 YO female w/ PMH htn, lower GI bleed, chronic hepatitis C, syncope, hyperlipidemia, CKD stage 3 here for follow up of chronic conditions  Review of Systems   Constitutional: Negative for activity change, appetite change, chills, fatigue, fever and unexpected weight change  HENT: Negative for hearing loss, nosebleeds, sinus pain, sneezing, sore throat and trouble swallowing  Eyes: Negative for photophobia and visual disturbance  Respiratory: Negative for cough, chest tightness, shortness of breath and wheezing  Cardiovascular: Negative for chest pain, palpitations and leg swelling  Gastrointestinal: Negative for abdominal pain, constipation, nausea and vomiting  Genitourinary: Negative for decreased urine volume, difficulty urinating, dysuria, flank pain, genital sores, hematuria and urgency  Musculoskeletal: Positive for arthralgias and back pain  Negative for gait problem  Skin: Negative for pallor, rash and wound  Neurological: Negative for dizziness, seizures, syncope, weakness, numbness and headaches  Hematological: Negative for adenopathy  Does not bruise/bleed easily  Psychiatric/Behavioral: Negative for confusion, hallucinations, self-injury, sleep disturbance and suicidal ideas  The patient is not nervous/anxious          Current Outpatient Medications on File Prior to Visit   Medication Sig   • atorvastatin (LIPITOR) 20 mg tablet Take 1 tablet (20 mg total) by mouth daily   • bisacodyl (DULCOLAX) 5 mg EC tablet Take 2 tablets (10 mg total) by mouth once for 1 dose   • Calcium-Magnesium-Vitamin D (CALCIUM 500 PO) Take 1 tablet by mouth daily     • enalapril (VASOTEC) 20 mg tablet Take 2 tablets (40 mg total) by mouth daily   • ferrous gluconate (FERGON) 324 mg tablet Take 1 tablet by mouth 2 (two) times a day before meals for 30 days   • hydrochlorothiazide (HYDRODIURIL) 25 mg tablet Take 1 tablet (25 mg total) by mouth daily   • Multiple Vitamins-Minerals (multivitamin with minerals) tablet Take 1 tablet by mouth daily   • polyethylene glycol (GOLYTELY) 4000 mL solution Take 4,000 mL by mouth once for 1 dose   • [DISCONTINUED] omeprazole (PriLOSEC) 20 mg delayed release capsule Take 20 mg by mouth daily       Objective     /84 (BP Location: Left arm, Patient Position: Sitting, Cuff Size: Adult)   Pulse 85   Temp 97 5 °F (36 4 °C) (Temporal)   Resp 16   Ht 5' 7" (1 702 m)   Wt 68 3 kg (150 lb 8 oz)   SpO2 99%   BMI 23 57 kg/m²     Physical Exam  Vitals and nursing note reviewed  Constitutional:       General: She is not in acute distress  Appearance: She is well-developed and well-nourished  She is not diaphoretic  HENT:      Head: Normocephalic and atraumatic  Eyes:      Extraocular Movements: EOM normal       Pupils: Pupils are equal, round, and reactive to light  Cardiovascular:      Rate and Rhythm: Normal rate and regular rhythm  Pulses: Intact distal pulses  Pulmonary:      Effort: Pulmonary effort is normal  No respiratory distress  Breath sounds: Normal breath sounds  No wheezing  Abdominal:      General: Bowel sounds are normal  There is no distension  Palpations: Abdomen is soft  Tenderness: There is no abdominal tenderness  Musculoskeletal:         General: No deformity or edema  Normal range of motion  Cervical back: Normal range of motion and neck supple  Lumbar back: Tenderness present  Negative right straight leg raise test and negative left straight leg raise test         Back:    Lymphadenopathy:      Cervical: No cervical adenopathy  Skin:     General: Skin is warm and dry  Capillary Refill: Capillary refill takes less than 2 seconds  Findings: No rash  Neurological:      Mental Status: She is alert and oriented to person, place, and time     Psychiatric: Mood and Affect: Mood and affect normal          Behavior: Behavior normal        MARISSA Plummer

## 2022-12-06 ENCOUNTER — TELEPHONE (OUTPATIENT)
Dept: NEPHROLOGY | Facility: CLINIC | Age: 72
End: 2022-12-06

## 2022-12-06 LAB
EST. AVERAGE GLUCOSE BLD GHB EST-MCNC: 120 MG/DL
HBA1C MFR BLD: 5.8 %

## 2022-12-06 NOTE — ASSESSMENT & PLAN NOTE
Chronic issue with recent exacerbation   Will check imaging   Discussed non pharmacological pain interventions including: heat, ice, stretching, strengthening exercises, biofeedback, meditation, yoga, massage

## 2022-12-06 NOTE — ASSESSMENT & PLAN NOTE
Lab Results   Component Value Date    EGFR 54 12/05/2022    EGFR 49 07/28/2022    EGFR 53 11/02/2021    CREATININE 1 02 12/05/2022    CREATININE 1 11 07/28/2022    CREATININE 1 05 11/02/2021     Avoid NSAIDS, follow up with nephrology

## 2022-12-06 NOTE — TELEPHONE ENCOUNTER
Appointment Confirmation   Person confirmed appointment with  If not patient, name of the person Answering Machine    Date and time of appointment 12/6/22  1:30 PM   Patient acknowledged and will be at appointment? no    Did you advise the patient that they will need a urine sample if they are a new patient?  Yes    Did you advise the patient to bring their current medications for verification? (including any OTC) Yes    Additional Information

## 2022-12-07 ENCOUNTER — CONSULT (OUTPATIENT)
Dept: NEPHROLOGY | Facility: CLINIC | Age: 72
End: 2022-12-07

## 2022-12-07 VITALS
WEIGHT: 148 LBS | SYSTOLIC BLOOD PRESSURE: 134 MMHG | DIASTOLIC BLOOD PRESSURE: 78 MMHG | HEIGHT: 67 IN | BODY MASS INDEX: 23.23 KG/M2

## 2022-12-07 DIAGNOSIS — I12.9 BENIGN HYPERTENSION WITH CKD (CHRONIC KIDNEY DISEASE) STAGE III (HCC): Primary | ICD-10-CM

## 2022-12-07 DIAGNOSIS — N28.1 RENAL CYST: ICD-10-CM

## 2022-12-07 DIAGNOSIS — N18.31 STAGE 3A CHRONIC KIDNEY DISEASE (HCC): ICD-10-CM

## 2022-12-07 DIAGNOSIS — N18.30 BENIGN HYPERTENSION WITH CKD (CHRONIC KIDNEY DISEASE) STAGE III (HCC): Primary | ICD-10-CM

## 2022-12-07 DIAGNOSIS — E87.6 HYPOKALEMIA: ICD-10-CM

## 2022-12-07 RX ORDER — POTASSIUM CHLORIDE 20 MEQ/1
20 TABLET, EXTENDED RELEASE ORAL DAILY
Qty: 30 TABLET | Refills: 5 | Status: SHIPPED | OUTPATIENT
Start: 2022-12-07

## 2022-12-07 NOTE — PROGRESS NOTES
NEPHROLOGY OUTPATIENT CONSULTATION   Ezra Perdomo 67 y o  female MRN: [id number]  Date: 12/7/2022  Reason for consultation:   Chief Complaint   Patient presents with   • Consult   • Chronic Kidney Disease     ASSESSMENT AND PLAN:  CKD stage IIIa, baseline creatinine around 1 0 since May 2020, prior 0 7-0 8 going back to 2017  -Creatinine 1 0 this month overall stable  -CKD suspect in the setting of underlying lung, hypertension, some age-related nephron loss  -Patient was unable to provide urine sample in the office today  -UA with microscopy, U ACR in 1 month (no recent urine studies available), prior UA in 2021 showed 4-10 RBCs/WBCs, moderate bacteria  -BMP before next visit  -Prior renal ultrasound in 2021 shows right kidney 9 2 cm, left kidney 9 6 cm, bilateral renal cyst    Hypokalemia   -She has chronic intermittent hypokalemia issues suspect secondary to use of HCTZ  -Most recent serum potassium 3 3 on 12/5/2022   -Currently not on any potassium supplements, will start potassium chloride 20 mEq daily  Repeat serum potassium in 1 month    Hypertension  -Blood pressure acceptable in the office today  -Continue current regimen enalapril 40 mg daily, HCTZ 25 mg daily   -She does have upper arm BP machine although does not check BP on regular basis  Advised her to bring BP machine during next office visit   -Also check BP at home and call back if remains persistent greater than 135/85    Bilateral renal cyst, CT scan in November 2021 shows simple bilateral renal cyst   -She does have intermittent left-sided lower back pain although seems more likely musculoskeletal   She denies any urinary complaint  Currently does not have any back pain issues   -Currently remains on Medrol Dosepak, muscle relaxant by PCP      HISTORY OF PRESENT ILLNESS:  Ezra Perdomo is a 67y o  year old female with medical issues of hypertension diagnosed in 2010, hyperlipidemia, [address], hep C, status posttreatment, CKD stage III with baseline creatinine around 1 0 since May 2020, prior 0 7-0 8 going back to 2017 who presents for initial consultation for CKD  Old medical records including prior levels were reviewed from [facility] records  Patient does have very mild progression of CKD  Last serum creatinine relatively stable at recent baseline  She denies any urinary complaint  No recent NSAID use  She denies any nausea, vomiting, diarrhea  She has BP machine at home although does not check BP on regular basis  Her BP is generally acceptable at different doctors office visit as per patient  Denies any obvious history of autoimmune conditions  Denies any skin rash issues  She remains on stable antihypertensives including enalapril, HCTZ  REVIEW OF SYSTEMS:    More than 10 point review of systems were obtained and discussed in length with the patient  Complete review of systems were negative / unremarkable except mentioned in the HPI section  PHYSICAL EXAM:  Vitals:    12/07/22 1302 12/07/22 1329   BP:  134/78   Weight: 67 1 kg (148 lb)    Height: 5' 7" (1 702 m)      Body mass index is 23 18 kg/m²  Physical Exam  Vitals reviewed  Constitutional:       Appearance: She is well-developed  HENT:      Head: Normocephalic and atraumatic  Right Ear: External ear normal       Left Ear: External ear normal    Eyes:      Conjunctiva/sclera: Conjunctivae normal    Cardiovascular:      Comments: S1, S2 present  Pulmonary:      Effort: Pulmonary effort is normal       Breath sounds: Normal breath sounds  No wheezing or rales  Abdominal:      General: Bowel sounds are normal  There is no distension  Palpations: Abdomen is soft  Tenderness: There is no abdominal tenderness  Musculoskeletal:         General: No deformity  Lymphadenopathy:      Cervical: No cervical adenopathy  Skin:     Findings: No rash  Neurological:      Mental Status: She is alert and oriented to person, place, and time  Psychiatric:         Behavior: Behavior normal          PAST MEDICAL HISTORY:  Past Medical History:   Diagnosis Date   • Arthritis    • Diverticulosis of colon    • Hx of bleeding disorder     rectal bleeding   • Hypertension        PAST SURGICAL HISTORY:  Past Surgical History:   Procedure Laterality Date   • COLONOSCOPY N/A 3/28/2017    Procedure: COLONOSCOPY;  Surgeon: Rand Dickens MD;  Location: [facility]; Service:    • COLONOSCOPY N/A 3/26/2017    Procedure: COLONOSCOPY with hemostasis clip placement;  Surgeon: Mary Knight MD;  Location: [facility]; Service:    • COLONOSCOPY N/A 1/27/2017    Procedure: COLONOSCOPY;  Surgeon: Sincere Anderson MD;  Location: [facility]; Service:    • COLONOSCOPY N/A 4/7/2017    Procedure: COLONOSCOPY;  Surgeon: Mary Knight MD;  Location: [facility]; Service:    • ESOPHAGOGASTRODUODENOSCOPY N/A 3/27/2017    Procedure: ESOPHAGOGASTRODUODENOSCOPY (EGD) with APC; Surgeon: Rand Dickens MD;  Location: [facility]; Service:    • WA SIGMOIDOSCOPY FLX DX W/COLLJ SPEC BR/WA IF PFRMD N/A 3/27/2017    Procedure: Jarred Garcia;  Surgeon: Rand Dickens MD;  Location: [facility];   Service: Gastroenterology   • TUBAL LIGATION         ALLERGIES:  No Known Allergies    SOCIAL HISTORY:  Social History     Substance and Sexual Activity   Alcohol Use No     Social History     Substance and Sexual Activity   Drug Use No     Social History     Tobacco Use   Smoking Status Never   Smokeless Tobacco Never       FAMILY HISTORY:  Family History   Problem Relation Age of Onset   • Breast cancer Mother 61   • No Known Problems Father    • No Known Problems Sister    • No Known Problems Sister    • No Known Problems Sister    • Breast cancer Daughter 52   • No Known Problems Maternal Grandmother    • No Known Problems Maternal Grandfather    • No Known Problems Paternal Grandmother    • No Known Problems Paternal Grandfather    • No Known Problems Paternal Aunt        MEDICATIONS:    Current Outpatient Medications:   •  atorvastatin (LIPITOR) 20 mg tablet, Take 1 tablet (20 mg total) by mouth daily, Disp: 90 tablet, Rfl: 1  •  enalapril (VASOTEC) 20 mg tablet, Take 2 tablets (40 mg total) by mouth daily, Disp: 180 tablet, Rfl: 1  •  ferrous gluconate (FERGON) 324 mg tablet, Take 1 tablet by mouth 2 (two) times a day before meals for 30 days, Disp: 60 tablet, Rfl: 0  •  hydrochlorothiazide (HYDRODIURIL) 25 mg tablet, Take 1 tablet (25 mg total) by mouth daily, Disp: 90 tablet, Rfl: 1  •  methylPREDNISolone 4 MG tablet therapy pack, Use as directed on package, Disp: 21 each, Rfl: 0  •  potassium chloride (K-DUR,KLOR-CON) 20 mEq tablet, Take 1 tablet (20 mEq total) by mouth daily, Disp: 30 tablet, Rfl: 5  •  tiZANidine (ZANAFLEX) 2 mg tablet, Take 1 tablet (2 mg total) by mouth every 8 (eight) hours as needed for muscle spasms, Disp: 40 tablet, Rfl: 0  •  acetaminophen (TYLENOL) 650 mg CR tablet, Take 1 tablet (650 mg total) by mouth every 8 (eight) hours as needed for mild pain (Patient not taking: Reported on 12/7/2022), Disp: 30 tablet, Rfl: 0  •  bisacodyl (DULCOLAX) 5 mg EC tablet, Take 2 tablets (10 mg total) by mouth once for 1 dose (Patient not taking: Reported on 12/7/2022), Disp: 2 tablet, Rfl: 0  •  polyethylene glycol (GOLYTELY) 4000 mL solution, Take 4,000 mL by mouth once for 1 dose (Patient not taking: Reported on 12/7/2022), Disp: 4000 mL, Rfl: 0    Lab Results:   Results for orders placed or performed in visit on 12/05/22   Hemoglobin A1C   Result Value Ref Range    Hemoglobin A1C 5 8 (H) Normal 3 8-5 6%; PreDiabetic 5 7-6 4%;  Diabetic >=6 5%; Glycemic control for adults with diabetes <7 0% %     mg/dl   Comprehensive metabolic panel   Result Value Ref Range    Sodium 139 135 - 147 mmol/L    Potassium 3 3 (L) 3 5 - 5 3 mmol/L    Chloride 101 96 - 108 mmol/L    CO2 31 21 - 32 mmol/L    ANION GAP 7 5 - 14 mmol/L    BUN 18 5 - 25 mg/dL    Creatinine 1 02 0 60 - 1 20 mg/dL    Glucose, Fasting 115 (H) 70 - 99 mg/dL    Calcium 9 9 8 4 - 10 2 mg/dL    AST 24 14 - 36 U/L    ALT 21 <35 U/L    Alkaline Phosphatase 126 (H) 43 - 122 U/L    Total Protein 8 7 (H) 6 4 - 8 4 g/dL    Albumin 4 7 3 5 - 5 0 g/dL    Total Bilirubin 0 57 0 20 - 1 00 mg/dL    eGFR 54 ml/min/1 73sq m   CBC and differential   Result Value Ref Range    WBC 10 63 (H) 4 31 - 10 16 Thousand/uL    RBC 4 30 3 81 - 5 12 Million/uL    Hemoglobin 14 0 11 5 - 15 4 g/dL    Hematocrit 41 6 34 8 - 46 1 %    MCV 97 82 - 98 fL    MCH 32 6 26 8 - 34 3 pg    MCHC 33 7 31 4 - 37 4 g/dL    RDW 12 6 11 6 - 15 1 %    MPV 11 3 8 9 - 12 7 fL    Platelets 530 439 - 770 Thousands/uL    nRBC 0 /100 WBCs    Neutrophils Relative 69 43 - 75 %    Immat GRANS % 0 0 - 2 %    Lymphocytes Relative 23 14 - 44 %    Monocytes Relative 6 4 - 12 %    Eosinophils Relative 1 0 - 6 %    Basophils Relative 1 0 - 1 %    Neutrophils Absolute 7 41 1 85 - 7 62 Thousands/µL    Immature Grans Absolute 0 04 0 00 - 0 20 Thousand/uL    Lymphocytes Absolute 2 45 0 60 - 4 47 Thousands/µL    Monocytes Absolute 0 59 0 17 - 1 22 Thousand/µL    Eosinophils Absolute 0 08 0 00 - 0 61 Thousand/µL    Basophils Absolute 0 06 0 00 - 0 10 Thousands/µL   Vitamin D 25 hydroxy   Result Value Ref Range    Vit D, 25-Hydroxy 67 6 30 0 - 100 0 ng/mL       Portions of the record may have been created with voice recognition software  Occasional wrong word or "sound a like" substitutions may have occurred due to the inherent limitations of voice recognition software  Read the chart carefully and recognize, using context, where substitutions have occurred

## 2022-12-09 ENCOUNTER — TELEPHONE (OUTPATIENT)
Dept: NEPHROLOGY | Facility: CLINIC | Age: 72
End: 2022-12-09

## 2022-12-09 NOTE — TELEPHONE ENCOUNTER
Spoke with Patient and schedule appointment for 6/1/23 1:00 PM with Dr Shirin Benítez in the [facility]

## 2022-12-20 ENCOUNTER — PATIENT MESSAGE (OUTPATIENT)
Dept: GASTROENTEROLOGY | Facility: MEDICAL CENTER | Age: 72
End: 2022-12-20

## 2022-12-27 NOTE — PATIENT COMMUNICATION
[facility] to inform Dr Jinny Mills not available 2/17/23 when she was rescheduled       I rescheduled her to Monday 2/20/23 with Dr Jinny Mills @ [address] [place] [address]

## 2022-12-30 DIAGNOSIS — E87.6 HYPOKALEMIA: ICD-10-CM

## 2022-12-30 RX ORDER — POTASSIUM CHLORIDE 1500 MG/1
TABLET, EXTENDED RELEASE ORAL
Qty: 90 TABLET | Refills: 2 | Status: SHIPPED | OUTPATIENT
Start: 2022-12-30

## 2023-01-05 ENCOUNTER — EVALUATION (OUTPATIENT)
Dept: PHYSICAL THERAPY | Facility: REHABILITATION | Age: 73
End: 2023-01-05

## 2023-01-05 DIAGNOSIS — M54.42 ACUTE LEFT-SIDED LOW BACK PAIN WITH LEFT-SIDED SCIATICA: ICD-10-CM

## 2023-01-05 NOTE — PROGRESS NOTES
PT Evaluation     Today's date: 2023  Patient name: Derl Mcburney  :   MRN: [id number]  Referring provider: MARISSA Walls  Dx:   Encounter Diagnosis     ICD-10-CM    1  Acute left-sided low back pain with left-sided sciatica  M54 42 Ambulatory Referral to Physical Therapy          Start Time: 1445  Stop Time: 1527  Total time in clinic (min): 42 minutes    Assessment  Assessment details: Patient is a 67 y o  female presenting to initial examination with chief complaint of low back pain  Signs and symptoms are consistent with low back pain with core stability deficits  Primary impairments include core stability deficits and hip abduction and extension strength deficits  As a result of impairments patient experiences limitations with functional/daily activities including lifting heavy objects and prolonged positioning such as standing, sitting, and lying  Educated patient regarding plan of care and answered all patient questions to patient satisfaction  Patient would benefit from skilled PT interventions to address above impairments, achieve goals, and to maximize function   Thank you for the referral     Impairments: abnormal muscle firing, abnormal or restricted ROM, abnormal movement, activity intolerance, impaired physical strength and pain with function     Prognosis: good    Goals  Impairment Goals: 4-6 weeks  - Patient to decrease pain to 0/10  - Patient to increase hip strength to 4/5 throughout  - Patient to demonstrate proper core activation and body mechanics during functional activities    Functional Goals: by discharge  - Patient to discharge to independent [facility]  - Patient to return to prior level of function  - Patient to tolerate prolonged positioning without increased pain or difficulty  - Patient to tolerate lifting without increased pain or difficulty      Plan  Patient would benefit from: skilled physical therapy  Planned modality interventions: cryotherapy, TENS and THC Physician - Brief Progress [id number]/22/2020 03:36[facility]
diego [place], [place] - JARROD (FARZANA) - FLOWER GUDate of Service 02/22/2020 03:36HPI/Events of
 Note  ml bolus once. watch for fluid overload. received lopressor just now.Interventions Inter
mediate-Hypotension - evaluation and managementElectronically Signed by: TAMIA LAND) on 02/22/
2020 03:40 thermotherapy: hydrocollator packs  Planned therapy interventions: flexibility, home exercise program, joint mobilization, manual therapy, neuromuscular re-education, patient education, strengthening, stretching, therapeutic activities, therapeutic exercise and functional ROM exercises  Frequency: 2x week  Duration in weeks: 4  Treatment plan discussed with: patient        Subjective Evaluation    History of Present Illness  Mechanism of injury: HISTORY OF PRESENT ILLNESS: Patient notes onset of low back pain just before Thanksgiving with no ASTRID  Pain is localized to L-sided lumbar spine radiating to lateral hip to region of greater trochanter  Patient has history of numbness and tingling in L foot which has been chronic due to footwear  No new sensory changes or B/B changes  Pain has been improving with time and has been much better over the past couple weeks  PRIOR TREATMENT: none  AGGRAVATING FACTORS: prolonged lying, prolonged sitting, prolonged standing  EASING FACTORS: Tylenol, change of position  WORK: retired  IMAGING: x-rays notable for mild to moderate lumbar OA  FUNCTIONAL LIMITATIONS: standing, prolonged lying, prolonged sitting, lifting  SUBJECTIVE FUNCTIONAL LEVEL: unable to report  PATIENT GOAL: to get rid of the pain    Pain  Current pain ratin  At best pain ratin  At worst pain ratin          Objective     Palpation     Additional Palpation Details  No TTP elicited    Neurological Testing     Sensation     Lumbar   Left   Intact: light touch    Right   Intact: light touch    Active Range of Motion     Lumbar   Flexion:  Restriction level: minimal  Extension:  Restriction level: moderate  Left lateral flexion:  WFL  Right lateral flexion:  WFL  Left rotation:  WFL  Right rotation:  [facility]    Additional Active Range of Motion Details  No familiar symptom reproduction with lumbar AROM    Passive Range of Motion   Left Hip   Flexion: [facility]  External rotation (90/90):  [facility]  Internal rotation (90/90): WFL    Right Hip   Flexion: [facility] PEMBR[facility]-locate  External rotation (90/90): [facility]EM[facility]-locate  [facility] ([facility]/90): [facility]    Joint Play   Joints within functional limits: T12, L1, L2, L3, L4 and L5   Mechanical Assessment    Cervical      Thoracic      Lumbar    Standing flexion: repeated movements   Pain location:no change  Standing extension: repeated movements  Pain location: no change    Strength/Myotome Testing     Left Hip   Planes of Motion   Flexion: 4+  Extension: 3+  Abduction: 3+    Right Hip   Planes of Motion   Flexion: 4+  Extension: 3+  Abduction: 3+    Left Knee   Extension: 4+    Right Knee   Extension: 4+    Left Ankle/Foot   Dorsiflexion: 5  Eversion: 5  Great toe extension: 5    Right Ankle/Foot   Dorsiflexion: 5  Eversion: 5  Great toe extension: 5    Additional Strength Details  L SLR: 3+  R SLR: 3+    Tests     Lumbar     Left   Negative passive SLR and slump test      Right   Negative passive SLR and slump test      Left Pelvic Girdle/Sacrum   Negative: thigh thrust and active SLR test      Right Pelvic Girdle/Sacrum   Negative: thigh thrust and active SLR test      Left Hip   Negative JUAN and FADIR  Right Hip   Negative JUAN and FADIR       Additional Tests Details  (-) scour             Diagnosis: low back pain with core stability deficits   Precautions: HTN   Primary impairments: core stability deficits, hip abduction and extension strength deficits   *asterisks by exercise = given for HEP    1/5       Manuals                                                There Ex        Rec bike                                                                        Neuro Re-Ed        Supine core brace        Supine core brace with alt marches/BKFO        S/L hip abd *  x 10 B       S/L hip circles        Prone hip ext        Quadruped alt shoulder flex        Quadruped alt hip ext        Sidestepping        SLS                                                Re-evaluation             Ther Act/Gait Modalities

## 2023-01-12 ENCOUNTER — OFFICE VISIT (OUTPATIENT)
Dept: PHYSICAL THERAPY | Facility: REHABILITATION | Age: 73
End: 2023-01-12

## 2023-01-12 DIAGNOSIS — M54.42 ACUTE LEFT-SIDED LOW BACK PAIN WITH LEFT-SIDED SCIATICA: Primary | ICD-10-CM

## 2023-01-12 NOTE — PROGRESS NOTES
Daily Note     Today's date: 2023  Patient name: Ibrahima Bergeron  :   MRN: [id number]  Referring provider: MARISSA Watson  Dx:   Encounter Diagnosis     ICD-10-CM    1  Acute left-sided low back pain with left-sided sciatica  M54 42           Start Time: 1435  Stop Time: 1520  Total time in clinic (min): 45 minutes    Subjective: Patient notes she did okay with initial HEP  She feels okay overall      Objective: See treatment diary below      Assessment: Minor cueing required to maintain appropriate hip alignment during sidelying hip abduction  Patient became fatigued throughout core and hip stability exercises  L hip extension strength deficits noted compared to R  Patient demonstrated fatigue post treatment and would benefit from continued PT      Plan: Continue per plan of care        Diagnosis: low back pain with core stability deficits   Precautions: HTN   Primary impairments: core stability deficits, hip abduction and extension strength deficits   *asterisks by exercise = given for HEP          Manuals                                                There Ex        Rec bike   L1 x 8'                                                                      Neuro Re-Ed        Supine core brace   5" x 10      Supine core brace with alt marches/BKFO   x 10 ea      Supine core brace with SLR   x 10 B      Bridges   x 10      S/L hip abd *  x 10 B  x 10 B      S/L hip circles   NV      Prone hip ext   x 10 B      Quadruped alt shoulder flex   x 10 B      Quadruped alt hip ext   x 10 B      Sidestepping   x 2 laps      SLS                                                Re-evaluation             Ther Act/Gait                                         Modalities

## 2023-01-13 ENCOUNTER — APPOINTMENT (OUTPATIENT)
Dept: PHYSICAL THERAPY | Facility: REHABILITATION | Age: 73
End: 2023-01-13

## 2023-01-16 ENCOUNTER — OFFICE VISIT (OUTPATIENT)
Dept: PHYSICAL THERAPY | Facility: REHABILITATION | Age: 73
End: 2023-01-16

## 2023-01-16 DIAGNOSIS — M54.42 ACUTE LEFT-SIDED LOW BACK PAIN WITH LEFT-SIDED SCIATICA: Primary | ICD-10-CM

## 2023-01-16 NOTE — PROGRESS NOTES
Daily Note     Today's date: 2023  Patient name: Alejandra Wong  : [id number]  MRN: [id number]  Referring provider: MARISSA Medeiros  Dx:   Encounter Diagnosis     ICD-10-CM    1  Acute left-sided low back pain with left-sided sciatica  M54 42           Start Time: 1535  Stop Time: 1615  Total time in clinic (min): 40 minutes    Subjective: Patient notes she felt okay after last visit with minimal soreness      Objective: See treatment diary below      Assessment: Tolerated treatment well however rest periods required due to muscle fatigue throughout core and hip strengthening  Cueing to maintain appropriate setup position during sidelying hip abduction in order to minimize hip flexor compensation  Patient demonstrated fatigue post treatment, exhibited good technique with therapeutic exercises and would benefit from continued PT      Plan: Continue per plan of care        Diagnosis: low back pain with core stability deficits   Precautions: HTN   Primary impairments: core stability deficits, hip abduction and extension strength deficits   *asterisks by exercise = given for HEP         Manuals                                                There Ex        Rec bike   L1 x 8'  L0 x 6'                                                                     Neuro Re-Ed        Supine core brace   5" x 10  5" x 10     Supine core brace with alt marches/BKFO   x 10 ea  x 15 ea     Supine core brace with SLR   x 10 B  x 10 B     Bridges   x 10  x 15     S/L hip abd *  x 10 B  x 10 B  x 12 B     S/L hip circles   NV  x 10 cw/ccw B     Prone hip ext   x 10 B  x 12 B     Quadruped alt shoulder flex   x 10 B  x 10 B     Quadruped alt hip ext   x 10 B  x 10 B     Sidestepping   x 2 laps  x 2 laps     SLS                                                Re-evaluation             Ther Act/Gait                                         Modalities

## 2023-01-18 ENCOUNTER — OFFICE VISIT (OUTPATIENT)
Dept: PHYSICAL THERAPY | Facility: REHABILITATION | Age: 73
End: 2023-01-18

## 2023-01-18 DIAGNOSIS — M54.42 ACUTE LEFT-SIDED LOW BACK PAIN WITH LEFT-SIDED SCIATICA: Primary | ICD-10-CM

## 2023-01-18 NOTE — PROGRESS NOTES
Daily Note     Today's date: 2023  Patient name: Mena Blum  :   MRN: [id number]  Referring provider: MARISSA Ko  Dx:   Encounter Diagnosis     ICD-10-CM    1  Acute left-sided low back pain with left-sided sciatica  M54 42           Start Time: 1357  Stop Time: 1442  Total time in clinic (min): 45 minutes    Subjective: Patient was tired after last visit and she admits she did not complete her HEP since      Objective: See treatment diary below      Assessment: Patient challenged with sidelying hip abduction bilaterally due to gluteus medius strength deficits  Patient responded well to unilateral leg press  Frequent rest periods required due to fatigue  Patient demonstrated fatigue post treatment, exhibited good technique with therapeutic exercises and would benefit from continued PT      Plan: Continue per plan of care        Diagnosis: low back pain with core stability deficits   Precautions: HTN   Primary impairments: core stability deficits, hip abduction and extension strength deficits   *asterisks by exercise = given for HEP        Manuals                                                There Ex        Rec bike   L1 x 8'  L0 x 6'  L1 x 8'                                                                    Neuro Re-Ed        Supine core brace   5" x 10  5" x 10  D/C    Supine core brace with alt marches/BKFO   x 10 ea  x 15 ea  D/C    Supine core brace with SLR   x 10 B  x 10 B  x 12 B    Bridges   x 10  x 15  x 15    S/L hip abd *  x 10 B  x 10 B  x 12 B  x 12 B    S/L hip circles   NV  x 10 cw/ccw B  x 10 cw/ccw    Prone hip ext   x 10 B  x 12 B  x 12 B    Quadruped alt shoulder flex   x 10 B  x 10 B  x 10 B    Quadruped alt hip ext   x 10 B  x 10 B  x 10 B    Sidestepping   x 2 laps  x 2 laps  x 2 laps    SLS     10" x 3 B    U/L leg press     35 lbs x 10 B                                    Re-evaluation             Ther Act/Gait Modalities

## 2023-01-23 ENCOUNTER — OFFICE VISIT (OUTPATIENT)
Dept: PHYSICAL THERAPY | Facility: REHABILITATION | Age: 73
End: 2023-01-23

## 2023-01-23 DIAGNOSIS — M54.42 ACUTE LEFT-SIDED LOW BACK PAIN WITH LEFT-SIDED SCIATICA: Primary | ICD-10-CM

## 2023-01-23 NOTE — PROGRESS NOTES
Daily Note     Today's date: 2023  Patient name: Bambi Bray  :   MRN: [id number]  Referring provider: MARISSA Funes  Dx:   Encounter Diagnosis     ICD-10-CM    1  Acute left-sided low back pain with left-sided sciatica  M54 42           Start Time: 1355  Stop Time: 1440  Total time in clinic (min): 45 minutes    Subjective: Patient notes she felt fine after her last visit and denies any significant soreness      Objective: See treatment diary below      Assessment: Patient has responded well to core stability exercises  Patient challenged maintaining neutral spine with quadruped hip extension  Frequent rest periods required due to fatigue  Patient demonstrated fatigue post treatment, exhibited good technique with therapeutic exercises and would benefit from continued PT      Plan: Continue per plan of care        Diagnosis: low back pain with core stability deficits   Precautions: HTN   Primary impairments: core stability deficits, hip abduction and extension strength deficits   *asterisks by exercise = given for HEP       Manuals                                                There Ex        Rec bike   L1 x 8'  L0 x 6'  L1 x 8'  L1 x 8'                                                                   Neuro Re-Ed        Supine core brace   5" x 10  5" x 10  D/C    Supine core brace with alt marches/BKFO   x 10 ea  x 15 ea  D/C    Supine core brace with SLR   x 10 B  x 10 B  x 12 B  x 12 B   Bridges *   x 10  x 15  x 15  x 15   S/L hip abd *  x 10 B  x 10 B  x 12 B  x 12 B  x 12 B   S/L hip circles   NV  x 10 cw/ccw B  x 10 cw/ccw  x 10 cw/ccw   Prone hip ext   x 10 B  x 12 B  x 12 B  x 12 B   Quadruped alt shoulder flex   x 10 B  x 10 B  x 10 B  x 10 B   Quadruped alt hip ext   x 10 B  x 10 B  x 10 B  x 10 B   Sidestepping   x 2 laps  x 2 laps  x 2 laps  x 2 laps   SLS     10" x 3 B  10" x 3 B   U/L leg press     35 lbs x 10 B  35 lbs x 15 B Re-evaluation             Ther Act/Gait                                         Modalities

## 2023-01-26 ENCOUNTER — OFFICE VISIT (OUTPATIENT)
Dept: PHYSICAL THERAPY | Facility: REHABILITATION | Age: 73
End: 2023-01-26

## 2023-01-26 DIAGNOSIS — M54.42 ACUTE LEFT-SIDED LOW BACK PAIN WITH LEFT-SIDED SCIATICA: Primary | ICD-10-CM

## 2023-01-26 NOTE — PROGRESS NOTES
Daily Note     Today's date: 2023  Patient name: Dakotah Escamilla  :   MRN: [id number]  Referring provider: MARISSA Jordan  Dx:   Encounter Diagnosis     ICD-10-CM    1  Acute left-sided low back pain with left-sided sciatica  M54 42           Start Time: 1445  Stop Time: 1530  Total time in clinic (min): 45 minutes    Subjective: Patient notes she felt okay after last visit  She completed her sidelying abduction at home but did not do the bridges      Objective: See treatment diary below      Assessment: Initiated slow sit to stand with slight UE support required  Updated HEP to include sit to stand and provided illustrated handout  Plan to reassess next week and consider discharge to independent HEP if appropriate  Patient demonstrated fatigue post treatment, exhibited good technique with therapeutic exercises and would benefit from continued PT      Plan: Continue per plan of care        Diagnosis: low back pain with core stability deficits   Precautions: HTN   Primary impairments: core stability deficits, hip abduction and extension strength deficits   *asterisks by exercise = given for HEP       Manuals                                                There Ex        Rec bike  L1 x 8'  L1 x 8'  L0 x 6'  L1 x 8'  L1 x 8'                                                                   Neuro Re-Ed        Supine core brace   5" x 10  5" x 10  D/C    Supine core brace with alt marches/BKFO   x 10 ea  x 15 ea  D/C    Supine core brace with SLR  x 15 B  x 10 B  x 10 B  x 12 B  x 12 B   Bridges *  x 15  x 10  x 15  x 15  x 15   S/L hip abd *  x 15 B  x 10 B  x 12 B  x 12 B  x 12 B   S/L hip circles   NV  x 10 cw/ccw B  x 10 cw/ccw  x 10 cw/ccw   Prone hip ext   x 10 B  x 12 B  x 12 B  x 12 B   Quadruped alt shoulder flex   x 10 B  x 10 B  x 10 B  x 10 B   Quadruped alt hip ext   x 10 B  x 10 B  x 10 B  x 10 B   Sidestepping  x 2 laps  x 2 laps  x 2 laps  x 2 laps  x 2 laps SLS  NP    10" x 3 B  10" x 3 B   U/L leg press  35 lbs x 15 B    35 lbs x 10 B  35 lbs x 15 B   Sit to stand slow *  x 15                               Re-evaluation            Ther Act/Gait                                      Modalities

## 2023-01-29 DIAGNOSIS — I10 ESSENTIAL HYPERTENSION: ICD-10-CM

## 2023-01-30 ENCOUNTER — OFFICE VISIT (OUTPATIENT)
Dept: PHYSICAL THERAPY | Facility: REHABILITATION | Age: 73
End: 2023-01-30

## 2023-01-30 DIAGNOSIS — M54.42 ACUTE LEFT-SIDED LOW BACK PAIN WITH LEFT-SIDED SCIATICA: Primary | ICD-10-CM

## 2023-01-30 RX ORDER — HYDROCHLOROTHIAZIDE 25 MG/1
25 TABLET ORAL DAILY
Qty: 90 TABLET | Refills: 1 | Status: SHIPPED | OUTPATIENT
Start: 2023-01-30

## 2023-01-30 NOTE — PROGRESS NOTES
Daily Note     Today's date: 2023  Patient name: Aries Bragg  :   MRN: [id number]  Referring provider: MARISSA Panchal  Dx:   Encounter Diagnosis     ICD-10-CM    1  Acute left-sided low back pain with left-sided sciatica  M54 42           Start Time: 1450  Stop Time: 1530  Total time in clinic (min): 40 minutes    Subjective: Patient notes she is tired on arrival  She felt okay after her last visit      Objective: See treatment diary below      Assessment: Frequent rest periods required due to fatigue  Patient demonstrated fatigue post treatment, exhibited good technique with therapeutic exercises and would benefit from continued PT  Plan to reassess next visit and consider discharge to independent [facility] if appropriate      Plan: Continue per plan of care        Diagnosis: low back pain with core stability deficits   Precautions: HTN   Primary impairments: core stability deficits, hip abduction and extension strength deficits   *asterisks by exercise = given for HEP       Manuals                                                There Ex        Rec bike  L1 x 8'  L1 x 8'  L0 x 6'  L1 x 8'  L1 x 8'                                                                   Neuro Re-Ed        Supine core brace    5" x 10  D/C    Supine core brace with alt marches/BKFO    x 15 ea  D/C    Supine core brace with SLR  x 15 B  x 12 B  x 10 B  x 12 B  x 12 B   Bridges *  x 15   x 15  x 15  x 15   S/L hip abd *  x 15 B   x 12 B  x 12 B  x 12 B   S/L hip circles    x 10 cw/ccw B  x 10 cw/ccw  x 10 cw/ccw   Prone hip ext    x 12 B  x 12 B  x 12 B   Quadruped alt shoulder flex    x 10 B  x 10 B  x 10 B   Quadruped alt hip ext    x 10 B  x 10 B  x 10 B   Sidestepping  x 2 laps  x 2 laps  x 2 laps  x 2 laps  x 2 laps   SLS  NP    10" x 3 B  10" x 3 B   U/L leg press  35 lbs x 15 B  35 lbs x 20 B   35 lbs x 10 B  35 lbs x 15 B   Sit to stand slow *  x 15  x 15 Re-evaluation           Ther Act/Gait                                   Modalities

## 2023-02-01 ENCOUNTER — EVALUATION (OUTPATIENT)
Dept: PHYSICAL THERAPY | Facility: REHABILITATION | Age: 73
End: 2023-02-01

## 2023-02-01 DIAGNOSIS — M54.42 ACUTE LEFT-SIDED LOW BACK PAIN WITH LEFT-SIDED SCIATICA: Primary | ICD-10-CM

## 2023-02-01 NOTE — PROGRESS NOTES
PT Discharge    Today's date: 2023  Patient name: Freida Espino  :   MRN: [id number]  Referring provider: MARISSA Godinez  Dx:   Encounter Diagnosis     ICD-10-CM    1  Acute left-sided low back pain with left-sided sciatica  M54 42           Start Time: 1357  Stop Time: 1439  Total time in clinic (min): 42 minutes    Assessment  Assessment details: Patient is a 67 y o  female presenting to reexamination with chief complaint of low back pain  Patient has been compliant with physical therapy and has achieved functional goals at this time  Patient exhibits functional improvements including static standing, prolonged lying, and prolonged sitting  Slight improvements in hip abduction and extension strength noted since beginning interventions  Updated HEP with emphasis on core and hip strengthening  Educated patient regarding importance of continued completion of HEP for symptom management  Patient is discharged to independent [facility]  Reviewed HEP, educated patient regarding discharge plan, and answered all patient questions to satisfaction  Good prognosis  Prognosis: good    Goals  Impairment Goals: 4-6 weeks  - Patient to decrease pain to 0/10 - MET  - Patient to increase hip strength to 4/5 throughout - MOSTLY MET  - Patient to demonstrate proper core activation and body mechanics during functional activities - MET    Functional Goals: by discharge  - Patient to discharge to independent HEP - MET  - Patient to return to prior level of function - MET  - Patient to tolerate prolonged positioning without increased pain or difficulty - MET  - Patient to tolerate lifting without increased pain or difficulty - NOT MET      Plan  Plan details: Discharge to independent [facility]  Treatment plan discussed with: patient        Subjective Evaluation    History of Present Illness  Mechanism of injury: HISTORY OF PRESENT ILLNESS: Patient notes she feels stronger now than she did a month ago   She no longer has pain upon awakening and transferring out of bed  Patient remains apprehensive with lifting heavy objects due to a combination of her shoulder and her back  PRIOR TREATMENT: none  AGGRAVATING FACTORS: none  EASING FACTORS: Tylenol, change of position  WORK: retired  IMAGING: x-rays notable for mild to moderate lumbar OA  FUNCTIONAL LIMITATIONS: heavy lifting  IMPROVEMENTS: static standing, prolonged lying, prolonged sitting  SUBJECTIVE FUNCTIONAL LEVEL: 100%  PATIENT GOAL: to get rid of the pain - MET    Pain  Current pain ratin  At best pain ratin  At worst pain ratin          Objective     Palpation     Additional Palpation Details  No TTP elicited    Neurological Testing     Sensation     Lumbar   Left   Intact: light touch    Right   Intact: light touch    Active Range of Motion     Lumbar   Flexion:  Restriction level: minimal  Extension:  Restriction level: moderate  Left lateral flexion:  WFL  Right lateral flexion:  WFL  Left rotation:  WFL  Right rotation:  Restriction level: minimal    Additional Active Range of Motion Details  No familiar symptom reproduction with lumbar AROM    Passive Range of Motion   Left Hip   Flexion: [facility]  External rotation (90/90): [facility]  Internal rotation (90/90): WFL    Right Hip   Flexion: [facility]  External rotation (90/90): [facility]  Internal rotation (90/90):  [facility]    Strength/Myotome Testing     Left Hip   Planes of Motion   Flexion: 4+  Extension: 4-  Abduction: 4+    Right Hip   Planes of Motion   Flexion: 4+  Extension: 4-  Abduction: 4    Left Knee   Extension: 5    Right Knee   Extension: 5    Left Ankle/Foot   Dorsiflexion: 5  Eversion: 5  Great toe extension: 5    Right Ankle/Foot   Dorsiflexion: 5  Eversion: 5  Great toe extension: 5    Additional Strength Details  L SLR: 4  R SLR: 4      Flowsheet Rows    Flowsheet Row Most Recent Value   PT/OT G-Codes    Current Score 76   Projected Score 70             Diagnosis: low back pain with core stability deficits Precautions: HTN   Primary impairments: core stability deficits, hip abduction and extension strength deficits   *asterisks by exercise = given for HEP    1/26 1/30 2/1 1/18 1/23   Manuals                                                There Ex        Rec bike  L1 x 8'  L1 x 8'  L1 x 8'  L1 x 8'  L1 x 8'                                                                   Neuro Re-Ed        Supine core brace     D/C    Supine core brace with alt marches/BKFO     D/C    Supine core brace with SLR  x 15 B  x 12 B   x 12 B  x 12 B   Bridges *  x 15    x 15  x 15   S/L hip abd *  x 15 B    x 12 B  x 12 B   S/L hip circles     x 10 cw/ccw  x 10 cw/ccw   Prone hip ext     x 12 B  x 12 B   Quadruped alt shoulder flex     x 10 B  x 10 B   Quadruped alt hip ext     x 10 B  x 10 B   Sidestepping  x 2 laps  x 2 laps   x 2 laps  x 2 laps   SLS  NP    10" x 3 B  10" x 3 B   U/L leg press  35 lbs x 15 B  35 lbs x 20 B   35 lbs x 10 B  35 lbs x 15 B   Sit to stand slow *  x 15  x 15                              Re-evaluation    CM       Ther Act/Gait                                  Modalities

## 2023-02-13 ENCOUNTER — TELEPHONE (OUTPATIENT)
Dept: GASTROENTEROLOGY | Facility: MEDICAL CENTER | Age: 73
End: 2023-02-13

## 2023-02-16 ENCOUNTER — TELEPHONE (OUTPATIENT)
Dept: GASTROENTEROLOGY | Facility: MEDICAL CENTER | Age: 73
End: 2023-02-16

## 2023-02-16 RX ORDER — SODIUM CHLORIDE 9 MG/ML
125 INJECTION, SOLUTION INTRAVENOUS CONTINUOUS
OUTPATIENT
Start: 2023-02-16

## 2023-02-17 ENCOUNTER — TELEPHONE (OUTPATIENT)
Dept: GASTROENTEROLOGY | Facility: MEDICAL CENTER | Age: 73
End: 2023-02-17

## 2023-02-17 NOTE — TELEPHONE ENCOUNTER
Scheduled date of colonoscopy & EGD (as of today) 05/04/2023  Physician performing: Dr Cecilio Alanis  [place]   Instructions given to patient:  benjamin  Clearances: n/a    Patient cancel she says she forgot about procedure, she also says she has instructions already

## 2023-02-28 DIAGNOSIS — E78.5 HYPERLIPIDEMIA, UNSPECIFIED HYPERLIPIDEMIA TYPE: ICD-10-CM

## 2023-02-28 RX ORDER — ATORVASTATIN CALCIUM 20 MG/1
TABLET, FILM COATED ORAL
Qty: 90 TABLET | Refills: 1 | Status: SHIPPED | OUTPATIENT
Start: 2023-02-28

## 2023-04-25 ENCOUNTER — APPOINTMENT (OUTPATIENT)
Dept: LAB | Facility: HOSPITAL | Age: 73
End: 2023-04-25

## 2023-04-25 ENCOUNTER — TELEPHONE (OUTPATIENT)
Dept: OTHER | Facility: HOSPITAL | Age: 73
End: 2023-04-25

## 2023-04-25 DIAGNOSIS — E87.6 HYPOKALEMIA: ICD-10-CM

## 2023-04-25 DIAGNOSIS — I12.9 BENIGN HYPERTENSION WITH CKD (CHRONIC KIDNEY DISEASE) STAGE III (HCC): Primary | ICD-10-CM

## 2023-04-25 DIAGNOSIS — N18.31 STAGE 3A CHRONIC KIDNEY DISEASE (HCC): ICD-10-CM

## 2023-04-25 DIAGNOSIS — N18.30 BENIGN HYPERTENSION WITH CKD (CHRONIC KIDNEY DISEASE) STAGE III (HCC): Primary | ICD-10-CM

## 2023-04-25 LAB — POTASSIUM SERPL-SCNC: 3.6 MMOL/L (ref 3.5–5.3)

## 2023-05-03 ENCOUNTER — TELEPHONE (OUTPATIENT)
Dept: GASTROENTEROLOGY | Facility: CLINIC | Age: 73
End: 2023-05-03

## 2023-05-03 RX ORDER — SODIUM CHLORIDE 9 MG/ML
125 INJECTION, SOLUTION INTRAVENOUS CONTINUOUS
OUTPATIENT
Start: 2023-05-03

## 2023-05-08 ENCOUNTER — APPOINTMENT (OUTPATIENT)
Dept: LAB | Facility: HOSPITAL | Age: 73
End: 2023-05-08

## 2023-05-08 LAB
ANION GAP SERPL CALCULATED.3IONS-SCNC: 10 MMOL/L (ref 4–13)
BACTERIA UR QL AUTO: NORMAL /HPF
BILIRUB UR QL STRIP: NEGATIVE
BUN SERPL-MCNC: 17 MG/DL (ref 5–25)
CALCIUM SERPL-MCNC: 9.9 MG/DL (ref 8.4–10.2)
CHLORIDE SERPL-SCNC: 101 MMOL/L (ref 96–108)
CLARITY UR: CLEAR
CO2 SERPL-SCNC: 32 MMOL/L (ref 21–32)
COLOR UR: YELLOW
CREAT SERPL-MCNC: 1.05 MG/DL (ref 0.6–1.3)
CREAT UR-MCNC: 127 MG/DL
ERYTHROCYTE [DISTWIDTH] IN BLOOD BY AUTOMATED COUNT: 12.6 % (ref 11.6–15.1)
GFR SERPL CREATININE-BSD FRML MDRD: 52 ML/MIN/1.73SQ M
GLUCOSE SERPL-MCNC: 121 MG/DL (ref 65–140)
GLUCOSE UR STRIP-MCNC: NEGATIVE MG/DL
HCT VFR BLD AUTO: 40.5 % (ref 34.8–46.1)
HGB BLD-MCNC: 13.1 G/DL (ref 11.5–15.4)
HGB UR QL STRIP.AUTO: 150
KETONES UR STRIP-MCNC: NEGATIVE MG/DL
LEUKOCYTE ESTERASE UR QL STRIP: 100
MCH RBC QN AUTO: 32.1 PG (ref 26.8–34.3)
MCHC RBC AUTO-ENTMCNC: 32.3 G/DL (ref 31.4–37.4)
MCV RBC AUTO: 99 FL (ref 82–98)
MICROALBUMIN UR-MCNC: 13.5 MG/L (ref 0–20)
MICROALBUMIN/CREAT 24H UR: 11 MG/G CREATININE (ref 0–30)
NITRITE UR QL STRIP: NEGATIVE
NON-SQ EPI CELLS URNS QL MICRO: NORMAL /HPF
PH UR STRIP.AUTO: 7 [PH]
PHOSPHATE SERPL-MCNC: 3.7 MG/DL (ref 2.3–4.1)
PLATELET # BLD AUTO: 244 THOUSANDS/UL (ref 149–390)
PMV BLD AUTO: 11.4 FL (ref 8.9–12.7)
POTASSIUM SERPL-SCNC: 3.5 MMOL/L (ref 3.5–5.3)
PROT UR STRIP-MCNC: NEGATIVE MG/DL
RBC # BLD AUTO: 4.08 MILLION/UL (ref 3.81–5.12)
RBC #/AREA URNS AUTO: NORMAL /HPF
SODIUM SERPL-SCNC: 143 MMOL/L (ref 135–147)
SP GR UR STRIP.AUTO: 1.01 (ref 1–1.04)
UROBILINOGEN UA: NEGATIVE MG/DL
WBC # BLD AUTO: 8.89 THOUSAND/UL (ref 4.31–10.16)
WBC #/AREA URNS AUTO: NORMAL /HPF

## 2023-05-11 ENCOUNTER — OFFICE VISIT (OUTPATIENT)
Dept: FAMILY MEDICINE CLINIC | Facility: CLINIC | Age: 73
End: 2023-05-11

## 2023-05-11 VITALS
HEART RATE: 88 BPM | DIASTOLIC BLOOD PRESSURE: 80 MMHG | BODY MASS INDEX: 23.54 KG/M2 | SYSTOLIC BLOOD PRESSURE: 126 MMHG | RESPIRATION RATE: 16 BRPM | HEIGHT: 67 IN | OXYGEN SATURATION: 99 % | TEMPERATURE: 98.3 F | WEIGHT: 150 LBS

## 2023-05-11 DIAGNOSIS — N18.30 STAGE 3 CHRONIC KIDNEY DISEASE, UNSPECIFIED WHETHER STAGE 3A OR 3B CKD (HCC): ICD-10-CM

## 2023-05-11 DIAGNOSIS — I12.9 BENIGN HYPERTENSION WITH CKD (CHRONIC KIDNEY DISEASE) STAGE III (HCC): ICD-10-CM

## 2023-05-11 DIAGNOSIS — B18.2 CHRONIC HEPATITIS C WITHOUT HEPATIC COMA (HCC): ICD-10-CM

## 2023-05-11 DIAGNOSIS — N18.30 BENIGN HYPERTENSION WITH CKD (CHRONIC KIDNEY DISEASE) STAGE III (HCC): ICD-10-CM

## 2023-05-11 DIAGNOSIS — R73.03 PRE-DIABETES: ICD-10-CM

## 2023-05-11 DIAGNOSIS — E78.5 HYPERLIPIDEMIA, UNSPECIFIED HYPERLIPIDEMIA TYPE: ICD-10-CM

## 2023-05-11 DIAGNOSIS — N30.90 CYSTITIS: ICD-10-CM

## 2023-05-11 DIAGNOSIS — R31.9 HEMATURIA, UNSPECIFIED TYPE: ICD-10-CM

## 2023-05-11 DIAGNOSIS — I10 ESSENTIAL HYPERTENSION: Primary | ICD-10-CM

## 2023-05-11 LAB
SL AMB  POCT GLUCOSE, UA: ABNORMAL
SL AMB LEUKOCYTE ESTERASE,UA: ABNORMAL
SL AMB POCT BILIRUBIN,UA: ABNORMAL
SL AMB POCT BLOOD,UA: ABNORMAL
SL AMB POCT CLARITY,UA: CLEAR
SL AMB POCT COLOR,UA: YELLOW
SL AMB POCT KETONES,UA: ABNORMAL
SL AMB POCT NITRITE,UA: ABNORMAL
SL AMB POCT PH,UA: 6
SL AMB POCT SPECIFIC GRAVITY,UA: 1.01
SL AMB POCT URINE PROTEIN: ABNORMAL
SL AMB POCT UROBILINOGEN: ABNORMAL

## 2023-05-11 RX ORDER — ATORVASTATIN CALCIUM 20 MG/1
20 TABLET, FILM COATED ORAL DAILY
Qty: 90 TABLET | Refills: 1 | Status: SHIPPED | OUTPATIENT
Start: 2023-05-11

## 2023-05-11 RX ORDER — ENALAPRIL MALEATE 20 MG/1
40 TABLET ORAL DAILY
Qty: 180 TABLET | Refills: 0 | Status: SHIPPED | OUTPATIENT
Start: 2023-05-11

## 2023-05-11 RX ORDER — HYDROCHLOROTHIAZIDE 25 MG/1
25 TABLET ORAL DAILY
Qty: 90 TABLET | Refills: 1 | Status: SHIPPED | OUTPATIENT
Start: 2023-05-11

## 2023-05-11 RX ORDER — SULFAMETHOXAZOLE AND TRIMETHOPRIM 800; 160 MG/1; MG/1
1 TABLET ORAL EVERY 12 HOURS SCHEDULED
Qty: 6 TABLET | Refills: 0 | Status: SHIPPED | OUTPATIENT
Start: 2023-05-11 | End: 2023-05-14

## 2023-05-11 NOTE — PROGRESS NOTES
Name: Blele Barnes      : [birth year]      MRN: [id number]  Encounter Provider: MARISSA Flanagan  Encounter Date: 2023   Encounter department: [facility]     1  Essential hypertension  -     enalapril (VASOTEC) 20 mg tablet; Take 2 tablets (40 mg total) by mouth daily  -     hydrochlorothiazide (HYDRODIURIL) 25 mg tablet; Take 1 tablet (25 mg total) by mouth daily    2  Hyperlipidemia, unspecified hyperlipidemia type  -     Lipid panel; Future  -     atorvastatin (LIPITOR) 20 mg tablet; Take 1 tablet (20 mg total) by mouth daily    3  Pre-diabetes  -     Hemoglobin A1C; Future    4  Chronic hepatitis C without hepatic coma (HCC)  Assessment & Plan:  Antibody + but no viral load consistent with previous treatment, will continue to periodically monitor       5  Benign hypertension with CKD (chronic kidney disease) stage III [facility])  Assessment & Plan:  Lab Results   Component Value Date    EGFR 52 2023    EGFR 54 2022    EGFR 49 2022    CREATININE 1 2023    CREATININE 1 02 2022    CREATININE 1 11 2022     Blood pressure is at goal today in the office   Continue with current regimen: hctz 25 mg daily, enalapril 20 mg daily       6  Hematuria, unspecified type  -     POCT urine dip    7  Cystitis  -     sulfamethoxazole-trimethoprim (BACTRIM DS) 800-160 mg per tablet; Take 1 tablet by mouth every 12 (twelve) hours for 3 days  -     Urine culture    8   Stage 3 chronic kidney disease, unspecified whether stage 3a or 3b CKD [facility])  Assessment & Plan:  Lab Results   Component Value Date    EGFR 52 2023    EGFR 54 2022    EGFR 49 2022    CREATININE 1 05 2023    CREATININE 1 02 2022    CREATININE 1 11 2022     Repeat labs, avoid NSAIDS  Appointment with nephrology next month         Depression Screening and Follow-up Plan: Patient was screened for depression during today's encounter  They screened negative with a PHQ-2 score of 0  Subjective     68 YO female w/ PMH htn, lower GI bleed, chronic hepatitis C, syncope, hyperlipidemia, CKD stage 3 here for follow up of chronic conditions  Reports hx of frequent UTI's and currently with urinary frequency x 1 week  No fever, abdominal or flank pain  Review of Systems   Constitutional: Negative for chills and fever  HENT: Negative for ear pain and sore throat  Eyes: Negative for pain and visual disturbance  Respiratory: Negative for cough and shortness of breath  Cardiovascular: Negative for chest pain and palpitations  Gastrointestinal: Negative for abdominal pain and vomiting  Genitourinary: Positive for frequency  Negative for decreased urine volume, dysuria, flank pain, hematuria, pelvic pain, vaginal bleeding, vaginal discharge and vaginal pain  Musculoskeletal: Negative for arthralgias and back pain  Skin: Negative for color change and rash  Neurological: Negative for seizures and syncope  All other systems reviewed and are negative  Past Medical History:   Diagnosis Date   • Arthritis    • Diverticulosis of colon    • Hx of bleeding disorder     rectal bleeding   • Hypertension      Past Surgical History:   Procedure Laterality Date   • COLONOSCOPY N/A 3/28/2017    Procedure: COLONOSCOPY;  Surgeon: Ana Carlos MD;  Location: [facility]; Service:    • COLONOSCOPY N/A 3/26/2017    Procedure: COLONOSCOPY with hemostasis clip placement;  Surgeon: Ryanne Laboy MD;  Location: [facility]; Service:    • COLONOSCOPY N/A 1/27/2017    Procedure: COLONOSCOPY;  Surgeon: Derek Garcia MD;  Location: [facility]; Service:    • COLONOSCOPY N/A 4/7/2017    Procedure: COLONOSCOPY;  Surgeon: Ryanne Laboy MD;  Location: [facility]; Service:    • ESOPHAGOGASTRODUODENOSCOPY N/A 3/27/2017    Procedure: ESOPHAGOGASTRODUODENOSCOPY (EGD) with APC; Surgeon: Ana Carlos MD;  Location: [facility];   Service:    • CA SIGMOIDOSCOPY FLX DX W/COLLJ SPEC BR/WA IF PFRMD N/A 3/27/2017    Procedure: Bk Oliveros;  Surgeon: Rhina Corona MD;  Location: [facility]; Service: Gastroenterology   • TUBAL LIGATION       Family History   Problem Relation Age of Onset   • Breast cancer Mother 61   • No Known Problems Father    • No Known Problems Sister    • No Known Problems Sister    • No Known Problems Sister    • Breast cancer Daughter 52   • No Known Problems Maternal Grandmother    • No Known Problems Maternal Grandfather    • No Known Problems Paternal Grandmother    • No Known Problems Paternal Grandfather    • No Known Problems Paternal Aunt      Social History     Socioeconomic History   • Marital status:      Spouse name: None   • Number of children: None   • Years of education: None   • Highest education level: None   Occupational History   • None   Tobacco Use   • Smoking status: Never   • Smokeless tobacco: Never   Vaping Use   • Vaping Use: Never used   Substance and Sexual Activity   • Alcohol use: No   • Drug use: No   • Sexual activity: None   Other Topics Concern   • None   Social History Narrative    Mandaeism    Patient unsure of advance healthcare directive status     Social Determinants of Health     Financial Resource Strain: Low Risk    • Difficulty of Paying Living Expenses: Not hard at all   Food Insecurity: No Food Insecurity   • Worried About Running Out of Food in the Last Year: Never true   • Ran Out of Food in the Last Year: Never true   Transportation Needs: No Transportation Needs   • Lack of Transportation (Medical): No   • Lack of Transportation (Non-Medical):  No   Physical Activity: Not on file   Stress: Not on file   Social Connections: Not on file   Intimate Partner Violence: Not on file   Housing Stability: Not on file     Current Outpatient Medications on File Prior to Visit   Medication Sig   • acetaminophen (TYLENOL) 650 mg CR tablet Take 1 tablet (650 mg total) by mouth every "8 (eight) hours as needed for mild pain (Patient not taking: Reported on 12/7/2022)   • bisacodyl (DULCOLAX) 5 mg EC tablet Take 2 tablets (10 mg total) by mouth once for 1 dose (Patient not taking: Reported on 12/7/2022)   • ferrous gluconate (FERGON) 324 mg tablet Take 1 tablet by mouth 2 (two) times a day before meals for 30 days   • Klor-Con M20 20 MEQ tablet TAKE 1 TABLET BY MOUTH EVERY DAY   • tiZANidine (ZANAFLEX) 2 mg tablet Take 1 tablet (2 mg total) by mouth every 8 (eight) hours as needed for muscle spasms   • [DISCONTINUED] omeprazole (PriLOSEC) 20 mg delayed release capsule Take 20 mg by mouth daily     No Known Allergies  Immunization History   Administered Date(s) Administered   • COVID-19 PFIZER VACCINE 0 3 ML IM 09/18/2021, 10/09/2021   • Hep B, adult 06/18/2018, 08/06/2018, 12/24/2018       Objective     /80 (BP Location: Right arm, Patient Position: Sitting, Cuff Size: Standard)   Pulse 88   Temp 98 3 °F (36 8 °C) (Temporal)   Resp 16   Ht 5' 7\" (1 702 m)   Wt 68 kg (150 lb)   SpO2 99%   BMI 23 49 kg/m²     Physical Exam  Vitals and nursing note reviewed  Constitutional:       General: She is not in acute distress  Appearance: She is well-developed  She is not diaphoretic  HENT:      Head: Normocephalic and atraumatic  Eyes:      Pupils: Pupils are equal, round, and reactive to light  Cardiovascular:      Rate and Rhythm: Normal rate and regular rhythm  Pulmonary:      Effort: Pulmonary effort is normal  No respiratory distress  Breath sounds: Normal breath sounds  No wheezing  Abdominal:      General: Bowel sounds are normal  There is no distension  Palpations: Abdomen is soft  Tenderness: There is no abdominal tenderness  There is no right CVA tenderness, left CVA tenderness, guarding or rebound  Musculoskeletal:         General: No deformity  Normal range of motion  Cervical back: Normal range of motion and neck supple     Lymphadenopathy:      " Cervical: No cervical adenopathy  Skin:     General: Skin is warm and dry  Capillary Refill: Capillary refill takes less than 2 seconds  Findings: No rash  Neurological:      Mental Status: She is alert and oriented to person, place, and time  Sensory: No sensory deficit        Coordination: Coordination normal       Deep Tendon Reflexes: Reflexes normal    Psychiatric:         Behavior: Behavior normal        Immunization History   Administered Date(s) Administered   • COVID-19 PFIZER VACCINE 0 3 ML IM 09/18/2021, 10/09/2021   • Hep B, adult 06/18/2018, 08/06/2018, 12/24/2018       MARISSA Buckner

## 2023-05-12 PROBLEM — D62 ACUTE BLOOD LOSS ANEMIA: Status: RESOLVED | Noted: 2017-03-26 | Resolved: 2023-05-12

## 2023-05-12 LAB — BACTERIA UR CULT: NORMAL

## 2023-05-12 NOTE — ASSESSMENT & PLAN NOTE
Lab Results   Component Value Date    EGFR 52 05/08/2023    EGFR 54 12/05/2022    EGFR 49 07/28/2022    CREATININE 1 05 05/08/2023    CREATININE 1 02 12/05/2022    CREATININE 1 11 07/28/2022     Blood pressure is at goal today in the office   Continue with current regimen: hctz 25 mg daily, enalapril 20 mg daily

## 2023-05-12 NOTE — ASSESSMENT & PLAN NOTE
Lab Results   Component Value Date    EGFR 52 05/08/2023    EGFR 54 12/05/2022    EGFR 49 07/28/2022    CREATININE 1 05 05/08/2023    CREATININE 1 02 12/05/2022    CREATININE 1 11 07/28/2022     Repeat labs, avoid NSAIDS  Appointment with nephrology next month

## 2023-06-01 ENCOUNTER — OFFICE VISIT (OUTPATIENT)
Dept: NEPHROLOGY | Facility: CLINIC | Age: 73
End: 2023-06-01

## 2023-06-01 VITALS
HEIGHT: 67 IN | BODY MASS INDEX: 23.7 KG/M2 | WEIGHT: 151 LBS | SYSTOLIC BLOOD PRESSURE: 122 MMHG | DIASTOLIC BLOOD PRESSURE: 76 MMHG | HEART RATE: 79 BPM

## 2023-06-01 DIAGNOSIS — N18.31 STAGE 3A CHRONIC KIDNEY DISEASE (HCC): ICD-10-CM

## 2023-06-01 DIAGNOSIS — I12.9 BENIGN HYPERTENSION WITH CKD (CHRONIC KIDNEY DISEASE) STAGE III (HCC): Primary | ICD-10-CM

## 2023-06-01 DIAGNOSIS — N28.1 RENAL CYST: ICD-10-CM

## 2023-06-01 DIAGNOSIS — N18.30 BENIGN HYPERTENSION WITH CKD (CHRONIC KIDNEY DISEASE) STAGE III (HCC): Primary | ICD-10-CM

## 2023-06-01 DIAGNOSIS — E87.6 HYPOKALEMIA: ICD-10-CM

## 2023-06-01 RX ORDER — MELATONIN
1000 DAILY
COMMUNITY

## 2023-06-01 NOTE — PROGRESS NOTES
NEPHROLOGY OUTPATIENT PROGRESS NOTE   Maury Rodriguez 75 y o  female MRN: [id number]  DATE: 6/1/2023  Reason for visit:   Chief Complaint   Patient presents with   • Follow-up   • Chronic Kidney Disease   • Hypertension     ASSESSMENT and PLAN:  CKD stage IIIa, baseline creatinine around 1 0 since May 2020, prior 0 7-0 8 going back to 2017  -Creatinine 1 0 in May 2023 stable  -CKD suspect in the setting of underlying hypertension, age-related nephron loss  -UA on 5/8/2023 shows positive leukocytes, 2-4 WBCs/RBCs, no proteinuria  (She was treated with Bactrim for suspected UTI at that time)  -UACR nonsignificant in May 2023  -BMP before next visit  -Prior renal ultrasound in 2021 shows right kidney 9 2 cm, left kidney 9 6 cm, bilateral renal cyst     Hypokalemia, improved   -She has chronic intermittent hypokalemia issues suspect secondary to use of HCTZ  -Most recent serum potassium 3 5 in May 2023    -Continue potassium chloride 20 meq daily  Can have potassium liberal diet      Hypertension  -Blood pressure acceptable in the office today  -Continue current regimen enalapril 40 mg daily, HCTZ 25 mg daily   -She does have upper arm BP machine although does not check BP on regular basis  Again advised her to bring BP machine during next office visit   -Also check BP at home and call back if remains persistent greater than 135/85     Bilateral renal cyst, CT scan in November 2021 shows simple bilateral renal cyst     Diagnoses and all orders for this visit:    Benign hypertension with CKD (chronic kidney disease) stage III ([facility] [address] )  -     Basic metabolic panel; Future  -     CBC; Future  -     Albumin / creatinine urine ratio; Future  -     Magnesium; Future  -     Phosphorus; Future    Stage 3a chronic kidney disease (HCC)  -     Basic metabolic panel; Future  -     CBC; Future  -     Albumin / creatinine urine ratio; Future  -     Magnesium; Future  -     Phosphorus;  Future    Hypokalemia    Renal cyst    Other "orders  -     cholecalciferol (VITAMIN D3) 1,000 units tablet; Take 1,000 Units by mouth daily          SUBJECTIVE / HPI:  Denys Kennedy is a 75y o  year old female with medical issues of hypertension diagnosed in 2010, hyperlipidemia, [address], hep C, status posttreatment, CKD stage III with baseline creatinine around 1 0 since May 2020, prior 0 7-0 8 going back to 2017 who presents for regular follow-up of CKD  Patient does have very mild progression of CKD  Last serum creatinine stable at recent baseline  She denies any urinary complaint  No recent NSAID use  She denies any nausea, vomiting, diarrhea      She has BP machine at home although does not check BP on regular basis  BP is acceptable in the office today  She was recently diagnosed with suspected UTI and was treated with Bactrim for 3 days although she denied any urinary symptoms  Denies any obvious history of autoimmune conditions  Denies any skin rash issues  She remains on stable antihypertensives including enalapril, HCTZ  REVIEW OF SYSTEMS:  More than 10 point review of systems were obtained and discussed in length with the patient  Complete review of systems were negative / unremarkable except mentioned above  PHYSICAL EXAM:  Vitals:    06/01/23 0954 06/01/23 1010   BP: 128/82 122/76   BP Location: Left arm    Patient Position: Sitting    Cuff Size: Standard    Pulse: 79    Weight: 68 5 kg (151 lb)    Height: 5' 7\" (1 702 m)      Body mass index is 23 65 kg/m²  Physical Exam  Vitals reviewed  Constitutional:       Appearance: She is well-developed  HENT:      Head: Normocephalic and atraumatic  Right Ear: External ear normal       Left Ear: External ear normal    Eyes:      Conjunctiva/sclera: Conjunctivae normal    Cardiovascular:      Comments: No significant edema in legs  Pulmonary:      Effort: Pulmonary effort is normal       Breath sounds: Normal breath sounds  No wheezing or rales     Abdominal:      General: " Bowel sounds are normal  There is no distension  Palpations: Abdomen is soft  Tenderness: There is no abdominal tenderness  Musculoskeletal:         General: No deformity  Lymphadenopathy:      Cervical: No cervical adenopathy  Skin:     Findings: No rash  Neurological:      Mental Status: She is alert and oriented to person, place, and time  Psychiatric:         Behavior: Behavior normal          PAST MEDICAL HISTORY:  Past Medical History:   Diagnosis Date   • Arthritis    • Diverticulosis of colon    • Hx of bleeding disorder     rectal bleeding   • Hypertension        PAST SURGICAL HISTORY:  Past Surgical History:   Procedure Laterality Date   • COLONOSCOPY N/A 3/28/2017    Procedure: COLONOSCOPY;  Surgeon: Mere Aparicio MD;  Location: [facility]; Service:    • COLONOSCOPY N/A 3/26/2017    Procedure: COLONOSCOPY with hemostasis clip placement;  Surgeon: Thomas Wagner MD;  Location: [facility]; Service:    • COLONOSCOPY N/A 1/27/2017    Procedure: COLONOSCOPY;  Surgeon: Sudha Samuels MD;  Location: [facility]; Service:    • COLONOSCOPY N/A 4/7/2017    Procedure: COLONOSCOPY;  Surgeon: Thomas Wagner MD;  Location: [facility]; Service:    • ESOPHAGOGASTRODUODENOSCOPY N/A 3/27/2017    Procedure: ESOPHAGOGASTRODUODENOSCOPY (EGD) with APC; Surgeon: Mere Aparicio MD;  Location: [facility]; Service:    • FL SIGMOIDOSCOPY FLX DX W/COLLJ SPEC BR/WA IF PFRMD N/A 3/27/2017    Procedure: Milady Pedro;  Surgeon: Mere Aparicio MD;  Location: [facility];   Service: Gastroenterology   • TUBAL LIGATION         SOCIAL HISTORY:  Social History     Substance and Sexual Activity   Alcohol Use No     Social History     Substance and Sexual Activity   Drug Use No     Social History     Tobacco Use   Smoking Status Never   Smokeless Tobacco Never       FAMILY HISTORY:  Family History   Problem Relation Age of Onset   • Breast cancer Mother 61   • No Known Problems Father    • No Known Problems Sister    • No Known Problems Sister    • No Known Problems Sister    • Breast cancer Daughter 52   • No Known Problems Maternal Grandmother    • No Known Problems Maternal Grandfather    • No Known Problems Paternal Grandmother    • No Known Problems Paternal Grandfather    • No Known Problems Paternal Aunt        MEDICATIONS:    Current Outpatient Medications:   •  atorvastatin (LIPITOR) 20 mg tablet, Take 1 tablet (20 mg total) by mouth daily, Disp: 90 tablet, Rfl: 1  •  cholecalciferol (VITAMIN D3) 1,000 units tablet, Take 1,000 Units by mouth daily, Disp: , Rfl:   •  enalapril (VASOTEC) 20 mg tablet, Take 2 tablets (40 mg total) by mouth daily, Disp: 180 tablet, Rfl: 0  •  ferrous gluconate (FERGON) 324 mg tablet, Take 1 tablet by mouth 2 (two) times a day before meals for 30 days, Disp: 60 tablet, Rfl: 0  •  hydrochlorothiazide (HYDRODIURIL) 25 mg tablet, Take 1 tablet (25 mg total) by mouth daily, Disp: 90 tablet, Rfl: 1  •  Klor-Con M20 20 MEQ tablet, TAKE 1 TABLET BY MOUTH EVERY DAY, Disp: 90 tablet, Rfl: 2  •  tiZANidine (ZANAFLEX) 2 mg tablet, Take 1 tablet (2 mg total) by mouth every 8 (eight) hours as needed for muscle spasms, Disp: 40 tablet, Rfl: 0  •  acetaminophen (TYLENOL) 650 mg CR tablet, Take 1 tablet (650 mg total) by mouth every 8 (eight) hours as needed for mild pain (Patient not taking: Reported on 12/7/2022), Disp: 30 tablet, Rfl: 0  •  bisacodyl (DULCOLAX) 5 mg EC tablet, Take 2 tablets (10 mg total) by mouth once for 1 dose (Patient not taking: Reported on 12/7/2022), Disp: 2 tablet, Rfl: 0    Lab Results:   Results for orders placed or performed in visit on 05/11/23   Urine culture    Specimen: Urine, Clean Catch   Result Value Ref Range    Urine Culture >100,000 cfu/ml    POCT urine dip   Result Value Ref Range    LEUKOCYTE ESTERASE,UA ++     NITRITE,UA -     SL AMB POCT UROBILINOGEN -     POCT URINE PROTEIN trace      PH,UA 6     BLOOD,UA about [facility] 1 015     KETONES,UA - BILIRUBIN,UA -     GLUCOSE, UA -      COLOR,UA yellow     CLARITY,UA clear    Creatinine 1 0

## 2023-07-13 ENCOUNTER — TELEPHONE (OUTPATIENT)
Dept: GASTROENTEROLOGY | Facility: MEDICAL CENTER | Age: 73
End: 2023-07-13

## 2023-07-13 NOTE — TELEPHONE ENCOUNTER
Spoke to patient confirming upcoming procedure. Patient was instructed to call [phone] if they have any questions or concerns about the prep instructions or if they need to change or cancel the procedure.

## 2023-07-17 RX ORDER — SODIUM CHLORIDE, SODIUM LACTATE, POTASSIUM CHLORIDE, CALCIUM CHLORIDE 600; 310; 30; 20 MG/100ML; MG/100ML; MG/100ML; MG/100ML
50 INJECTION, SOLUTION INTRAVENOUS CONTINUOUS
OUTPATIENT
Start: 2023-07-17

## 2023-09-29 DIAGNOSIS — E87.6 HYPOKALEMIA: ICD-10-CM

## 2023-10-02 RX ORDER — POTASSIUM CHLORIDE 1500 MG/1
TABLET, EXTENDED RELEASE ORAL
Qty: 90 TABLET | Refills: 2 | Status: SHIPPED | OUTPATIENT
Start: 2023-10-02

## 2023-10-19 ENCOUNTER — TELEPHONE (OUTPATIENT)
Dept: NEPHROLOGY | Facility: CLINIC | Age: 73
End: 2023-10-19

## 2023-10-19 NOTE — TELEPHONE ENCOUNTER
Called pt to schedule follow up (recall list). Appt scheduled on 1/30/24 in [facility] with Dr Akilah De Oliveira.

## 2023-11-01 ENCOUNTER — APPOINTMENT (OUTPATIENT)
Dept: LAB | Facility: CLINIC | Age: 73
End: 2023-11-01
Payer: MEDICARE

## 2023-11-01 ENCOUNTER — OFFICE VISIT (OUTPATIENT)
Dept: OBGYN CLINIC | Facility: CLINIC | Age: 73
End: 2023-11-01
Payer: MEDICARE

## 2023-11-01 VITALS
SYSTOLIC BLOOD PRESSURE: 132 MMHG | HEIGHT: 67 IN | BODY MASS INDEX: 23.42 KG/M2 | DIASTOLIC BLOOD PRESSURE: 78 MMHG | WEIGHT: 149.2 LBS

## 2023-11-01 DIAGNOSIS — Z01.419 ENCOUNTER FOR GYNECOLOGICAL EXAMINATION WITHOUT ABNORMAL FINDING: Primary | ICD-10-CM

## 2023-11-01 DIAGNOSIS — E78.5 HYPERLIPIDEMIA, UNSPECIFIED HYPERLIPIDEMIA TYPE: ICD-10-CM

## 2023-11-01 DIAGNOSIS — R73.03 PRE-DIABETES: ICD-10-CM

## 2023-11-01 DIAGNOSIS — Z12.31 ENCOUNTER FOR SCREENING MAMMOGRAM FOR BREAST CANCER: ICD-10-CM

## 2023-11-01 DIAGNOSIS — R21 RASH AND NONSPECIFIC SKIN ERUPTION: ICD-10-CM

## 2023-11-01 LAB
CHOLEST SERPL-MCNC: 148 MG/DL
EST. AVERAGE GLUCOSE BLD GHB EST-MCNC: 131 MG/DL
HBA1C MFR BLD: 6.2 %
HDLC SERPL-MCNC: 48 MG/DL
LDLC SERPL CALC-MCNC: 81 MG/DL (ref 0–100)
NONHDLC SERPL-MCNC: 100 MG/DL
TRIGL SERPL-MCNC: 93 MG/DL

## 2023-11-01 PROCEDURE — 83036 HEMOGLOBIN GLYCOSYLATED A1C: CPT

## 2023-11-01 PROCEDURE — 80061 LIPID PANEL: CPT

## 2023-11-01 PROCEDURE — 36415 COLL VENOUS BLD VENIPUNCTURE: CPT

## 2023-11-01 PROCEDURE — G0101 CA SCREEN;PELVIC/BREAST EXAM: HCPCS | Performed by: NURSE PRACTITIONER

## 2023-11-01 RX ORDER — AMOXICILLIN 500 MG/1
CAPSULE ORAL
COMMUNITY

## 2023-11-01 RX ORDER — SENNOSIDES A AND B 8.6 MG/1
1 TABLET, FILM COATED ORAL DAILY
COMMUNITY

## 2023-11-01 NOTE — PATIENT INSTRUCTIONS
Calcium and Osteoporosis   AMBULATORY CARE:   Why calcium is important for osteoporosis:  Calcium is important for osteoporosis because calcium helps build bone mass. Osteoporosis is a long-term medical condition that causes your body to break down more bone than it makes. Your bones become weak, brittle, and more likely to fracture. Your calcium needs:   Women:      19 to 50 years: 1,000 mg    Over 50: 1,200 mg    Pregnant or breastfeeding, 19 years to 50 years: 1,000 mg    Men:      19 to 70: 1,000 mg    Over 70: 1,200 mg    Foods that are high in calcium: The following list shows the number of calcium milligrams (mg) per serving. Your dietitian or healthcare provider can help you create a balanced meal plan for your calcium needs. Dairy:      1 cup of low-fat plain yogurt (415 mg) or low-fat fruit yogurt (245 to 384 mg)    1½ ounces of shredded cheddar cheese (306 mg) or part skim mozzarella cheese (275 mg)    1 cup of skim, 2%, or whole milk (300 mg)    1 cup of cottage cheese made with 2% milk fat (138 mg)    ½ cup of frozen yogurt (103 mg)    Other foods:      1 cup of calcium-fortified orange juice (300 mg)    ½ cup of cooked charlotte greens (220 mg)    4 canned sardines, with bones (242 mg)    ½ cup of tofu (with added calcium) (204 mg)       How to get extra calcium:   Add powdered milk to puddings, cocoa, custard, or hot cereal.    Sift powdered milk into flour when you make cakes, cookies, or breads. Use low-fat or fat-free milk instead of water in pancake mix, mashed potatoes, pudding, or hot breakfast cereal.    Add low-fat or fat-free cheese to salad, soup, or pasta. Add tofu (with added calcium) to vegetable stir-keith. Take calcium supplements if you cannot get enough calcium from the foods you eat. Your body can absorb the most calcium from supplements when you take 500 mg or less at one time. Do not take more than 2,500 mg of calcium supplements each day.     Follow up with your doctor or dietitian as directed:  Write down your questions so you remember to ask them during your visits. © Copyright [facility] 2023 Information is for End User's use only and may not be sold, redistributed or otherwise used for commercial purposes. The above information is an  only. It is not intended as medical advice for individual conditions or treatments. Talk to your doctor, nurse or pharmacist before following any medical regimen to see if it is safe and effective for you.

## 2023-11-01 NOTE — PROGRESS NOTES
Assessment/Plan:    1. Encounter for gynecological examination without abnormal finding  Normal well woman exam.  Last pap reportedly 5 years ago in [facility]-- requested she release report to us. Otherwise no hx of abnormal pap smears and cervical cancer screening would no longer be indicated. Overdue to colonoscopy. Her pcp provided her with a referral and she is aware of need to update. DEXA is up to date       2. Encounter for screening mammogram for breast cancer  Order provided for update    - Mammo screening bilateral w 3d & cad; Future    3. Rash and nonspecific skin eruption  Uncertain etiology, within crease of buttocks. Advised to observe and if it does not resolve on its own in a few weeks, suggest having it checked by a dermatologist.      Subjective:      Patient ID: Emanuel Shea is a 75 y.o. female. HPI  NEW PATIENT     76 yo  PMF presents for her GYN exam.    Last pap done ~ 5 yrs ago in [place], no report available. Denies hx of abnormal paps. 2022 Mammo NL  STD hx: none; has chronic hepatitis C but is not sure of the source. Hx of breast ca in her mother and her daughter (age 52- not sure if her daughter had genetic testing)  2017 Colonoscopy, believes she was due this year and she was given a referral for update from her PCP.  2022 DEXA LS -1.6, tot hip -0.8, FN -1.3    She has not been sexually active since . The following portions of the patient's history were reviewed and updated as appropriate: allergies, current medications, past family history, past medical history, past social history, past surgical history, and problem list.    Review of Systems   Constitutional:  Negative for chills and fever. Respiratory:  Negative for cough and shortness of breath. Gastrointestinal:  Negative for abdominal distention, abdominal pain, blood in stool, constipation, diarrhea, nausea and vomiting.    Genitourinary:  Negative for difficulty urinating, dysuria, frequency, genital sores, hematuria, menstrual problem, pelvic pain, urgency, vaginal bleeding and vaginal discharge. Musculoskeletal:  Negative for arthralgias and myalgias. Objective:    /78 (BP Location: Right arm, Patient Position: Sitting, Cuff Size: Standard)   Ht 5' 7" (1.702 m)   Wt 67.7 kg (149 lb 3.2 oz)   BMI 23.37 kg/m²      Physical Exam  Constitutional:       General: She is not in acute distress. Appearance: Normal appearance. She is well-developed and normal weight. She is not ill-appearing or diaphoretic. HENT:      Head: Normocephalic and atraumatic. Eyes:      Pupils: Pupils are equal, round, and reactive to light. Neck:      Thyroid: No thyromegaly. Pulmonary:      Effort: Pulmonary effort is normal.   Chest:   Breasts:     Breasts are symmetrical.      Right: No inverted nipple, mass, nipple discharge, skin change or tenderness. Left: No inverted nipple, mass, nipple discharge, skin change or tenderness. Abdominal:      General: There is no distension. Palpations: Abdomen is soft. There is no mass. Tenderness: There is no abdominal tenderness. There is no guarding or rebound. Genitourinary:     General: Normal vulva. Exam position: Lithotomy position. Labia:         Right: No rash, tenderness, lesion or injury. Left: No rash, tenderness, lesion or injury. Vagina: No signs of injury and foreign body. No vaginal discharge, erythema, tenderness or bleeding. Cervix: No cervical motion tenderness, discharge or friability. Uterus: Not enlarged and not tender. Adnexa:         Right: No mass or tenderness. Left: No mass or tenderness. Musculoskeletal:      Cervical back: Neck supple. Lymphadenopathy:      Cervical: No cervical adenopathy. Upper Body:      Right upper body: No supraclavicular adenopathy. Left upper body: No supraclavicular adenopathy. Skin:     General: Skin is warm and dry. Neurological:      General: No focal deficit present. Mental Status: She is alert and oriented to person, place, and time. Psychiatric:         Mood and Affect: Mood normal.         Behavior: Behavior normal.         Thought Content:  Thought content normal.         Judgment: Judgment normal.

## 2023-11-02 DIAGNOSIS — R73.03 PRE-DIABETES: Primary | ICD-10-CM

## 2023-11-03 DIAGNOSIS — E78.5 HYPERLIPIDEMIA, UNSPECIFIED HYPERLIPIDEMIA TYPE: ICD-10-CM

## 2023-11-03 RX ORDER — ATORVASTATIN CALCIUM 20 MG/1
20 TABLET, FILM COATED ORAL DAILY
Qty: 90 TABLET | Refills: 1 | Status: SHIPPED | OUTPATIENT
Start: 2023-11-03

## 2023-11-14 ENCOUNTER — RA CDI HCC (OUTPATIENT)
Dept: OTHER | Facility: HOSPITAL | Age: 73
End: 2023-11-14

## 2023-11-29 DIAGNOSIS — I10 ESSENTIAL HYPERTENSION: ICD-10-CM

## 2023-11-30 DIAGNOSIS — I10 ESSENTIAL HYPERTENSION: ICD-10-CM

## 2023-11-30 RX ORDER — ENALAPRIL MALEATE 20 MG/1
40 TABLET ORAL DAILY
Qty: 180 TABLET | Refills: 0 | Status: SHIPPED | OUTPATIENT
Start: 2023-11-30 | End: 2023-12-05

## 2023-12-05 ENCOUNTER — OFFICE VISIT (OUTPATIENT)
Dept: FAMILY MEDICINE CLINIC | Facility: CLINIC | Age: 73
End: 2023-12-05

## 2023-12-05 VITALS
RESPIRATION RATE: 18 BRPM | BODY MASS INDEX: 23.26 KG/M2 | HEART RATE: 91 BPM | OXYGEN SATURATION: 97 % | WEIGHT: 148.2 LBS | SYSTOLIC BLOOD PRESSURE: 128 MMHG | DIASTOLIC BLOOD PRESSURE: 84 MMHG | TEMPERATURE: 97.9 F | HEIGHT: 67 IN

## 2023-12-05 DIAGNOSIS — E78.5 HYPERLIPIDEMIA, UNSPECIFIED HYPERLIPIDEMIA TYPE: ICD-10-CM

## 2023-12-05 DIAGNOSIS — D64.9 ANEMIA, UNSPECIFIED TYPE: ICD-10-CM

## 2023-12-05 DIAGNOSIS — R73.03 PRE-DIABETES: ICD-10-CM

## 2023-12-05 DIAGNOSIS — N18.30 BENIGN HYPERTENSION WITH CKD (CHRONIC KIDNEY DISEASE) STAGE III (HCC): Primary | ICD-10-CM

## 2023-12-05 DIAGNOSIS — E55.9 VITAMIN D DEFICIENCY: ICD-10-CM

## 2023-12-05 DIAGNOSIS — I10 ESSENTIAL HYPERTENSION: ICD-10-CM

## 2023-12-05 DIAGNOSIS — E87.6 HYPOKALEMIA: ICD-10-CM

## 2023-12-05 DIAGNOSIS — N18.31 STAGE 3A CHRONIC KIDNEY DISEASE (HCC): ICD-10-CM

## 2023-12-05 DIAGNOSIS — N18.30 STAGE 3 CHRONIC KIDNEY DISEASE, UNSPECIFIED WHETHER STAGE 3A OR 3B CKD (HCC): ICD-10-CM

## 2023-12-05 DIAGNOSIS — I12.9 BENIGN HYPERTENSION WITH CKD (CHRONIC KIDNEY DISEASE) STAGE III (HCC): Primary | ICD-10-CM

## 2023-12-05 PROBLEM — R00.0 SINUS TACHYCARDIA: Status: RESOLVED | Noted: 2017-01-27 | Resolved: 2023-12-05

## 2023-12-05 PROBLEM — R55 SYNCOPE AND COLLAPSE: Status: RESOLVED | Noted: 2017-03-26 | Resolved: 2023-12-05

## 2023-12-05 PROCEDURE — 99214 OFFICE O/P EST MOD 30 MIN: CPT | Performed by: NURSE PRACTITIONER

## 2023-12-05 RX ORDER — ENALAPRIL MALEATE AND HYDROCHLOROTHIAZIDE 10; 25 MG/1; MG/1
1 TABLET ORAL DAILY
Qty: 90 TABLET | Refills: 1 | Status: SHIPPED | OUTPATIENT
Start: 2023-12-05

## 2023-12-05 RX ORDER — POTASSIUM CHLORIDE 20 MEQ/1
20 TABLET, EXTENDED RELEASE ORAL DAILY
Qty: 90 TABLET | Refills: 1 | Status: SHIPPED | OUTPATIENT
Start: 2023-12-05

## 2023-12-05 RX ORDER — ENALAPRIL MALEATE AND HYDROCHLOROTHIAZIDE 10; 25 MG/1; MG/1
TABLET ORAL
COMMUNITY
End: 2023-12-05 | Stop reason: SDUPTHER

## 2023-12-05 RX ORDER — ENALAPRIL MALEATE AND HYDROCHLOROTHIAZIDE 10; 25 MG/1; MG/1
1 TABLET ORAL DAILY
Qty: 90 TABLET | Refills: 1 | Status: CANCELLED | OUTPATIENT
Start: 2023-12-05

## 2023-12-05 NOTE — ASSESSMENT & PLAN NOTE
Lab Results   Component Value Date    EGFR 52 05/08/2023    EGFR 54 12/05/2022    EGFR 49 07/28/2022    CREATININE 1.05 05/08/2023    CREATININE 1.02 12/05/2022    CREATININE 1.11 07/28/2022     Levels have been stable  Continue to monitor

## 2023-12-05 NOTE — ASSESSMENT & PLAN NOTE
Lab Results   Component Value Date    HGBA1C 6.2 (H) 11/01/2023     She was started on metformin 500 mg daily, would not increase this given eGFR, if additional medication is necessary for BG management would recommend Tradjenta

## 2023-12-05 NOTE — ASSESSMENT & PLAN NOTE
Lab Results   Component Value Date    EGFR 52 05/08/2023    EGFR 54 12/05/2022    EGFR 49 07/28/2022    CREATININE 1.05 05/08/2023    CREATININE 1.02 12/05/2022    CREATININE 1.11 07/28/2022     BP is within goal - continue with enalapril/ HCTZ 10-25 mg daily, continue potassium

## 2023-12-05 NOTE — PROGRESS NOTES
Name: Jennifer Toscano      : 789      MRN: [id number]  Encounter Provider: MARISSA Paulson  Encounter Date: 2023   Encounter department: [address] [facility],[address]     1. Benign hypertension with CKD (chronic kidney disease) stage III [facility])  Assessment & Plan:  Lab Results   Component Value Date    EGFR 52 2023    EGFR 54 2022    EGFR 49 2022    CREATININE 1.05 2023    CREATININE 1.02 2022    CREATININE 1.11 2022     BP is within goal - continue with enalapril/ HCTZ 10-25 mg daily, continue potassium     Orders:  -     Comprehensive metabolic panel; Future  -     CBC and differential; Future  -     enalapril-hydrochlorothiazide (VASERETIC) 10-25 MG per tablet; Take 1 tablet by mouth daily    2. Stage 3a chronic kidney disease [facility])  Assessment & Plan:  Lab Results   Component Value Date    EGFR 52 2023    EGFR 54 2022    EGFR 49 2022    CREATININE 1.05 2023    CREATININE 1.02 2022    CREATININE 1.11 2022     Levels have been stable  Continue to monitor     Orders:  -     Comprehensive metabolic panel; Future  -     CBC and differential; Future    3. Vitamin D deficiency  -     Vitamin D 25 hydroxy; Future    4. Hypokalemia  -     potassium chloride (Klor-Con M20) 20 mEq tablet; Take 1 tablet (20 mEq total) by mouth daily    5.  Hyperlipidemia, unspecified hyperlipidemia type  Assessment & Plan:  Lab Results   Component Value Date    CHOLESTEROL 148 2023    CHOLESTEROL 120 2022    CHOLESTEROL 201 (H) 2021     Lab Results   Component Value Date    HDL 48 (L) 2023    HDL 44 (L) 2022    HDL 53 2021     Lab Results   Component Value Date    TRIG 93 2023    TRIG 66 2022    TRIG 115 2021     Lab Results   Component Value Date    NONHDLC 100 2023    [address] 76 2022    [address] 148 2021     Lab Results   Component Value Date    LDLCALC 81 11/01/2023     Continue atorvastatin 20 mg daily     Orders:  -     Lipid panel; Future    6. Pre-diabetes  Assessment & Plan:  Lab Results   Component Value Date    HGBA1C 6.2 (H) 11/01/2023     She was started on metformin 500 mg daily, would not increase this given eGFR, if additional medication is necessary for BG management would recommend Tradjenta     Orders:  -     metFORMIN (GLUCOPHAGE) 500 mg tablet; Take 1 tablet (500 mg total) by mouth daily with breakfast  -     Hemoglobin A1C; Future    7. Stage 3 chronic kidney disease, unspecified whether stage 3a or 3b CKD (HCC)  -     Iron Panel (Includes Ferritin, Iron Sat%, Iron, and TIBC); Future    8. Essential hypertension    9. Anemia, unspecified type  -     CBC and differential; Future  -     Iron Panel (Includes Ferritin, Iron Sat%, Iron, and TIBC); Future         Subjective     66 YO female w/ PMH htn, lower GI bleed, chronic hepatitis C, syncope, hyperlipidemia, CKD stage 3 here for follow up of chronic conditions. Reports since last visit that she is doing well. She recently had labs done that showed A1c up to 6.2 so she was started on metformin, reports she is not having any side effects and is watching her diet. The following portions of the patient's history were reviewed and updated as appropriate: allergies, current medications, past family history, past medical history, past social history, past surgical history and problem list.        Review of Systems   Constitutional:  Negative for activity change, appetite change, chills, fatigue, fever and unexpected weight change. HENT:  Negative for hearing loss, nosebleeds, sinus pain, sneezing, sore throat and trouble swallowing. Eyes:  Negative for photophobia and visual disturbance. Respiratory:  Negative for cough, chest tightness, shortness of breath and wheezing. Cardiovascular:  Negative for chest pain, palpitations and leg swelling.    Gastrointestinal: Negative for abdominal pain, constipation, nausea and vomiting. Genitourinary:  Negative for decreased urine volume, difficulty urinating, dysuria, flank pain, genital sores, hematuria and urgency. Musculoskeletal:  Negative for back pain and gait problem. Skin:  Negative for pallor, rash and wound. Neurological:  Negative for dizziness, seizures, syncope, weakness, numbness and headaches. Hematological:  Negative for adenopathy. Does not bruise/bleed easily. Psychiatric/Behavioral:  Negative for confusion, hallucinations, self-injury, sleep disturbance and suicidal ideas. The patient is not nervous/anxious. Past Medical History:   Diagnosis Date   • Arthritis    • Chronic hepatitis C ([address])    • Diverticulosis of colon    • Hx of bleeding disorder     rectal bleeding   • Hypertension      Past Surgical History:   Procedure Laterality Date   • COLONOSCOPY N/A 3/28/2017    Procedure: COLONOSCOPY;  Surgeon: Yue Whitt MD;  Location: [facility]; Service:    • COLONOSCOPY N/A 3/26/2017    Procedure: COLONOSCOPY with hemostasis clip placement;  Surgeon: Mansi Velazco MD;  Location[facility]; Service:    • COLONOSCOPY N/A 1/27/2017    Procedure: COLONOSCOPY;  Surgeon: Bessy Parsons MD;  Location[facility]; Service:    • COLONOSCOPY N/A 4/7/2017    Procedure: COLONOSCOPY;  Surgeon: Mansi Velazco MD;  [facility]; Service:    • ESOPHAGOGASTRODUODENOSCOPY N/A 3/27/2017    Procedure: ESOPHAGOGASTRODUODENOSCOPY (EGD) with APC; Surgeon: Yue Whitt MD;  Location[facility]; Service:    • NY SIGMOIDOSCOPY FLX DX W/COLLJ SPEC BR/WA IF PFRMD N/A 3/27/2017    Procedure: Maribell Capone;  Surgeon: Yue Whitt MD;  Location: [facility];   Service: Gastroenterology   • TUBAL LIGATION       Family History   Problem Relation Age of Onset   • Breast cancer Mother 61   • No Known Problems Father    • No Known Problems Sister    • No Known Problems Sister    • No Known Problems Sister    • Breast cancer Daughter 52   • No Known Problems Maternal Grandmother    • No Known Problems Maternal Grandfather    • No Known Problems Paternal Grandmother    • No Known Problems Paternal Grandfather    • No Known Problems Paternal Aunt    • Colon cancer Neg Hx    • Ovarian cancer Neg Hx    • Uterine cancer Neg Hx      Social History     Socioeconomic History   • Marital status:      Spouse name: None   • Number of children: None   • Years of education: None   • Highest education level: None   Occupational History   • None   Tobacco Use   • Smoking status: Never   • Smokeless tobacco: Never   Vaping Use   • Vaping Use: Never used   Substance and Sexual Activity   • Alcohol use: No   • Drug use: No   • Sexual activity: Not Currently     Partners: Male     Birth control/protection: None     Comment: last sexually active 2012   Other Topics Concern   • None   Social History Narrative    Shinto    Patient unsure of advance healthcare directive status     Social Determinants of Health     Financial Resource Strain: Low Risk  (5/11/2023)    Overall Financial Resource Strain (CARDIA)    • Difficulty of Paying Living Expenses: Not hard at all   Food Insecurity: No Food Insecurity (5/11/2023)    Hunger Vital Sign    • Worried About Running Out of Food in the Last Year: Never true    • Ran Out of Food in the Last Year: Never true   Transportation Needs: No Transportation Needs (5/11/2023)    PRAPARE - Transportation    • Lack of Transportation (Medical): No    • Lack of Transportation (Non-Medical):  No   Physical Activity: Not on file   Stress: Not on file   Social Connections: Not on file   Intimate Partner Violence: Not on file   Housing Stability: Not on file     Current Outpatient Medications on File Prior to Visit   Medication Sig   • atorvastatin (LIPITOR) 20 mg tablet TAKE 1 TABLET BY MOUTH EVERY DAY   • cholecalciferol (VITAMIN D3) 1,000 units tablet Take 1,000 Units by mouth daily   • ferrous gluconate (FERGON) 324 mg tablet Take 1 tablet by mouth 2 (two) times a day before meals for 30 days   • tiZANidine (ZANAFLEX) 2 mg tablet Take 1 tablet (2 mg total) by mouth every 8 (eight) hours as needed for muscle spasms   • [DISCONTINUED] acetaminophen (TYLENOL) 650 mg CR tablet Take 1 tablet (650 mg total) by mouth every 8 (eight) hours as needed for mild pain (Patient not taking: Reported on 12/7/2022)   • [DISCONTINUED] amoxicillin (AMOXIL) 500 mg capsule Take 1 capsule 3 times a day by oral route for 10 days. (Patient not taking: Reported on 11/1/2023)   • [DISCONTINUED] bisacodyl (DULCOLAX) 5 mg EC tablet Take 2 tablets (10 mg total) by mouth once for 1 dose (Patient not taking: Reported on 12/7/2022)   • [DISCONTINUED] enalapril (VASOTEC) 20 mg tablet TAKE 2 TABLETS (40 MG TOTAL) BY MOUTH DAILY. • [DISCONTINUED] enalapril (VASOTEC) 20 mg tablet Take 2 tablets (40 mg total) by mouth daily   • [DISCONTINUED] enalapril-hydrochlorothiazide (VASERETIC) 10-25 MG per tablet Take 1 tablet every day by oral route.    • [DISCONTINUED] hydrochlorothiazide (HYDRODIURIL) 25 mg tablet Take 1 tablet (25 mg total) by mouth daily   • [DISCONTINUED] Klor-Con M20 20 MEQ tablet TAKE 1 TABLET BY MOUTH EVERY DAY   • [DISCONTINUED] metFORMIN (GLUCOPHAGE) 500 mg tablet TAKE 1 TABLET BY MOUTH EVERY DAY WITH BREAKFAST   • [DISCONTINUED] omeprazole (PriLOSEC) 20 mg delayed release capsule Take 20 mg by mouth daily   • [DISCONTINUED] polyethylene glycol (GOLYTELY) 4000 mL solution  (Patient not taking: Reported on 11/1/2023)   • [DISCONTINUED] senna (SENOKOT) 8.6 MG tablet Take 1 tablet by mouth daily (Patient not taking: Reported on 11/1/2023)     No Known Allergies  Immunization History   Administered Date(s) Administered   • COVID-19 PFIZER VACCINE 0.3 ML IM 09/18/2021, 10/09/2021   • Hep B, adult 06/18/2018, 08/06/2018, 12/24/2018       Objective     /84 (BP Location: Left arm, Patient Position: Sitting, Cuff Size: Standard)   Pulse 91 Temp 97.9 °F (36.6 °C) (Temporal)   Resp 18   Ht 5' 7" (1.702 m)   Wt 67.2 kg (148 lb 3.2 oz)   SpO2 97%   Breastfeeding No   BMI 23.21 kg/m²     Physical Exam  Vitals and nursing note reviewed. Constitutional:       General: She is not in acute distress. Appearance: She is well-developed. She is not diaphoretic. HENT:      Head: Normocephalic and atraumatic. Right Ear: External ear normal.      Left Ear: External ear normal.   Eyes:      General:         Right eye: No discharge. Left eye: No discharge. Conjunctiva/sclera: Conjunctivae normal.   Cardiovascular:      Rate and Rhythm: Normal rate and regular rhythm. Pulmonary:      Effort: Pulmonary effort is normal. No respiratory distress. Breath sounds: Normal breath sounds. No wheezing. Abdominal:      General: Bowel sounds are normal. There is no distension. Palpations: Abdomen is soft. Tenderness: There is no abdominal tenderness. Musculoskeletal:      Cervical back: Normal range of motion and neck supple. Lymphadenopathy:      Cervical: No cervical adenopathy. Skin:     General: Skin is warm and dry. Capillary Refill: Capillary refill takes less than 2 seconds. Findings: No rash. Neurological:      General: No focal deficit present. Mental Status: She is alert and oriented to person, place, and time.    Psychiatric:         Mood and Affect: Mood normal.         Behavior: Behavior normal.       Nury Holden

## 2023-12-05 NOTE — ASSESSMENT & PLAN NOTE
Lab Results   Component Value Date    CHOLESTEROL 148 11/01/2023    CHOLESTEROL 120 07/28/2022    CHOLESTEROL 201 (H) 06/11/2021     Lab Results   Component Value Date    HDL 48 (L) 11/01/2023    HDL 44 (L) 07/28/2022    HDL 53 06/11/2021     Lab Results   Component Value Date    TRIG 93 11/01/2023    TRIG 66 07/28/2022    TRIG 115 06/11/2021     Lab Results   Component Value Date    NONHDLC 100 11/01/2023    [address] 76 07/28/2022    NONHDLC 148 06/11/2021     Lab Results   Component Value Date    LDLCALC 81 11/01/2023     Continue atorvastatin 20 mg daily

## 2023-12-05 NOTE — PATIENT INSTRUCTIONS
Meal Planning with the Plate Method   AMBULATORY CARE:   Meal planning with the plate method  is a way for people with diabetes to plan meals. The plate method can help you eat the right amount of carbohydrates and keep your blood sugar levels under control. Carbohydrates naturally raise your blood sugar level. Your blood sugar level can rise too high if you eat too much carbohydrate at one time. Carbohydrates are found in starches (bread, cereal, starchy vegetables, and beans), fruit, milk, yogurt, and sweets. How to use the plate method to plan meals:   Draw an imaginary line down the middle of a 9-inch dinner plate. On one side, draw another line to divide that section in half. Your plate will have 3 sections. Fill the largest section of your plate with non-starchy vegetables. These include broccoli, spinach, cucumbers, peppers, cauliflower, and tomatoes. Add a carbohydrate to 1 of the small sections of your plate. Examples are pasta, rice, whole-grain bread, tortillas, corn, potatoes, and beans. Your plan may allow a serving of low-fat dairy or fruit for a carbohydrate. Add meat or another source of protein to the other small section of your plate. Examples include chicken or turkey without skin, fish, lean beef or pork, low-fat cheese, tofu, or eggs. Add a low-calorie or calorie-free drink. Examples include water or unsweetened tea or coffee.        Serving sizes of foods:   Non-starchy vegetables:      ½ cup of cooked vegetables or 1 cup of raw vegetables    ½ cup of vegetable juice    Starches:      1 ounce of whole-wheat bread or 1 small (6 inch) flour or corn tortilla    1 small (4 inch) pancake (about ¼ inch thick)    ¾ cup of dry, unsweetened, whole-grain ready-to-eat cereal or ¼ cup of low-fat granola    ½ cup of cooked cereal or oatmeal    ? cup of rice or pasta    ½ cup of corn, green peas, sweet potatoes, or mashed potatoes    ½ cup of cooked beans and peas (ria bates, kidney, white, split, black-eyed)    Meat and other protein sources:      3 to 4 ounces of any lean meat, fish, or poultry    ½ cup of tofu or tempeh    1 large egg    1½ ounces (about 2 tablespoons) of nuts or 2 tablespoons of peanut butter    Fruit:      1 small piece of fresh fruit     ½ cup of canned or fresh fruit or unsweetened fruit juice    ¼ cup of dried fruit    Milk and yogurt:      1 cup (8 ounces) of skim or 1% milk    ¾ cup (6 ounces) of plain, non-fat yogurt    Other healthy nutrition tips:   Limit salt and sugar. Choose and prepare foods and drinks with less salt and added sugars. Use the nutrition information on food labels to help you make healthy choices. The percent daily value listed on the food label tells you whether a food is low or high in certain nutrients. A percent daily value of 5% or less means that the food is low in a nutrient. A percent daily value of 20% or more means that the food is high in a nutrient. Choose healthy fats. Choose healthy fats such as polyunsaturated and monounsaturated fats in place of unhealthy fats. Healthy fats are found in vegetable oils such as soybean, corn, canola, olive, and sunflower oil. Unhealthy fats are saturated fats, trans fats, and cholesterol. Unhealthy fats are found in shortening, butter, stick margarine, and animal fat. Ask your healthcare provider if alcohol is okay for you. Generally, men 72 or older and women should limit alcohol to 1 drink within 24 hours and 7 within 1 week. Men 21 to 64 years should limit alcohol to 2 drinks a day and 14 within 1 week. Your provider can tell you how many drinks are okay for you within 24 hours or within 1 week. A drink of alcohol is 12 ounces of beer, 5 ounces of wine, or 1½ ounces of liquor. Always have food when you drink alcohol. Your blood sugar may fall to a low level if you drink when your stomach is empty.     © Copyright Pearle[employer] Simran 2023 Information is for End User's use only and may not be sold, redistributed or otherwise used for commercial purposes. The above information is an  only. It is not intended as medical advice for individual conditions or treatments. Talk to your doctor, nurse or pharmacist before following any medical regimen to see if it is safe and effective for you.

## 2024-01-29 ENCOUNTER — TELEPHONE (OUTPATIENT)
Dept: NEPHROLOGY | Facility: CLINIC | Age: 74
End: 2024-01-29

## 2024-01-29 NOTE — TELEPHONE ENCOUNTER
Called pt to confirm appt tomorrow 1/30 with Dr Donis in the [facility], however the pt stated she is unable to make the appt. She has rescheduled on 3/20 with Dr Donis in [facility].

## 2024-02-02 ENCOUNTER — TELEPHONE (OUTPATIENT)
Dept: FAMILY MEDICINE CLINIC | Facility: CLINIC | Age: 74
End: 2024-02-02

## 2024-02-02 NOTE — TELEPHONE ENCOUNTER
Hi Miss. Escobar, patient called in and is requesting a referral for Home care, patient states its a free service but needs referral for it.  Patient stated the company will provide care for her Pre-diabetes and hypertension.     Company name is [employer]     Caregiver name is Francesca, phone number is [phone]      Any questions please contact patient, thank you !

## 2024-02-08 DIAGNOSIS — Z78.9 NEED FOR FOLLOW-UP BY SOCIAL WORKER: Primary | ICD-10-CM

## 2024-02-09 ENCOUNTER — PATIENT OUTREACH (OUTPATIENT)
Dept: FAMILY MEDICINE CLINIC | Facility: CLINIC | Age: 74
End: 2024-02-09

## 2024-02-09 NOTE — PROGRESS NOTES
"STEVE SILVA received consult from Provider, regarding pt is interesting in Waiver Services.     Per chart review, pt is requesting a referral for Home Care, patient states it is a \"free service but needs a referral for it\". Pt states the company will provide care for her pre-diabetes and hypertension.     STEVE SILVA placed call to pt to follow-up and further assess. Introduced self and role. Pt states she do not need Waiver Services and states she is already working with [facility]. Pt gave verbal consent for STEVE SILVA to contact Francesca to inquire about the referral.    STEVE SILVA placed call Francesca [phone] from [facility]. Introduced self and role. Francesca states she is a RN and they provides 4 hours of free home health care services to pt and they could do medications management, wound car and diabetes education. Francesca states they do not provides Home health aide services, so it is not Waiver services. They only provides 4 hours of services. She states her referral could be place for  and fax to [phone] to patient [facility]. STEVE SILVA verbalized understanding. Francesca provided information of the resources/services they provides. STEVE SILVA thanked Francesca.    STEVE SILVA placed call to pt to follow-up and assure if she don't need Waiver services or other social needs. Pt states she very independent and do not require assistance with her ADL/IDL. Pt states she lives in Senior housing, received SSI and SNAP benefits and she takes the bus for transportation.   Pt states she is aware of the waiver services and know when to apply for services.   Pt states she wants to talk to the doctor about the Metformin side effects. She states she is losing weight and do not wants to take it. Steve SILVA informs pt we have a pharmacist in house that she could meet with and hep with diabetes  education and STEVE SILVA could send him a message and he could call or meet with her. Pt verbalized understanding and is open to the " pharmacist [facility]'s role.     Will sent IB message to the Provider requesting the referral for the HH and to [facility] so he could also follow-up wit pt.     Pt asked if she has to do blood work before the appointment next week. Per chart review pt was ordered blood work. Pt states she will get it done before the appointment. Pt thanked SANFORD SILVA. No further outreach. Will remains available as needed.

## 2024-02-14 ENCOUNTER — HOSPITAL ENCOUNTER (OUTPATIENT)
Dept: MAMMOGRAPHY | Facility: CLINIC | Age: 74
Discharge: HOME | End: 2024-02-14
Payer: MEDICARE

## 2024-02-14 VITALS — HEIGHT: 67 IN | WEIGHT: 148 LBS | BODY MASS INDEX: 23.23 KG/M2

## 2024-02-14 DIAGNOSIS — Z12.31 ENCOUNTER FOR SCREENING MAMMOGRAM FOR BREAST CANCER: ICD-10-CM

## 2024-02-14 PROCEDURE — 77063 BREAST TOMOSYNTHESIS BI: CPT

## 2024-02-14 PROCEDURE — 77067 SCR MAMMO BI INCL CAD: CPT

## 2024-02-16 PROBLEM — B18.2 CHRONIC HEPATITIS C WITHOUT HEPATIC COMA (HCC): Status: ACTIVE | Noted: 2018-04-06

## 2024-02-16 PROBLEM — K62.5 RECTAL BLEEDING: Status: ACTIVE | Noted: 2017-01-27

## 2024-02-16 PROBLEM — I10 HTN (HYPERTENSION): Chronic | Status: ACTIVE | Noted: 2018-01-30

## 2024-02-19 ENCOUNTER — TELEPHONE (OUTPATIENT)
Dept: FAMILY MEDICINE CLINIC | Facility: CLINIC | Age: 74
End: 2024-02-19

## 2024-02-20 ENCOUNTER — TELEPHONE (OUTPATIENT)
Dept: FAMILY MEDICINE CLINIC | Facility: CLINIC | Age: 74
End: 2024-02-20

## 2024-02-20 NOTE — TELEPHONE ENCOUNTER
My name is Cary Gee, date of birth [id number]. I would like to know what time is my appointment with [facility] at on the 27th because they never put it in my shorts so I just wanna know what time. Thank you.      Patient accidentally canceled her 2/27/24 appointment on mychart. Appointment has been scheduled for 2/29/24

## 2024-02-28 ENCOUNTER — APPOINTMENT (OUTPATIENT)
Dept: LAB | Facility: CLINIC | Age: 74
End: 2024-02-28
Payer: MEDICARE

## 2024-02-28 DIAGNOSIS — N18.31 STAGE 3A CHRONIC KIDNEY DISEASE (HCC): ICD-10-CM

## 2024-02-28 DIAGNOSIS — E55.9 VITAMIN D DEFICIENCY: ICD-10-CM

## 2024-02-28 DIAGNOSIS — R73.03 PRE-DIABETES: ICD-10-CM

## 2024-02-28 DIAGNOSIS — D64.9 ANEMIA, UNSPECIFIED TYPE: ICD-10-CM

## 2024-02-28 DIAGNOSIS — N18.30 STAGE 3 CHRONIC KIDNEY DISEASE, UNSPECIFIED WHETHER STAGE 3A OR 3B CKD (HCC): ICD-10-CM

## 2024-02-28 DIAGNOSIS — E78.5 HYPERLIPIDEMIA, UNSPECIFIED HYPERLIPIDEMIA TYPE: ICD-10-CM

## 2024-02-28 DIAGNOSIS — N18.30 BENIGN HYPERTENSION WITH CKD (CHRONIC KIDNEY DISEASE) STAGE III (HCC): ICD-10-CM

## 2024-02-28 DIAGNOSIS — I12.9 BENIGN HYPERTENSION WITH CKD (CHRONIC KIDNEY DISEASE) STAGE III (HCC): ICD-10-CM

## 2024-02-28 LAB
25(OH)D3 SERPL-MCNC: 83.4 NG/ML (ref 30–100)
ALBUMIN SERPL BCP-MCNC: 4.7 G/DL (ref 3.5–5)
ALP SERPL-CCNC: 96 U/L (ref 34–104)
ALT SERPL W P-5'-P-CCNC: 20 U/L (ref 7–52)
ANION GAP SERPL CALCULATED.3IONS-SCNC: 11 MMOL/L
AST SERPL W P-5'-P-CCNC: 18 U/L (ref 13–39)
BASOPHILS # BLD AUTO: 0.09 THOUSANDS/ÂΜL (ref 0–0.1)
BASOPHILS NFR BLD AUTO: 1 % (ref 0–1)
BILIRUB SERPL-MCNC: 0.55 MG/DL (ref 0.2–1)
BUN SERPL-MCNC: 18 MG/DL (ref 5–25)
CALCIUM SERPL-MCNC: 10 MG/DL (ref 8.4–10.2)
CHLORIDE SERPL-SCNC: 97 MMOL/L (ref 96–108)
CHOLEST SERPL-MCNC: 135 MG/DL
CO2 SERPL-SCNC: 33 MMOL/L (ref 21–32)
CREAT SERPL-MCNC: 1.1 MG/DL (ref 0.6–1.3)
EOSINOPHIL # BLD AUTO: 0.07 THOUSAND/ÂΜL (ref 0–0.61)
EOSINOPHIL NFR BLD AUTO: 1 % (ref 0–6)
ERYTHROCYTE [DISTWIDTH] IN BLOOD BY AUTOMATED COUNT: 12.6 % (ref 11.6–15.1)
EST. AVERAGE GLUCOSE BLD GHB EST-MCNC: 120 MG/DL
FERRITIN SERPL-MCNC: 460 NG/ML (ref 11–307)
GFR SERPL CREATININE-BSD FRML MDRD: 49 ML/MIN/1.73SQ M
GLUCOSE P FAST SERPL-MCNC: 109 MG/DL (ref 65–99)
HBA1C MFR BLD: 5.8 %
HCT VFR BLD AUTO: 44 % (ref 34.8–46.1)
HDLC SERPL-MCNC: 48 MG/DL
HGB BLD-MCNC: 13.7 G/DL (ref 11.5–15.4)
IMM GRANULOCYTES # BLD AUTO: 0.03 THOUSAND/UL (ref 0–0.2)
IMM GRANULOCYTES NFR BLD AUTO: 0 % (ref 0–2)
IRON SATN MFR SERPL: 12 % (ref 15–50)
IRON SERPL-MCNC: 34 UG/DL (ref 50–212)
LDLC SERPL CALC-MCNC: 70 MG/DL (ref 0–100)
LYMPHOCYTES # BLD AUTO: 2.54 THOUSANDS/ÂΜL (ref 0.6–4.47)
LYMPHOCYTES NFR BLD AUTO: 21 % (ref 14–44)
MAGNESIUM SERPL-MCNC: 2.1 MG/DL (ref 1.9–2.7)
MCH RBC QN AUTO: 32.1 PG (ref 26.8–34.3)
MCHC RBC AUTO-ENTMCNC: 31.1 G/DL (ref 31.4–37.4)
MCV RBC AUTO: 103 FL (ref 82–98)
MONOCYTES # BLD AUTO: 0.8 THOUSAND/ÂΜL (ref 0.17–1.22)
MONOCYTES NFR BLD AUTO: 7 % (ref 4–12)
NEUTROPHILS # BLD AUTO: 8.46 THOUSANDS/ÂΜL (ref 1.85–7.62)
NEUTS SEG NFR BLD AUTO: 70 % (ref 43–75)
NONHDLC SERPL-MCNC: 87 MG/DL
NRBC BLD AUTO-RTO: 0 /100 WBCS
PHOSPHATE SERPL-MCNC: 3.7 MG/DL (ref 2.3–4.1)
PLATELET # BLD AUTO: 313 THOUSANDS/UL (ref 149–390)
PMV BLD AUTO: 11.9 FL (ref 8.9–12.7)
POTASSIUM SERPL-SCNC: 3.5 MMOL/L (ref 3.5–5.3)
PROT SERPL-MCNC: 7.9 G/DL (ref 6.4–8.4)
RBC # BLD AUTO: 4.27 MILLION/UL (ref 3.81–5.12)
SODIUM SERPL-SCNC: 141 MMOL/L (ref 135–147)
TIBC SERPL-MCNC: 273 UG/DL (ref 250–450)
TRIGL SERPL-MCNC: 86 MG/DL
UIBC SERPL-MCNC: 239 UG/DL (ref 155–355)
WBC # BLD AUTO: 11.99 THOUSAND/UL (ref 4.31–10.16)

## 2024-02-28 PROCEDURE — 82306 VITAMIN D 25 HYDROXY: CPT

## 2024-02-28 PROCEDURE — 80061 LIPID PANEL: CPT

## 2024-02-28 PROCEDURE — 83550 IRON BINDING TEST: CPT

## 2024-02-28 PROCEDURE — 83036 HEMOGLOBIN GLYCOSYLATED A1C: CPT

## 2024-02-28 PROCEDURE — 83735 ASSAY OF MAGNESIUM: CPT

## 2024-02-28 PROCEDURE — 36415 COLL VENOUS BLD VENIPUNCTURE: CPT

## 2024-02-28 PROCEDURE — 83540 ASSAY OF IRON: CPT

## 2024-02-28 PROCEDURE — 82728 ASSAY OF FERRITIN: CPT

## 2024-02-28 PROCEDURE — 80053 COMPREHEN METABOLIC PANEL: CPT

## 2024-02-28 PROCEDURE — 85025 COMPLETE CBC W/AUTO DIFF WBC: CPT

## 2024-02-28 PROCEDURE — 84100 ASSAY OF PHOSPHORUS: CPT

## 2024-02-29 ENCOUNTER — OFFICE VISIT (OUTPATIENT)
Dept: FAMILY MEDICINE CLINIC | Facility: CLINIC | Age: 74
End: 2024-02-29

## 2024-02-29 VITALS
SYSTOLIC BLOOD PRESSURE: 128 MMHG | TEMPERATURE: 98.1 F | BODY MASS INDEX: 21.82 KG/M2 | DIASTOLIC BLOOD PRESSURE: 72 MMHG | OXYGEN SATURATION: 99 % | WEIGHT: 139 LBS | HEART RATE: 64 BPM | HEIGHT: 67 IN | RESPIRATION RATE: 18 BRPM

## 2024-02-29 DIAGNOSIS — M25.50 POLYARTHRALGIA: ICD-10-CM

## 2024-02-29 DIAGNOSIS — R73.03 PRE-DIABETES: ICD-10-CM

## 2024-02-29 DIAGNOSIS — G89.29 CHRONIC LEFT-SIDED LOW BACK PAIN WITH LEFT-SIDED SCIATICA: ICD-10-CM

## 2024-02-29 DIAGNOSIS — N18.30 BENIGN HYPERTENSION WITH CKD (CHRONIC KIDNEY DISEASE) STAGE III (HCC): Primary | ICD-10-CM

## 2024-02-29 DIAGNOSIS — Z28.21 PNEUMOCOCCAL VACCINATION DECLINED: ICD-10-CM

## 2024-02-29 DIAGNOSIS — N18.31 STAGE 3A CHRONIC KIDNEY DISEASE (HCC): ICD-10-CM

## 2024-02-29 DIAGNOSIS — I12.9 BENIGN HYPERTENSION WITH CKD (CHRONIC KIDNEY DISEASE) STAGE III (HCC): Primary | ICD-10-CM

## 2024-02-29 DIAGNOSIS — I12.9 BENIGN HYPERTENSION WITH CKD (CHRONIC KIDNEY DISEASE) STAGE III (HCC): ICD-10-CM

## 2024-02-29 DIAGNOSIS — E78.5 HYPERLIPIDEMIA, UNSPECIFIED HYPERLIPIDEMIA TYPE: ICD-10-CM

## 2024-02-29 DIAGNOSIS — H61.21 IMPACTED CERUMEN OF RIGHT EAR: ICD-10-CM

## 2024-02-29 DIAGNOSIS — I15.9 SECONDARY HYPERTENSION: Chronic | ICD-10-CM

## 2024-02-29 DIAGNOSIS — N18.30 BENIGN HYPERTENSION WITH CKD (CHRONIC KIDNEY DISEASE) STAGE III (HCC): ICD-10-CM

## 2024-02-29 DIAGNOSIS — M54.42 CHRONIC LEFT-SIDED LOW BACK PAIN WITH LEFT-SIDED SCIATICA: ICD-10-CM

## 2024-02-29 PROCEDURE — 99214 OFFICE O/P EST MOD 30 MIN: CPT | Performed by: NURSE PRACTITIONER

## 2024-02-29 PROCEDURE — G2211 COMPLEX E/M VISIT ADD ON: HCPCS | Performed by: NURSE PRACTITIONER

## 2024-02-29 RX ORDER — ATORVASTATIN CALCIUM 20 MG/1
20 TABLET, FILM COATED ORAL DAILY
Qty: 90 TABLET | Refills: 1 | Status: SHIPPED | OUTPATIENT
Start: 2024-02-29

## 2024-02-29 RX ORDER — ENALAPRIL MALEATE AND HYDROCHLOROTHIAZIDE 10; 25 MG/1; MG/1
1 TABLET ORAL DAILY
Qty: 90 TABLET | Refills: 0 | Status: SHIPPED | OUTPATIENT
Start: 2024-02-29 | End: 2024-02-29 | Stop reason: SDUPTHER

## 2024-02-29 RX ORDER — ENALAPRIL MALEATE AND HYDROCHLOROTHIAZIDE 10; 25 MG/1; MG/1
1 TABLET ORAL DAILY
Qty: 90 TABLET | Refills: 0 | Status: SHIPPED | OUTPATIENT
Start: 2024-02-29

## 2024-02-29 NOTE — ASSESSMENT & PLAN NOTE
Lab Results   Component Value Date    EGFR 49 02/28/2024    EGFR 52 05/08/2023    EGFR 54 12/05/2022    CREATININE 1.10 02/28/2024    CREATININE 1.05 05/08/2023    CREATININE 1.02 12/05/2022

## 2024-02-29 NOTE — ASSESSMENT & PLAN NOTE
Lab Results   Component Value Date    CHOLESTEROL 135 02/28/2024    CHOLESTEROL 148 11/01/2023    CHOLESTEROL 120 07/28/2022     Lab Results   Component Value Date    HDL 48 (L) 02/28/2024    HDL 48 (L) 11/01/2023    HDL 44 (L) 07/28/2022     Lab Results   Component Value Date    TRIG 86 02/28/2024    TRIG 93 11/01/2023    TRIG 66 07/28/2022     Lab Results   Component Value Date    NONHDLC 87 02/28/2024    NONHDLC 100 11/01/2023    NONHDLC 76 07/28/2022     Lab Results   Component Value Date    LDLCALC 70 02/28/2024     Continue atorvastatin 20 mg daily

## 2024-02-29 NOTE — PROGRESS NOTES
Name: Cary Gee      : 1950      MRN: [id number]  Encounter Provider: MARISSA Mcpherson  Encounter Date: 2024   Encounter department: [facility]    Assessment & Plan     1. Benign hypertension with CKD (chronic kidney disease) stage III (HCC)  Assessment & Plan:  Lab Results   Component Value Date    EGFR 49 2024    EGFR 52 2023    EGFR 54 2022    CREATININE 1.10 2024    CREATININE 1.05 2023    CREATININE 1.02 2022     Continue follow up with nephrology     Orders:  -     enalapril-hydrochlorothiazide (VASERETIC) 10-25 MG per tablet; Take 1 tablet by mouth daily    2. Stage 3a chronic kidney disease (HCC)  Assessment & Plan:  Lab Results   Component Value Date    EGFR 49 2024    EGFR 52 2023    EGFR 54 2022    CREATININE 1.10 2024    CREATININE 1.05 2023    CREATININE 1.02 2022         3. Secondary hypertension  Assessment & Plan:    BP Readings from Last 3 Encounters:   24 128/72   23 128/84   23 132/78     BP is within goal, continue with current regimen: enalapril/ hctz 10-25 mg daily         4. Pre-diabetes  Assessment & Plan:  Lab Results   Component Value Date    HGBA1C 5.8 (H) 2024     She is prescribed metformin, since A1c is improved D/C medication due to unintentional weight loss         5. Chronic left-sided low back pain with left-sided sciatica  Assessment & Plan:  She previously completed PT   Reports chronic pain with radiation to LLE   We discussed possible referral to pain management, she would like to complete xrays of the left hip and knees prior       6. Hyperlipidemia, unspecified hyperlipidemia type  Assessment & Plan:  Lab Results   Component Value Date    CHOLESTEROL 135 2024    CHOLESTEROL 148 2023    CHOLESTEROL 120 2022     Lab Results   Component Value Date    HDL 48 (L) 2024    HDL 48 (L) 2023    HDL  44 (L) 07/28/2022     Lab Results   Component Value Date    TRIG 86 02/28/2024    TRIG 93 11/01/2023    TRIG 66 07/28/2022     Lab Results   Component Value Date    NONHDLC 87 02/28/2024    NONHDLC 100 11/01/2023    NONHDLC 76 07/28/2022     Lab Results   Component Value Date    LDLCALC 70 02/28/2024     Continue atorvastatin 20 mg daily     Orders:  -     atorvastatin (LIPITOR) 20 mg tablet; Take 1 tablet (20 mg total) by mouth daily    7. Polyarthralgia  -     XR knee 3 vw right non injury; Future; Expected date: 02/29/2024  -     XR knee 3 vw left non injury; Future; Expected date: 02/29/2024  -     XR hip/pelv 2-3 vws left if performed; Future; Expected date: 02/29/2024  -     Diclofenac Sodium (VOLTAREN) 1 %; Apply 2 g topically 4 (four) times a day    8. Impacted cerumen of right ear  -     carbamide peroxide (DEBROX) 6.5 % otic solution; Administer 5 drops to the right ear 2 (two) times a day    9. Pneumococcal vaccination declined           Subjective     72 YO female w/ PMH htn, lower GI bleed, chronic hepatitis C, syncope, hyperlipidemia, CKD stage 3 here for follow up of chronic conditions.     Patient reports that she is having left hip and bilateral knee pain. The pain is typically with weight bearing/ walking. No known injury or trauma.     The following portions of the patient's history were reviewed and updated as appropriate: allergies, current medications, past family history, past medical history, past social history, past surgical history and problem list.      Review of Systems   Constitutional:  Negative for activity change, appetite change, chills, fatigue, fever and unexpected weight change.   HENT:  Negative for hearing loss, nosebleeds, sinus pain, sneezing, sore throat and trouble swallowing.    Eyes:  Negative for photophobia and visual disturbance.   Respiratory:  Negative for cough, chest tightness, shortness of breath and wheezing.    Cardiovascular:  Negative for chest pain,  palpitations and leg swelling.   Gastrointestinal:  Negative for abdominal pain, constipation, nausea and vomiting.   Genitourinary:  Negative for decreased urine volume, difficulty urinating, dysuria, flank pain, genital sores, hematuria and urgency.   Musculoskeletal:  Positive for arthralgias, back pain and myalgias. Negative for gait problem.   Skin:  Negative for pallor, rash and wound.   Neurological:  Negative for dizziness, seizures, syncope, weakness, numbness and headaches.   Hematological:  Negative for adenopathy. Does not bruise/bleed easily.   Psychiatric/Behavioral:  Negative for confusion, hallucinations, self-injury, sleep disturbance and suicidal ideas. The patient is not nervous/anxious.        Past Medical History:   Diagnosis Date    Arthritis     Chronic hepatitis C (HCC)     Diverticulosis of colon     Hx of bleeding disorder     rectal bleeding    Hypertension      Past Surgical History:   Procedure Laterality Date    COLONOSCOPY N/A 3/28/2017    Procedure: COLONOSCOPY;  Surgeon: Seth Smallwood MD;  [facility];  Service:     COLONOSCOPY N/A 3/26/2017    Procedure: COLONOSCOPY with hemostasis clip placement;  Surgeon: Tom Chong MD;  Location[facility];  Service:     COLONOSCOPY N/A 1/27/2017    Procedure: COLONOSCOPY;  Surgeon: Mejia Hannah MD;  [facility];  Service:     COLONOSCOPY N/A 4/7/2017    Procedure: COLONOSCOPY;  Surgeon: Tom Chong MD;  Location[facility];  Service:     ESOPHAGOGASTRODUODENOSCOPY N/A 3/27/2017    Procedure: ESOPHAGOGASTRODUODENOSCOPY (EGD) with APC;  Surgeon: Seth Smallwood MD;  Location: [facility];  Service:     AK SIGMOIDOSCOPY FLX DX W/COLLJ SPEC BR/WA IF PFRMD N/A 3/27/2017    Procedure: SIGMOIDOSCOPY FLEXIBLE;  Surgeon: Seth Smallwood MD;  Location: [facility];  Service: Gastroenterology    TUBAL LIGATION       Family History   Problem Relation Age of Onset    Breast cancer Mother 60    No Known Problems Father     No Known Problems Sister      No Known Problems Sister     No Known Problems Sister     Breast cancer Daughter 49    No Known Problems Maternal Grandmother     No Known Problems Maternal Grandfather     No Known Problems Paternal Grandmother     No Known Problems Paternal Grandfather     No Known Problems Paternal Aunt     Colon cancer Neg Hx     Ovarian cancer Neg Hx     Uterine cancer Neg Hx      Social History     Socioeconomic History    Marital status:      Spouse name: None    Number of children: None    Years of education: None    Highest education level: None   Occupational History    None   Tobacco Use    Smoking status: Never    Smokeless tobacco: Never   Vaping Use    Vaping status: Never Used   Substance and Sexual Activity    Alcohol use: No    Drug use: No    Sexual activity: Not Currently     Partners: Male     Birth control/protection: None     Comment: last sexually active 2012   Other Topics Concern    None   Social History Narrative    Taoism    Patient unsure of advance healthcare directive status     Social Determinants of Health     Financial Resource Strain: Low Risk  (5/11/2023)    Overall Financial Resource Strain (CARDIA)     Difficulty of Paying Living Expenses: Not hard at all   Food Insecurity: No Food Insecurity (5/11/2023)    Hunger Vital Sign     Worried About Running Out of Food in the Last Year: Never true     Ran Out of Food in the Last Year: Never true   Transportation Needs: No Transportation Needs (5/11/2023)    PRAPARE - Transportation     Lack of Transportation (Medical): No     Lack of Transportation (Non-Medical): No   Physical Activity: Not on file   Stress: Not on file   Social Connections: Not on file   Intimate Partner Violence: Not on file   Housing Stability: Not on file     Current Outpatient Medications on File Prior to Visit   Medication Sig    cholecalciferol (VITAMIN D3) 1,000 units tablet Take 1,000 Units by mouth daily    ferrous gluconate (FERGON) 324 mg tablet Take 1  "tablet by mouth 2 (two) times a day before meals for 30 days    potassium chloride (Klor-Con M20) 20 mEq tablet Take 1 tablet (20 mEq total) by mouth daily    tiZANidine (ZANAFLEX) 2 mg tablet Take 1 tablet (2 mg total) by mouth every 8 (eight) hours as needed for muscle spasms    [DISCONTINUED] atorvastatin (LIPITOR) 20 mg tablet TAKE 1 TABLET BY MOUTH EVERY DAY    [DISCONTINUED] enalapril-hydrochlorothiazide (VASERETIC) 10-25 MG per tablet Take 1 tablet by mouth daily    [DISCONTINUED] metFORMIN (GLUCOPHAGE) 500 mg tablet Take 1 tablet (500 mg total) by mouth daily with breakfast    [DISCONTINUED] omeprazole (PriLOSEC) 20 mg delayed release capsule Take 20 mg by mouth daily     No Known Allergies  Immunization History   Administered Date(s) Administered    COVID-19 PFIZER VACCINE 0.3 ML IM 09/18/2021, 10/09/2021    Hep B, adult 06/18/2018, 08/06/2018, 12/24/2018       Objective     /72 (BP Location: Left arm, Patient Position: Sitting, Cuff Size: Standard)   Pulse 64   Temp 98.1 °F (36.7 °C) (Temporal)   Resp 18   Ht 5' 7\" (1.702 m)   Wt 63 kg (139 lb)   SpO2 99%   BMI 21.77 kg/m²     Physical Exam  Vitals and nursing note reviewed.   Constitutional:       General: She is not in acute distress.     Appearance: She is well-developed. She is not diaphoretic.   HENT:      Head: Normocephalic and atraumatic.      Right Ear: External ear normal.      Left Ear: External ear normal.   Eyes:      General:         Right eye: No discharge.         Left eye: No discharge.      Conjunctiva/sclera: Conjunctivae normal.   Cardiovascular:      Rate and Rhythm: Normal rate and regular rhythm.   Pulmonary:      Effort: Pulmonary effort is normal. No respiratory distress.      Breath sounds: Normal breath sounds. No wheezing.   Abdominal:      General: Bowel sounds are normal. There is no distension.      Palpations: Abdomen is soft.      Tenderness: There is no abdominal tenderness.   Musculoskeletal:         General: " No deformity.      Cervical back: Normal range of motion and neck supple.      Right hip: No tenderness. Normal range of motion. Normal strength.      Left hip: No tenderness. Normal range of motion. Normal strength.      Right knee: Swelling and effusion present. Normal range of motion.      Left knee: Swelling and effusion present. Normal range of motion.   Lymphadenopathy:      Cervical: No cervical adenopathy.   Skin:     General: Skin is warm and dry.      Capillary Refill: Capillary refill takes less than 2 seconds.      Findings: No rash.   Neurological:      General: No focal deficit present.      Mental Status: She is alert and oriented to person, place, and time.      Sensory: No sensory deficit.      Coordination: Coordination normal.      Deep Tendon Reflexes: Reflexes normal.   Psychiatric:         Mood and Affect: Mood normal.         Behavior: Behavior normal.     MARISSA Mcpherson

## 2024-02-29 NOTE — ASSESSMENT & PLAN NOTE
Lab Results   Component Value Date    HGBA1C 5.8 (H) 02/28/2024     She is prescribed metformin, since A1c is improved D/C medication due to unintentional weight loss

## 2024-02-29 NOTE — ASSESSMENT & PLAN NOTE
Lab Results   Component Value Date    EGFR 49 02/28/2024    EGFR 52 05/08/2023    EGFR 54 12/05/2022    CREATININE 1.10 02/28/2024    CREATININE 1.05 05/08/2023    CREATININE 1.02 12/05/2022     Continue follow up with nephrology

## 2024-02-29 NOTE — ASSESSMENT & PLAN NOTE
BP Readings from Last 3 Encounters:   02/29/24 128/72   12/05/23 128/84   11/01/23 132/78     BP is within goal, continue with current regimen: enalapril/ hctz 10-25 mg daily

## 2024-02-29 NOTE — ASSESSMENT & PLAN NOTE
She previously completed PT   Reports chronic pain with radiation to LLE   We discussed possible referral to pain management, she would like to complete xrays of the left hip and knees prior

## 2024-03-01 ENCOUNTER — TELEPHONE (OUTPATIENT)
Dept: FAMILY MEDICINE CLINIC | Facility: CLINIC | Age: 74
End: 2024-03-01

## 2024-03-01 NOTE — TELEPHONE ENCOUNTER
PCP SIGNATURE NEEDED FOR [facility] FOR[facility] RECEIVED VIA FAX AND PLACED IN PCP FOLDER TO BE DELIVERED AT ASSIGNED TIMES.      HOME HEALTH PLAN OF CARE & CERTIFICATION

## 2024-03-11 ENCOUNTER — HOSPITAL ENCOUNTER (OUTPATIENT)
Dept: RADIOLOGY | Facility: HOSPITAL | Age: 74
Discharge: HOME | End: 2024-03-11
Payer: MEDICARE

## 2024-03-11 DIAGNOSIS — M25.50 POLYARTHRALGIA: ICD-10-CM

## 2024-03-11 PROCEDURE — 73562 X-RAY EXAM OF KNEE 3: CPT

## 2024-03-11 PROCEDURE — 73502 X-RAY EXAM HIP UNI 2-3 VIEWS: CPT

## 2024-03-12 ENCOUNTER — TELEPHONE (OUTPATIENT)
Dept: FAMILY MEDICINE CLINIC | Facility: CLINIC | Age: 74
End: 2024-03-12

## 2024-03-12 DIAGNOSIS — N18.30 BENIGN HYPERTENSION WITH CKD (CHRONIC KIDNEY DISEASE) STAGE III (HCC): Primary | ICD-10-CM

## 2024-03-12 DIAGNOSIS — I12.9 BENIGN HYPERTENSION WITH CKD (CHRONIC KIDNEY DISEASE) STAGE III (HCC): Primary | ICD-10-CM

## 2024-03-12 RX ORDER — ENALAPRIL MALEATE 10 MG/1
10 TABLET ORAL DAILY
Qty: 30 TABLET | Refills: 5 | Status: SHIPPED | OUTPATIENT
Start: 2024-03-12

## 2024-03-12 RX ORDER — HYDROCHLOROTHIAZIDE 25 MG/1
25 TABLET ORAL DAILY
Qty: 30 TABLET | Refills: 5 | Status: SHIPPED | OUTPATIENT
Start: 2024-03-12 | End: 2024-09-08

## 2024-03-12 NOTE — TELEPHONE ENCOUNTER
I called pt and she states following meds are out of stock enalapril-hydrochlorothiazide (VASERETIC) 10-25 MG per tablet   Pt is requesting to have meds sent separately but is asking if enalapril can be 20 mg instead of 10.    Please advise,  Thank you

## 2024-03-12 NOTE — TELEPHONE ENCOUNTER
Pt asks to send replacement medication to pharmacy for enalapril-hydrochlorothiazide 10-25 MG per tablet

## 2024-03-12 NOTE — TELEPHONE ENCOUNTER
Enalapril 20mg   hydrochlorothiazide 25   Patient requested medication , states she wants it separate

## 2024-03-12 NOTE — TELEPHONE ENCOUNTER
Medication enalapril-hydrochlorothiazide (VASERETIC) 10-25 MG per tablet [[id number]]    Is out of stock in all pharmacies per pt.   Pt is asking to go back on the way she was taking then before separate. Pt states she has been out of medication for a week now.

## 2024-04-08 DIAGNOSIS — N18.30 BENIGN HYPERTENSION WITH CKD (CHRONIC KIDNEY DISEASE) STAGE III (HCC): ICD-10-CM

## 2024-04-08 DIAGNOSIS — I12.9 BENIGN HYPERTENSION WITH CKD (CHRONIC KIDNEY DISEASE) STAGE III (HCC): ICD-10-CM

## 2024-04-08 RX ORDER — ENALAPRIL MALEATE 10 MG/1
10 TABLET ORAL DAILY
Qty: 30 TABLET | Refills: 0 | Status: SHIPPED | OUTPATIENT
Start: 2024-04-08

## 2024-04-08 RX ORDER — HYDROCHLOROTHIAZIDE 25 MG/1
25 TABLET ORAL DAILY
Qty: 30 TABLET | Refills: 0 | Status: SHIPPED | OUTPATIENT
Start: 2024-04-08 | End: 2024-10-05

## 2024-04-23 ENCOUNTER — TELEPHONE (OUTPATIENT)
Dept: FAMILY MEDICINE CLINIC | Facility: CLINIC | Age: 74
End: 2024-04-23

## 2024-04-23 NOTE — TELEPHONE ENCOUNTER
PCP SIGNATURE NEEDED FOR [facility] FORM RECEIVED VIA FAX AND PLACED IN PCP FOLDER TO BE DELIVERED AT ASSIGNED TIMES.          Home Health Plan of care & Certification 04/21/24-06/19/2024

## 2024-04-29 ENCOUNTER — TELEPHONE (OUTPATIENT)
Dept: NEPHROLOGY | Facility: CLINIC | Age: 74
End: 2024-04-29

## 2024-04-29 NOTE — TELEPHONE ENCOUNTER
Called pt and LVM asking her to call office to reschedule cancelled follow up with Dr Donis in the [facility]. Appt was cancelled via My Chart.

## 2024-04-30 DIAGNOSIS — I12.9 BENIGN HYPERTENSION WITH CKD (CHRONIC KIDNEY DISEASE) STAGE III (HCC): ICD-10-CM

## 2024-04-30 DIAGNOSIS — N18.30 BENIGN HYPERTENSION WITH CKD (CHRONIC KIDNEY DISEASE) STAGE III (HCC): ICD-10-CM

## 2024-04-30 RX ORDER — HYDROCHLOROTHIAZIDE 25 MG/1
25 TABLET ORAL DAILY
Qty: 90 TABLET | Refills: 1 | Status: SHIPPED | OUTPATIENT
Start: 2024-04-30 | End: 2024-10-27

## 2024-04-30 NOTE — TELEPHONE ENCOUNTER
FAXED ON 04/30/24 TO [facility] at [phone]. FAX CONFIRMATION RECEIVED.

## 2024-05-01 DIAGNOSIS — I12.9 BENIGN HYPERTENSION WITH CKD (CHRONIC KIDNEY DISEASE) STAGE III (HCC): ICD-10-CM

## 2024-05-01 DIAGNOSIS — N18.30 BENIGN HYPERTENSION WITH CKD (CHRONIC KIDNEY DISEASE) STAGE III (HCC): ICD-10-CM

## 2024-05-01 RX ORDER — ENALAPRIL MALEATE 10 MG/1
10 TABLET ORAL DAILY
Qty: 90 TABLET | Refills: 1 | Status: SHIPPED | OUTPATIENT
Start: 2024-05-01

## 2024-05-29 ENCOUNTER — OFFICE VISIT (OUTPATIENT)
Dept: FAMILY MEDICINE CLINIC | Facility: CLINIC | Age: 74
End: 2024-05-29

## 2024-05-29 VITALS
BODY MASS INDEX: 22.6 KG/M2 | HEIGHT: 67 IN | RESPIRATION RATE: 18 BRPM | WEIGHT: 144 LBS | OXYGEN SATURATION: 99 % | TEMPERATURE: 98.4 F | DIASTOLIC BLOOD PRESSURE: 76 MMHG | HEART RATE: 82 BPM | SYSTOLIC BLOOD PRESSURE: 118 MMHG

## 2024-05-29 DIAGNOSIS — E78.5 HYPERLIPIDEMIA, UNSPECIFIED HYPERLIPIDEMIA TYPE: ICD-10-CM

## 2024-05-29 DIAGNOSIS — N18.30 BENIGN HYPERTENSION WITH CKD (CHRONIC KIDNEY DISEASE) STAGE III (HCC): ICD-10-CM

## 2024-05-29 DIAGNOSIS — G47.09 OTHER INSOMNIA: Primary | ICD-10-CM

## 2024-05-29 DIAGNOSIS — M25.50 POLYARTHRALGIA: ICD-10-CM

## 2024-05-29 DIAGNOSIS — I15.9 SECONDARY HYPERTENSION: Chronic | ICD-10-CM

## 2024-05-29 DIAGNOSIS — I12.9 BENIGN HYPERTENSION WITH CKD (CHRONIC KIDNEY DISEASE) STAGE III (HCC): ICD-10-CM

## 2024-05-29 DIAGNOSIS — Z12.11 COLON CANCER SCREENING: ICD-10-CM

## 2024-05-29 DIAGNOSIS — N18.31 STAGE 3A CHRONIC KIDNEY DISEASE (HCC): ICD-10-CM

## 2024-05-29 DIAGNOSIS — Z12.31 BREAST CANCER SCREENING BY MAMMOGRAM: ICD-10-CM

## 2024-05-29 DIAGNOSIS — R73.03 PRE-DIABETES: ICD-10-CM

## 2024-05-29 PROCEDURE — G2211 COMPLEX E/M VISIT ADD ON: HCPCS | Performed by: NURSE PRACTITIONER

## 2024-05-29 PROCEDURE — 99214 OFFICE O/P EST MOD 30 MIN: CPT | Performed by: NURSE PRACTITIONER

## 2024-05-29 RX ORDER — ENALAPRIL MALEATE 10 MG/1
10 TABLET ORAL DAILY
Qty: 90 TABLET | Refills: 1 | Status: SHIPPED | OUTPATIENT
Start: 2024-05-29

## 2024-05-29 RX ORDER — HYDROCHLOROTHIAZIDE 25 MG/1
25 TABLET ORAL DAILY
Qty: 90 TABLET | Refills: 1 | Status: SHIPPED | OUTPATIENT
Start: 2024-05-29 | End: 2024-11-25

## 2024-05-29 RX ORDER — MIRTAZAPINE 7.5 MG/1
7.5 TABLET, FILM COATED ORAL
Qty: 30 TABLET | Refills: 5 | Status: SHIPPED | OUTPATIENT
Start: 2024-05-29

## 2024-05-29 RX ORDER — MELATONIN
1000 DAILY
Qty: 90 TABLET | Refills: 3 | Status: SHIPPED | OUTPATIENT
Start: 2024-05-29

## 2024-05-29 RX ORDER — ATORVASTATIN CALCIUM 20 MG/1
20 TABLET, FILM COATED ORAL DAILY
Qty: 90 TABLET | Refills: 1 | Status: SHIPPED | OUTPATIENT
Start: 2024-05-29

## 2024-05-29 NOTE — ASSESSMENT & PLAN NOTE
Difficulty staying asleep  Reviewed Sleep hygiene - decrease fluids before bed, do not take naps during the day  Trial low dose mirtazapine, discussed expected outcomes and reviewed possible side effects of medication  Follow up in 6 weeks

## 2024-05-29 NOTE — ASSESSMENT & PLAN NOTE
Lab Results   Component Value Date    EGFR 49 02/28/2024    EGFR 52 05/08/2023    EGFR 54 12/05/2022    CREATININE 1.10 02/28/2024    CREATININE 1.05 05/08/2023    CREATININE 1.02 12/05/2022     Continue follow up with nephrology  Avoid nephrotoxic drugs   Avoid nsaids

## 2024-05-29 NOTE — PROGRESS NOTES
Ambulatory Visit  Name: Cary Gee      : 1950      MRN: [id number]  Encounter Provider: MARISSA Mcpherson  Encounter Date: 2024   Encounter department: [facility]    Assessment & Plan   1. Other insomnia  Assessment & Plan:  Difficulty staying asleep  Reviewed Sleep hygiene - decrease fluids before bed, do not take naps during the day  Trial low dose mirtazapine, discussed expected outcomes and reviewed possible side effects of medication  Follow up in 6 weeks   Orders:  -     mirtazapine (REMERON) 7.5 MG tablet; Take 1 tablet (7.5 mg total) by mouth daily at bedtime  2. Polyarthralgia  -     Diclofenac Sodium (VOLTAREN) 1 %; Apply 2 g topically 4 (four) times a day  -     cholecalciferol (VITAMIN D3) 1,000 units tablet; Take 1 tablet (1,000 Units total) by mouth daily  3. Benign hypertension with CKD (chronic kidney disease) stage III (HCC)  Assessment & Plan:  Lab Results   Component Value Date    EGFR 49 2024    EGFR 52 2023    EGFR 54 2022    CREATININE 1.10 2024    CREATININE 1.05 2023    CREATININE 1.02 2022     Continue follow up with nephrology  Avoid nephrotoxic drugs   Avoid nsaids    Orders:  -     enalapril (VASOTEC) 10 mg tablet; Take 1 tablet (10 mg total) by mouth daily  -     hydroCHLOROthiazide 25 mg tablet; Take 1 tablet (25 mg total) by mouth daily  4. Hyperlipidemia, unspecified hyperlipidemia type  Assessment & Plan:  Lab Results   Component Value Date    CHOLESTEROL 135 2024    CHOLESTEROL 148 2023    CHOLESTEROL 120 2022     Lab Results   Component Value Date    HDL 48 (L) 2024    HDL 48 (L) 2023    HDL 44 (L) 2022     Lab Results   Component Value Date    TRIG 86 2024    TRIG 93 2023    TRIG 66 2022     Lab Results   Component Value Date    NONHDLC 87 2024    NONHDLC 100 2023    NONHDLC 76 2022     Lab Results   Component  Value Date    LDLCALC 70 02/28/2024     Continue atorvastatin 20 mg daily   Orders:  -     atorvastatin (LIPITOR) 20 mg tablet; Take 1 tablet (20 mg total) by mouth daily  5. Breast cancer screening by mammogram  -     Mammo screening bilateral w 3d & cad; Future  6. Colon cancer screening  -     Ambulatory Referral to Gastroenterology; Future  7. Secondary hypertension  Assessment & Plan:  Bp is within goal, continue HCTZ 25 mg daily and enalapril 10 mg daily   8. Stage 3a chronic kidney disease (HCC)  Assessment & Plan:  Lab Results   Component Value Date    EGFR 49 02/28/2024    EGFR 52 05/08/2023    EGFR 54 12/05/2022    CREATININE 1.10 02/28/2024    CREATININE 1.05 05/08/2023    CREATININE 1.02 12/05/2022     9. Pre-diabetes  Assessment & Plan:  Lab Results   Component Value Date    HGBA1C 5.8 (H) 02/28/2024     Diet controlled  Repeat A1c next visit         Depression Screening and Follow-up Plan: Patient was screened for depression during today's encounter. They screened negative with a PHQ-2 score of 0.        History of Present Illness     75 YO female w/ PMH htn, lower GI bleed, chronic hepatitis C, syncope, hyperlipidemia, CKD stage 3 here for follow up of chronic conditions.     Patient reports that she is having difficulty with sleep. She is only sleeping about 3 hours, wakes up and usually can't go back to sleep. Sometimes takes a nap during the day.     The following portions of the patient's history were reviewed and updated as appropriate: allergies, current medications, past family history, past medical history, past social history, past surgical history and problem list.        Review of Systems   Constitutional:  Negative for activity change, appetite change, chills, fatigue, fever and unexpected weight change.   HENT:  Negative for hearing loss, nosebleeds, sinus pain, sneezing, sore throat and trouble swallowing.    Eyes:  Negative for photophobia and visual disturbance.   Respiratory:   Negative for cough, chest tightness, shortness of breath and wheezing.    Cardiovascular:  Negative for chest pain, palpitations and leg swelling.   Gastrointestinal:  Negative for abdominal pain, constipation, nausea and vomiting.   Genitourinary:  Negative for decreased urine volume, difficulty urinating, dysuria, flank pain, genital sores, hematuria and urgency.   Musculoskeletal:  Positive for arthralgias, back pain and myalgias. Negative for gait problem.   Skin:  Negative for pallor, rash and wound.   Neurological:  Negative for dizziness, seizures, syncope, weakness, numbness and headaches.   Hematological:  Negative for adenopathy. Does not bruise/bleed easily.   Psychiatric/Behavioral:  Positive for sleep disturbance. Negative for confusion, hallucinations, self-injury and suicidal ideas. The patient is not nervous/anxious.      Past Medical History:   Diagnosis Date    Arthritis     Chronic hepatitis C (HCC)     Diverticulosis of colon     Hx of bleeding disorder     rectal bleeding    Hypertension      Past Surgical History:   Procedure Laterality Date    COLONOSCOPY N/A 3/28/2017    Procedure: COLONOSCOPY;  Surgeon: Seth Smallwood MD;  Location: [facility];  Service:     COLONOSCOPY N/A 3/26/2017    Procedure: COLONOSCOPY with hemostasis clip placement;  Surgeon: Tom Chong MD;  [facility];  Service:     COLONOSCOPY N/A 1/27/2017    Procedure: COLONOSCOPY;  Surgeon: Mejia Hannah MD;  [facility];  Service:     COLONOSCOPY N/A 4/7/2017    Procedure: COLONOSCOPY;  Surgeon: Tom Chong MD;  [facility];  Service:     ESOPHAGOGASTRODUODENOSCOPY N/A 3/27/2017    Procedure: ESOPHAGOGASTRODUODENOSCOPY (EGD) with APC;  Surgeon: Seth Smallwood MD;  [facility];  Service:     SC SIGMOIDOSCOPY FLX DX W/COLLJ SPEC BR/WA IF PFRMD N/A 3/27/2017    Procedure: SIGMOIDOSCOPY FLEXIBLE;  Surgeon: Seth Smallwood MD;  Location: [facility];  Service: Gastroenterology    TUBAL LIGATION       Family  History   Problem Relation Age of Onset    Breast cancer Mother 60    No Known Problems Father     No Known Problems Sister     No Known Problems Sister     No Known Problems Sister     Breast cancer Daughter 49    No Known Problems Maternal Grandmother     No Known Problems Maternal Grandfather     No Known Problems Paternal Grandmother     No Known Problems Paternal Grandfather     No Known Problems Paternal Aunt     Colon cancer Neg Hx     Ovarian cancer Neg Hx     Uterine cancer Neg Hx      Social History     Tobacco Use    Smoking status: Never     Passive exposure: Never    Smokeless tobacco: Never   Vaping Use    Vaping status: Never Used   Substance and Sexual Activity    Alcohol use: No    Drug use: No    Sexual activity: Not Currently     Partners: Male     Birth control/protection: None     Comment: last sexually active 2012     Current Outpatient Medications on File Prior to Visit   Medication Sig    carbamide peroxide (DEBROX) 6.5 % otic solution Administer 5 drops to the right ear 2 (two) times a day    ferrous gluconate (FERGON) 324 mg tablet Take 1 tablet by mouth 2 (two) times a day before meals for 30 days    potassium chloride (Klor-Con M20) 20 mEq tablet Take 1 tablet (20 mEq total) by mouth daily    tiZANidine (ZANAFLEX) 2 mg tablet Take 1 tablet (2 mg total) by mouth every 8 (eight) hours as needed for muscle spasms    [DISCONTINUED] atorvastatin (LIPITOR) 20 mg tablet Take 1 tablet (20 mg total) by mouth daily    [DISCONTINUED] cholecalciferol (VITAMIN D3) 1,000 units tablet Take 1,000 Units by mouth daily    [DISCONTINUED] Diclofenac Sodium (VOLTAREN) 1 % Apply 2 g topically 4 (four) times a day    [DISCONTINUED] enalapril (VASOTEC) 10 mg tablet TAKE 1 TABLET BY MOUTH EVERY DAY    [DISCONTINUED] hydroCHLOROthiazide 25 mg tablet TAKE 1 TABLET (25 MG TOTAL) BY MOUTH DAILY.    [DISCONTINUED] omeprazole (PriLOSEC) 20 mg delayed release capsule Take 20 mg by mouth daily     No Known  "Allergies  Immunization History   Administered Date(s) Administered    COVID-19 PFIZER VACCINE 0.3 ML IM 09/18/2021, 10/09/2021    Hep B, adult 06/18/2018, 08/06/2018, 12/24/2018     Objective     /76 (BP Location: Right arm, Patient Position: Sitting, Cuff Size: Standard)   Pulse 82   Temp 98.4 °F (36.9 °C) (Temporal)   Resp 18   Ht 5' 7\" (1.702 m)   Wt 65.3 kg (144 lb)   SpO2 99%   BMI 22.55 kg/m²     Physical Exam  Vitals and nursing note reviewed.   Constitutional:       General: She is not in acute distress.     Appearance: She is well-developed. She is not ill-appearing.   HENT:      Head: Normocephalic and atraumatic.      Right Ear: External ear normal.      Left Ear: External ear normal.      Mouth/Throat:      Mouth: Mucous membranes are moist.   Eyes:      General:         Right eye: No discharge.         Left eye: No discharge.      Conjunctiva/sclera: Conjunctivae normal.      Pupils: Pupils are equal, round, and reactive to light.   Cardiovascular:      Rate and Rhythm: Normal rate and regular rhythm.   Pulmonary:      Effort: Pulmonary effort is normal. No respiratory distress.      Breath sounds: Normal breath sounds.   Abdominal:      Palpations: Abdomen is soft.      Tenderness: There is no abdominal tenderness.   Musculoskeletal:         General: No swelling.      Cervical back: Neck supple.   Skin:     General: Skin is warm and dry.      Capillary Refill: Capillary refill takes less than 2 seconds.   Neurological:      General: No focal deficit present.      Mental Status: She is alert and oriented to person, place, and time.   Psychiatric:         Mood and Affect: Mood normal.       Administrative Statements         "

## 2024-05-29 NOTE — PATIENT INSTRUCTIONS
Meal Planning with the Plate Method   AMBULATORY CARE:   Meal planning with the plate method  is a way for people with diabetes to plan meals. The plate method can help you eat the right amount of carbohydrates and keep your blood sugar levels under control. Carbohydrates naturally raise your blood sugar level. Your blood sugar level can rise too high if you eat too much carbohydrate at one time. Carbohydrates are found in starches (bread, cereal, starchy vegetables, and beans), fruit, milk, yogurt, and sweets.  How to use the plate method to plan meals:   Draw an imaginary line down the middle of a 9-inch dinner plate.  On one side, draw another line to divide that section in half. Your plate will have 3 sections.    Fill the largest section of your plate with non-starchy vegetables.  These include broccoli, spinach, cucumbers, peppers, cauliflower, and tomatoes.    Add a carbohydrate to 1 of the small sections of your plate.  Examples are pasta, rice, whole-grain bread, tortillas, corn, potatoes, and beans. Your plan may allow a serving of low-fat dairy or fruit for a carbohydrate.    Add meat or another source of protein to the other small section of your plate.  Examples include chicken or turkey without skin, fish, lean beef or pork, low-fat cheese, tofu, or eggs.         Add a low-calorie or calorie-free drink.  Examples include water or unsweetened tea or coffee.       Serving sizes of foods:   Non-starchy vegetables:      ½ cup of cooked vegetables or 1 cup of raw vegetables    ½ cup of vegetable juice    Starches:      1 ounce of whole-wheat bread or 1 small (6 inch) flour or corn tortilla    1 small (4 inch) pancake (about ¼ inch thick)    ¾ cup of dry, unsweetened, whole-grain ready-to-eat cereal or ¼ cup of low-fat granola    ½ cup of cooked cereal or oatmeal    ? cup of rice or pasta    ½ cup of corn, green peas, sweet potatoes, or mashed potatoes    ½ cup of cooked beans and peas (ria bates,  kidney, white, split, black-eyed)    Meat and other protein sources:      3 to 4 ounces of any lean meat, fish, or poultry    ½ cup of tofu or tempeh    1 large egg    1½ ounces (about 2 tablespoons) of nuts or 2 tablespoons of peanut butter    Fruit:      1 small piece of fresh fruit     ½ cup of canned or fresh fruit or unsweetened fruit juice    ¼ cup of dried fruit    Milk and yogurt:      1 cup (8 ounces) of skim or 1% milk    ¾ cup (6 ounces) of plain, non-fat yogurt    Other healthy nutrition tips:   Limit salt and sugar.  Choose and prepare foods and drinks with less salt and added sugars. Use the nutrition information on food labels to help you make healthy choices. The percent daily value listed on the food label tells you whether a food is low or high in certain nutrients. A percent daily value of 5% or less means that the food is low in a nutrient. A percent daily value of 20% or more means that the food is high in a nutrient.         Choose healthy fats.  Choose healthy fats such as polyunsaturated and monounsaturated fats in place of unhealthy fats. Healthy fats are found in vegetable oils such as soybean, corn, canola, olive, and sunflower oil. Unhealthy fats are saturated fats, trans fats, and cholesterol. Unhealthy fats are found in shortening, butter, stick margarine, and animal fat.         Ask your healthcare provider if alcohol is okay for you.  Generally, men 65 or older and women should limit alcohol to 1 drink within 24 hours and 7 within 1 week. Men 21 to 64 years should limit alcohol to 2 drinks a day and 14 within 1 week. Your provider can tell you how many drinks are okay for you within 24 hours or within 1 week. A drink of alcohol is 12 ounces of beer, 5 ounces of wine, or 1½ ounces of liquor. Always have food when you drink alcohol. Your blood sugar may fall to a low level if you drink when your stomach is empty.    © Copyright [employer] 2023 Information is for End User's use only and  may not be sold, redistributed or otherwise used for commercial purposes.  The above information is an  only. It is not intended as medical advice for individual conditions or treatments. Talk to your doctor, nurse or pharmacist before following any medical regimen to see if it is safe and effective for you.

## 2024-05-29 NOTE — ASSESSMENT & PLAN NOTE
Lab Results   Component Value Date    HGBA1C 5.8 (H) 02/28/2024     Diet controlled  Repeat A1c next visit

## 2024-06-21 DIAGNOSIS — G47.09 OTHER INSOMNIA: ICD-10-CM

## 2024-06-21 RX ORDER — MIRTAZAPINE 7.5 MG/1
7.5 TABLET, FILM COATED ORAL
Qty: 90 TABLET | Refills: 2 | Status: SHIPPED | OUTPATIENT
Start: 2024-06-21

## 2024-10-20 ENCOUNTER — HOSPITAL ENCOUNTER (EMERGENCY)
Facility: HOSPITAL | Age: 74
Discharge: HOME | End: 2024-10-20
Attending: EMERGENCY MEDICINE
Payer: MEDICARE

## 2024-10-20 VITALS
HEART RATE: 89 BPM | BODY MASS INDEX: 23.63 KG/M2 | SYSTOLIC BLOOD PRESSURE: 151 MMHG | WEIGHT: 150.57 LBS | TEMPERATURE: 98.2 F | OXYGEN SATURATION: 99 % | HEIGHT: 67 IN | RESPIRATION RATE: 20 BRPM | DIASTOLIC BLOOD PRESSURE: 75 MMHG

## 2024-10-20 DIAGNOSIS — N36.8 URETHRAL PROLAPSE: Primary | ICD-10-CM

## 2024-10-20 DIAGNOSIS — R31.29 MICROSCOPIC HEMATURIA: ICD-10-CM

## 2024-10-20 LAB
BACTERIA UR QL AUTO: ABNORMAL /HPF
BILIRUB UR QL STRIP: NEGATIVE
CLARITY UR: CLEAR
COLOR UR: YELLOW
GLUCOSE UR STRIP-MCNC: NEGATIVE MG/DL
HGB UR QL STRIP.AUTO: ABNORMAL
HYALINE CASTS #/AREA URNS LPF: ABNORMAL /LPF
KETONES UR STRIP-MCNC: NEGATIVE MG/DL
LEUKOCYTE ESTERASE UR QL STRIP: ABNORMAL
MUCOUS THREADS UR QL AUTO: ABNORMAL
NITRITE UR QL STRIP: NEGATIVE
NON-SQ EPI CELLS URNS QL MICRO: ABNORMAL /HPF
PH UR STRIP.AUTO: 6 [PH] (ref 4.5–8)
PROT UR STRIP-MCNC: NEGATIVE MG/DL
RBC #/AREA URNS AUTO: ABNORMAL /HPF
SP GR UR STRIP.AUTO: 1.02 (ref 1–1.03)
UROBILINOGEN UR QL STRIP.AUTO: 0.2 E.U./DL
WBC #/AREA URNS AUTO: ABNORMAL /HPF

## 2024-10-20 PROCEDURE — 81001 URINALYSIS AUTO W/SCOPE: CPT

## 2024-10-20 PROCEDURE — 99284 EMERGENCY DEPT VISIT MOD MDM: CPT | Performed by: EMERGENCY MEDICINE

## 2024-10-20 PROCEDURE — 99283 EMERGENCY DEPT VISIT LOW MDM: CPT

## 2024-10-20 NOTE — DISCHARGE INSTRUCTIONS
Urethral Prolapse    Urethral prolapse is when the inner lining of your urethra (the hole you pee from) sticks out. The condition mostly affects postmenopausal people and prepubescent people who were assigned female at birth (AFAB). Symptoms may include pelvic pain, vaginal bleeding or pain when you pee. Treatment may be surgical or nonsurgical.    You should follow up with a Urogynecologist for further evaluation of the urethral prolapse.  You can call the number listed above or discuss it with your Gynecologist when you follow up with them during your early November visit.

## 2024-10-20 NOTE — ED PROVIDER NOTES
"Time reflects when diagnosis was documented in both MDM as applicable and the Disposition within this note       Time User Action Codes Description Comment    10/20/2024  6:09 PM Elizabeth Wheat Add [N36.8] Urethral prolapse     10/20/2024  6:13 PM Elizabeth Wheat Add [R31.29] Microscopic hematuria           ED Disposition       ED Disposition   Discharge    Condition   Stable    Date/Time   Sun Oct 20, 2024  6:08 PM    Comment   Cary Gerard discharge to home/self care.                   Assessment & Plan       Medical Decision Making  Dysuria w/ no hx of recurrent UTI and a \"lump\" in the vaginal region. Will get UA to r/o infection.  PE c/w urethral prolapse - will refer to urogyn and tx for UTI if present    Problems Addressed:  Microscopic hematuria: acute illness or injury  Urethral prolapse: undiagnosed new problem with uncertain prognosis    Amount and/or Complexity of Data Reviewed  External Data Reviewed: notes.     Details: Outpt gyn notes reviewed  Labs: ordered.             Medications - No data to display    ED Risk Strat Scores                           SBIRT 22yo+      Flowsheet Row Most Recent Value   Initial Alcohol Screen: US AUDIT-C     1. How often do you have a drink containing alcohol? 0 Filed at: 10/20/2024 1654   2. How many drinks containing alcohol do you have on a typical day you are drinking?  0 Filed at: 10/20/2024 1654   3b. FEMALE Any Age, or MALE 65+: How often do you have 4 or more drinks on one occassion? 0 Filed at: 10/20/2024 1654   Audit-C Score 0 Filed at: 10/20/2024 1654   SANDRA: How many times in the past year have you...    Used an illegal drug or used a prescription medication for non-medical reasons? Never Filed at: 10/20/2024 1654                            History of Present Illness       Chief Complaint   Patient presents with    Possible UTI     Patient reports burning with urination x1 week and states she feels like there is a \"lump\" protruding from her vagina. "       Past Medical History:   Diagnosis Date    Arthritis     Chronic hepatitis C (HCC)     Diverticulosis of colon     Hx of bleeding disorder     rectal bleeding    Hypertension       Past Surgical History:   Procedure Laterality Date    COLONOSCOPY N/A 3/28/2017    Procedure: COLONOSCOPY;  Surgeon: Seth Smallwood MD;  Location: [facility];  Service:     COLONOSCOPY N/A 3/26/2017    Procedure: COLONOSCOPY with hemostasis clip placement;  Surgeon: Tom Chong MD;  Location: [facility];  Service:     COLONOSCOPY N/A 1/27/2017    Procedure: COLONOSCOPY;  Surgeon: Mejia Hannah MD;  Location: [facility];  Service:     COLONOSCOPY N/A 4/7/2017    Procedure: COLONOSCOPY;  Surgeon: Tom Chong MD;  Location: [facility];  Service:     ESOPHAGOGASTRODUODENOSCOPY N/A 3/27/2017    Procedure: ESOPHAGOGASTRODUODENOSCOPY (EGD) with APC;  Surgeon: Seth Smallwood MD;  Location: [facility];  Service:     ME SIGMOIDOSCOPY FLX DX W/COLLJ SPEC BR/WA IF PFRMD N/A 3/27/2017    Procedure: SIGMOIDOSCOPY FLEXIBLE;  Surgeon: Seth Smallwood MD;  Location: [facility];  Service: Gastroenterology    TUBAL LIGATION        Family History   Problem Relation Age of Onset    Breast cancer Mother 60    No Known Problems Father     No Known Problems Sister     No Known Problems Sister     No Known Problems Sister     Breast cancer Daughter 49    No Known Problems Maternal Grandmother     No Known Problems Maternal Grandfather     No Known Problems Paternal Grandmother     No Known Problems Paternal Grandfather     No Known Problems Paternal Aunt     Colon cancer Neg Hx     Ovarian cancer Neg Hx     Uterine cancer Neg Hx       Social History     Tobacco Use    Smoking status: Never     Passive exposure: Never    Smokeless tobacco: Never   Vaping Use    Vaping status: Never Used   Substance Use Topics    Alcohol use: No    Drug use: No      E-Cigarette/Vaping    E-Cigarette Use Never User       E-Cigarette/Vaping Substances    Nicotine No     THC No     CBD No      "Flavoring No     Other No     Unknown No       I have reviewed and agree with the history as documented.     Patient presents with:  Possible UTI: Patient reports burning with urination x1 week and states she feels like there is a \"lump\" protruding from her vagina.    74y F here for evaluation of dysuria and a \"lump\" in the vaginal region.  Notes dysuria, no gross hematuria, no frequency, no urgency. Denies suprapubic pain/pressure. No f/c/s, no n/v, no flank pain.  Feels a \"lump\" near / in the vaginal introitus that is new.  No vag bleeding or discharge. No hx of vaginal/uterine prolapse, no hx of cystocele/rectocele          History provided by:  Patient   used: No        Review of Systems   All other systems reviewed and are negative.          Objective       ED Triage Vitals [10/20/24 1652]   Temperature Pulse Blood Pressure Respirations SpO2 Patient Position - Orthostatic VS   98.2 °F (36.8 °C) 89 151/75 20 99 % Sitting      Temp Source Heart Rate Source BP Location FiO2 (%) Pain Score    Oral Monitor Right arm -- No Pain      Vitals      Date and Time Temp Pulse SpO2 Resp BP Pain Score FACES Pain Rating User   10/20/24 1652 98.2 °F (36.8 °C) 89 99 % 20 151/75 No Pain -- AMB            Physical Exam  Vitals and nursing note reviewed. Exam conducted with a chaperone present.   Constitutional:       General: She is not in acute distress.     Appearance: Normal appearance. She is not ill-appearing, toxic-appearing or diaphoretic.   HENT:      Nose: Nose normal.      Mouth/Throat:      Mouth: Mucous membranes are moist.   Eyes:      Conjunctiva/sclera: Conjunctivae normal.   Cardiovascular:      Rate and Rhythm: Normal rate.   Pulmonary:      Effort: Pulmonary effort is normal.   Abdominal:      Palpations: Abdomen is soft.      Tenderness: There is no abdominal tenderness. There is no right CVA tenderness or left CVA tenderness.   Genitourinary:     Labia:         Right: No lesion.         " Left: No lesion.       Urethra: Prolapse present.   Musculoskeletal:      Cervical back: Normal range of motion.   Skin:     General: Skin is warm.   Neurological:      General: No focal deficit present.      Mental Status: She is alert.   Psychiatric:         Mood and Affect: Mood normal.         Results Reviewed       Procedure Component Value Units Date/Time    Urine Microscopic [[id number]]  (Abnormal) Collected: 10/20/24 1701    Lab Status: Final result Specimen: Urine, Clean Catch Updated: 10/20/24 1725     RBC, UA 30-50 /hpf      WBC, UA 2-4 /hpf      Epithelial Cells Occasional /hpf      Bacteria, UA Occasional /hpf      MUCUS THREADS Occasional     Hyaline Casts, UA 10-25 /lpf     Urine Macroscopic, POC [[id number]]  (Abnormal) Collected: 10/20/24 1701    Lab Status: Final result Specimen: Urine Updated: 10/20/24 1703     Color, UA Yellow     Clarity, UA Clear     pH, UA 6.0     Leukocytes, UA Trace     Nitrite, UA Negative     Protein, UA Negative mg/dl      Glucose, UA Negative mg/dl      Ketones, UA Negative mg/dl      Urobilinogen, UA 0.2 E.U./dl      Bilirubin, UA Negative     Occult Blood, UA Large     Specific Gravity, UA 1.020    Narrative:      CLINITEK RESULT            No orders to display       Procedures    ED Medication and Procedure Management   Prior to Admission Medications   Prescriptions Last Dose Informant Patient Reported? Taking?   Diclofenac Sodium (VOLTAREN) 1 %   No No   Sig: Apply 2 g topically 4 (four) times a day   atorvastatin (LIPITOR) 20 mg tablet   No No   Sig: Take 1 tablet (20 mg total) by mouth daily   carbamide peroxide (DEBROX) 6.5 % otic solution   No No   Sig: Administer 5 drops to the right ear 2 (two) times a day   cholecalciferol (VITAMIN D3) 1,000 units tablet   No No   Sig: Take 1 tablet (1,000 Units total) by mouth daily   enalapril (VASOTEC) 10 mg tablet   No No   Sig: Take 1 tablet (10 mg total) by mouth daily   ferrous gluconate (FERGON) 324 mg tablet   No No    Sig: Take 1 tablet by mouth 2 (two) times a day before meals for 30 days   hydroCHLOROthiazide 25 mg tablet   No No   Sig: Take 1 tablet (25 mg total) by mouth daily   mirtazapine (REMERON) 7.5 MG tablet   No No   Sig: TAKE 1 TABLET (7.5 MG TOTAL) BY MOUTH DAILY AT BEDTIME   potassium chloride (Klor-Con M20) 20 mEq tablet   No No   Sig: Take 1 tablet (20 mEq total) by mouth daily   tiZANidine (ZANAFLEX) 2 mg tablet   No No   Sig: Take 1 tablet (2 mg total) by mouth every 8 (eight) hours as needed for muscle spasms      Facility-Administered Medications: None     Discharge Medication List as of 10/20/2024  6:14 PM        CONTINUE these medications which have NOT CHANGED    Details   atorvastatin (LIPITOR) 20 mg tablet Take 1 tablet (20 mg total) by mouth daily, Starting Wed 5/29/2024, Normal      carbamide peroxide (DEBROX) 6.5 % otic solution Administer 5 drops to the right ear 2 (two) times a day, Starting Thu 2/29/2024, Normal      cholecalciferol (VITAMIN D3) 1,000 units tablet Take 1 tablet (1,000 Units total) by mouth daily, Starting Wed 5/29/2024, Normal      Diclofenac Sodium (VOLTAREN) 1 % Apply 2 g topically 4 (four) times a day, Starting Wed 5/29/2024, Normal      enalapril (VASOTEC) 10 mg tablet Take 1 tablet (10 mg total) by mouth daily, Starting Wed 5/29/2024, Normal      ferrous gluconate (FERGON) 324 mg tablet Take 1 tablet by mouth 2 (two) times a day before meals for 30 days, Starting Thu 3/30/2017, Until Thu 6/1/2023, Print      hydroCHLOROthiazide 25 mg tablet Take 1 tablet (25 mg total) by mouth daily, Starting Wed 5/29/2024, Until Mon 11/25/2024, Normal      mirtazapine (REMERON) 7.5 MG tablet TAKE 1 TABLET (7.5 MG TOTAL) BY MOUTH DAILY AT BEDTIME, Starting Fri 6/21/2024, Normal      potassium chloride (Klor-Con M20) 20 mEq tablet Take 1 tablet (20 mEq total) by mouth daily, Starting Tue 12/5/2023, Normal      tiZANidine (ZANAFLEX) 2 mg tablet Take 1 tablet (2 mg total) by mouth every 8  (eight) hours as needed for muscle spasms, Starting Mon 12/5/2022, Normal           No discharge procedures on file.  ED SEPSIS DOCUMENTATION   Time reflects when diagnosis was documented in both MDM as applicable and the Disposition within this note       Time User Action Codes Description Comment    10/20/2024  6:09 PM Elizabeth Wheat Add [N36.8] Urethral prolapse     10/20/2024  6:13 PM Elizabeth Wheat Add [R31.29] Microscopic hematuria                  Elizabeth Wheat,   10/23/24 1310

## 2024-11-04 ENCOUNTER — ANNUAL EXAM (OUTPATIENT)
Dept: OBGYN CLINIC | Facility: CLINIC | Age: 74
End: 2024-11-04
Payer: MEDICARE

## 2024-11-04 VITALS
HEIGHT: 67 IN | BODY MASS INDEX: 23.79 KG/M2 | SYSTOLIC BLOOD PRESSURE: 122 MMHG | WEIGHT: 151.6 LBS | DIASTOLIC BLOOD PRESSURE: 72 MMHG

## 2024-11-04 DIAGNOSIS — Z12.11 COLON CANCER SCREENING: ICD-10-CM

## 2024-11-04 DIAGNOSIS — Z12.31 ENCOUNTER FOR SCREENING MAMMOGRAM FOR BREAST CANCER: ICD-10-CM

## 2024-11-04 DIAGNOSIS — Z01.419 ENCOUNTER FOR GYNECOLOGICAL EXAMINATION WITHOUT ABNORMAL FINDING: Primary | ICD-10-CM

## 2024-11-04 DIAGNOSIS — R31.9 HEMATURIA, UNSPECIFIED TYPE: ICD-10-CM

## 2024-11-04 DIAGNOSIS — M85.80 OSTEOPENIA AFTER MENOPAUSE: ICD-10-CM

## 2024-11-04 DIAGNOSIS — Z78.0 OSTEOPENIA AFTER MENOPAUSE: ICD-10-CM

## 2024-11-04 PROCEDURE — G0101 CA SCREEN;PELVIC/BREAST EXAM: HCPCS | Performed by: NURSE PRACTITIONER

## 2024-11-04 NOTE — PROGRESS NOTES
Assessment / Plan    Assessment & Plan  Encounter for gynecological examination without abnormal finding  Well woman exam.  Cervical cancer screening is no longer indicated.  Over due for colon cancer screening-- accepts order for cologuard.       Encounter for screening mammogram for breast cancer    Orders:    Mammo screening bilateral w 3d and cad; Future    Osteopenia after menopause    Orders:    DXA bone density spine hip and pelvis; Future    Colon cancer screening    Orders:    Cologuard    Hematuria, unspecified type  Finding in ED on 10/2024.  Repeat UA w/ C&S  If hematuria present without an infection will refer to Urology.    Orders:    Urinalysis with microscopic; Future    Urine culture; Future    Yearly/ e&m (post ED/hematuria)      Lorenzo Gee is a 74 y.o. female who presents for her annual gynecologic exam.    73 yo  PMF presents for her GYN exam.    Seen in ED 10/20/24, thought she had a UTI and felt a lump in the vaginal area. UA with micro showed RBCs.  No culture was sent.    Last pap- 6y ago in [place]  Pap hx: NL  STD hx: none; chronic hep C (unknown source)  Sexually active: no  Condom use: --  Last mammogram- 2024, normal  Colonoscopy: 2017  DEXA scan: 2022, osteopenia  Nutrition: Body mass index is 23.74 kg/m².  Calcium intake: given guidelines  Exercise: walking, 1x; given guidelines  Safety: feels safe    History of abnormal Pap smear: no  Family history of breast,uterine, ovarian or colon cancer: yes - mother and daughter with breast ca    The following portions of the patient's history were reviewed and updated as appropriate: allergies, current medications, past family history, past medical history, past social history, past surgical history, and problem list.    Review of Systems      Review of Systems   Constitutional:  Negative for chills and fever.   Respiratory:  Negative for cough and shortness of breath.    Gastrointestinal:  Negative for abdominal  "distention, abdominal pain, blood in stool, constipation, diarrhea, nausea and vomiting.   Genitourinary:  Negative for difficulty urinating, dysuria, frequency, genital sores, hematuria, menstrual problem, pelvic pain, urgency, vaginal bleeding and vaginal discharge.   Musculoskeletal:  Negative for arthralgias and myalgias.     Breasts:  Negative for skin changes, dimpling, asymmetry, nipple discharge, redness, tenderness or palpable masses    Objective     *patient agrees to exam without a chaperone present.     /72 (BP Location: Left arm, Patient Position: Sitting, Cuff Size: Standard)   Ht 5' 7\" (1.702 m)   Wt 68.8 kg (151 lb 9.6 oz)   BMI 23.74 kg/m²   Physical Exam  Constitutional:       General: She is not in acute distress.     Appearance: Normal appearance. She is well-developed and normal weight. She is not ill-appearing or diaphoretic.   HENT:      Head: Normocephalic and atraumatic.   Eyes:      Pupils: Pupils are equal, round, and reactive to light.   Neck:      Thyroid: No thyromegaly.   Pulmonary:      Effort: Pulmonary effort is normal.   Chest:   Breasts:     Breasts are symmetrical.      Right: No inverted nipple, mass, nipple discharge, skin change or tenderness.      Left: No inverted nipple, mass, nipple discharge, skin change or tenderness.   Abdominal:      General: There is no distension.      Palpations: Abdomen is soft. There is no mass.      Tenderness: There is no abdominal tenderness. There is no guarding or rebound.   Genitourinary:     General: Normal vulva.      Exam position: Lithotomy position.      Labia:         Right: No rash, tenderness, lesion or injury.         Left: No rash, tenderness, lesion or injury.       Vagina: No signs of injury and foreign body. No vaginal discharge, erythema, tenderness or bleeding.      Cervix: No cervical motion tenderness, discharge or friability.      Uterus: Not enlarged and not tender.       Adnexa:         Right: No mass or " tenderness.          Left: No mass or tenderness.     Musculoskeletal:      Cervical back: Neck supple.   Lymphadenopathy:      Cervical: No cervical adenopathy.      Upper Body:      Right upper body: No supraclavicular adenopathy.      Left upper body: No supraclavicular adenopathy.   Skin:     General: Skin is warm and dry.   Neurological:      General: No focal deficit present.      Mental Status: She is alert and oriented to person, place, and time.   Psychiatric:         Mood and Affect: Mood normal.         Behavior: Behavior normal.         Thought Content: Thought content normal.         Judgment: Judgment normal.

## 2024-11-04 NOTE — PATIENT INSTRUCTIONS
Patient Education     Calcium Rich Diet   About this topic   Calcium is one of the most important minerals and is found in almost all parts of your body. It makes your teeth and bones strong and healthy. Your body does not make calcium. It is important for you to eat calcium rich foods to get enough calcium. It also helps your body:  Make blood clots  Keep your heartbeat normal  Helps blood move throughout the body  Release hormones  Release enzymes  Send and get signals between your nerves and brain  Make muscles work well         What will the results be?   It is important to have a good amount of calcium in your food. Calcium helps your body work well. It is very important during every life stage. Calcium may also keep you from losing bone mass. This is osteopenia. It may help keep you from having weak bones. This is osteoporosis.  What drugs may be needed?   The doctor may order calcium and vitamin D supplements. You need vitamin D to help your body take in the calcium. Your calcium supplement may have vitamin D in it.  Talk to your doctor about:  If it is OK to take your calcium with food.  How often to take your calcium supplement throughout the day.  All the drugs you are taking. Be sure to include all prescription and over-the-counter (OTC) drugs, and herbal supplements. Tell the doctor about any drug allergy. Bring a list of drugs you take with you. Some drugs may cause problems with how your body takes in calcium.  Will physical activity be limited?   No, physical activities will not be limited. It is good for you to do light exercises and to stay active.  What changes to diet are needed?   You will need to watch how much calcium is in the foods you eat. Your doctor will talk to you about the right amount of calcium for you. How much calcium you need is based on your age and life stage.  When is this diet used?   This diet is used when your normal diet is low in calcium. It is needed to raise the amount of  calcium in your diet. Our bodies do not take in calcium well as we get older.  Who should not use this diet?   People with too much calcium in their blood should not use this diet. This is called hypercalcemia.  What foods are good to eat?   Milk, yogurt, and cheese products are naturally high in calcium.  These foods have smaller amounts of calcium. If they are eaten often and in large amounts they can be good sources of calcium:  Oysters; dried fruit like figs and raisins; green leafy vegetables like broccoli, collards, kale, mustard greens, bok barbara; soy beans; oranges  [place] and sardines. Be sure to eat the soft bones.  Nuts like almonds and Brazil nuts, sunflower seeds, tahini, dried beans  Food products with calcium added to them like orange juice, tofu, cereal, bread; almond milk, rice milk, soy milk  What foods should be limited or avoided?   People who eat many kinds of foods do not have to be worried about limiting or avoiding certain foods.   What problems could happen?   Some people may get kidney stones. This may happen after taking high amounts of calcium over a long time. Calcium from food does not seem to cause kidney stones.  Hard stools.  Too much calcium may interfere with your body’s ability to absorb iron and zinc.  When do I need to call the doctor?   Call your doctor if you have concerns about your diet. Tell your doctor if you are allergic to any of the foods in your diet.  Helpful tips   If you have problems digesting milk, try lactose-free milk. You may also use a product to help you take in lactose.  Talk with your doctor if you are a vegetarian and do not eat dairy products. Your doctor can help you make sure you get the amount of calcium your body needs.  Last Reviewed Date   2021-03-12  Consumer Information Use and Disclaimer   This generalized information is a limited summary of diagnosis, treatment, and/or medication information. It is not meant to be comprehensive and should be used  as a tool to help the user understand and/or assess potential diagnostic and treatment options. It does NOT include all information about conditions, treatments, medications, side effects, or risks that may apply to a specific patient. It is not intended to be medical advice or a substitute for the medical advice, diagnosis, or treatment of a health care provider based on the health care provider's examination and assessment of a patient’s specific and unique circumstances. Patients must speak with a health care provider for complete information about their health, medical questions, and treatment options, including any risks or benefits regarding use of medications. This information does not endorse any treatments or medications as safe, effective, or approved for treating a specific patient. UpToDate, Inc. and its affiliates disclaim any warranty or liability relating to this information or the use thereof. The use of this information is governed by the Terms of Use, available at https://www.Adomos.ImpactFlo/en/know/clinical-effectiveness-terms   Copyright   Copyright © 2024 UpToDate, Inc. and its affiliates and/or licensors. All rights reserved.  Patient Education     Exercise and movement   The Basics   Written by the doctors and editors at [employer]   What are the benefits of movement? -- Moving your body has many benefits. It can:   Burn calories, which helps people manage their weight   Help control blood sugar levels in people with diabetes   Lower blood pressure, especially in people with high blood pressure   Lower stress, and help with depression and anxiety   Keep bones strong, so they don't get thin and break easily   Lower the chance of dying from heart disease  Adding even small amounts of physical activity to your daily routine can improve your health.  What are the main types of exercise? -- There are 3 main types of exercise:   Aerobic exercise - This raises your heart rate. Examples include  walking, running, dancing, riding a bike, and swimming.   Muscle strengthening - This helps make your muscles stronger. You can do it using weights, exercise bands, or weight machines. You can also use your own body weight, as with push-ups, or by lifting items in your home, like jugs of water.   Stretching - These help your muscles and joints move more easily.  It's important to have all 3 types in your exercise program. That way, your body, muscles, and joints can be as healthy as possible.  Should I talk to my doctor or nurse before I start exercising? -- If you have not exercised before or have not exercised in a long time, talk with your doctor or nurse before you start a very active exercise program.  If you have heart disease or risk factors for heart disease (like high blood pressure or diabetes), your doctor or nurse might recommend that you have an exercise test before starting an exercise program.  When you begin an exercise program, start slowly. For example, do the exercise at a slow pace or for a few minutes only. Over time, you can exercise faster and for longer periods of time.  What should I do when I exercise? -- Each time you exercise, you should:   Warm up - This can help keep you from hurting your muscles when you exercise. To warm up, do a light aerobic exercise (such as walking slowly) or stretch for 5 to 10 minutes.   Work out - Try to get a mix of aerobic exercise, muscle strengthening, and stretching. During an aerobic workout, you can walk fast, swim, run, or use an exercise machine, for example. Other activities, like dancing or playing tennis, are also forms of aerobic exercise. You should also take time to stretch all of your joints, including your neck, shoulders, back, hips, and knees. At least 2 times a week, you can do muscle strengthening exercises as part of your workout.   Cool down - This helps keep you from feeling dizzy after you exercise and helps prevent muscle cramps. To  cool down, you can stretch or do a light aerobic exercise for 5 minutes.  Some people go to a gym or do group exercise classes. But you can exercise even without these things. Some exercises can be done even in a small space. You can also try online videos or smartphone apps to get ideas for different types of exercise.  How often should I exercise? -- Doctors recommend that people exercise at least 30 minutes a day, on 5 or more days of the week.  If you can't exercise for 30 minutes straight, try to exercise for 10 minutes at a time, 3 or 4 times a day. Even exercising for shorter amounts of time is good for you, especially if it means spending less time sitting.  When should I call my doctor or nurse? -- If you have any of the following symptoms when you exercise, stop exercising and call your doctor or nurse right away:   Pain or pressure in your chest, arms, throat, jaw, or back   Nausea or vomiting   Feeling like your heart is fluttering or racing very fast   Feeling dizzy or faint  What if I don't have time to exercise? -- Many people have very busy lives and might not think that they have time to exercise. But it's important to try to find time, even if you are tired or work a lot. Exercise can increase your energy level, which can make you feel better and might even help you get more work done.  Even if it's hard to set aside a lot of time to exercise, you can still improve your health by moving your body more. There are many ways that you can be more active. For example, you can:   Take the stairs instead of the elevator.   Park in a parking space that is farther away from the door.   Take a longer route when you walk from one place to another.  Spending a lot of time sitting still (for example, watching TV or working on the computer) is bad for your health. Try to get up and move around whenever you can. Even small amounts of movement, like taking short walks, doing household chores, or gardening, can  improve your health. Finding activities that you enjoy, or doing them with other people, can help you add more movement into your daily life.  What else should I do when I exercise? -- To exercise safely and avoid problems, it's important to:   Drink fluids during and after exercising (but avoid drinks with a lot of caffeine or sugar).   Avoid exercising outside if it is too hot or cold.   Wear layers of clothes, so that you can take them off if you get too hot.   Wear shoes that fit well and support your feet.   Be aware of your surroundings if you exercise outside.  All topics are updated as new evidence becomes available and our peer review process is complete.  This topic retrieved from [employer] on: May 18, 2024.  Topic 83500 Version 31.0  Release: 32.4.3 - C32.137  © 2024 UpToDate, Inc. and/or its affiliates. All rights reserved.  Consumer Information Use and Disclaimer   Disclaimer: This generalized information is a limited summary of diagnosis, treatment, and/or medication information. It is not meant to be comprehensive and should be used as a tool to help the user understand and/or assess potential diagnostic and treatment options. It does NOT include all information about conditions, treatments, medications, side effects, or risks that may apply to a specific patient. It is not intended to be medical advice or a substitute for the medical advice, diagnosis, or treatment of a health care provider based on the health care provider's examination and assessment of a patient's specific and unique circumstances. Patients must speak with a health care provider for complete information about their health, medical questions, and treatment options, including any risks or benefits regarding use of medications. This information does not endorse any treatments or medications as safe, effective, or approved for treating a specific patient. UpToDate, Inc. and its affiliates disclaim any warranty or liability relating to  this information or the use thereof.The use of this information is governed by the Terms of Use, available at https://www.wolterskluwer.com/en/know/clinical-effectiveness-terms. 2024© UpToDate, Inc. and its affiliates and/or licensors. All rights reserved.  Copyright   © 2024 [employer] and/or its affiliates. All rights reserved.

## 2024-11-15 ENCOUNTER — APPOINTMENT (OUTPATIENT)
Dept: LAB | Facility: CLINIC | Age: 74
End: 2024-11-15
Payer: MEDICARE

## 2024-11-15 DIAGNOSIS — R31.9 HEMATURIA, UNSPECIFIED TYPE: ICD-10-CM

## 2024-11-15 LAB
BACTERIA UR QL AUTO: ABNORMAL /HPF
BILIRUB UR QL STRIP: NEGATIVE
CLARITY UR: CLEAR
COLOR UR: YELLOW
GLUCOSE UR STRIP-MCNC: NEGATIVE MG/DL
HGB UR QL STRIP.AUTO: ABNORMAL
KETONES UR STRIP-MCNC: NEGATIVE MG/DL
LEUKOCYTE ESTERASE UR QL STRIP: ABNORMAL
NITRITE UR QL STRIP: NEGATIVE
NON-SQ EPI CELLS URNS QL MICRO: ABNORMAL /HPF
PH UR STRIP.AUTO: 7 [PH]
PROT UR STRIP-MCNC: ABNORMAL MG/DL
RBC #/AREA URNS AUTO: ABNORMAL /HPF
SP GR UR STRIP.AUTO: 1.02 (ref 1–1.03)
UROBILINOGEN UR STRIP-ACNC: <2 MG/DL
WBC #/AREA URNS AUTO: ABNORMAL /HPF

## 2024-11-15 PROCEDURE — 81001 URINALYSIS AUTO W/SCOPE: CPT

## 2024-11-15 PROCEDURE — 87086 URINE CULTURE/COLONY COUNT: CPT

## 2024-11-17 LAB — BACTERIA UR CULT: NORMAL

## 2024-11-18 ENCOUNTER — RESULTS FOLLOW-UP (OUTPATIENT)
Dept: OBGYN CLINIC | Facility: CLINIC | Age: 74
End: 2024-11-18

## 2024-11-18 DIAGNOSIS — R31.9 HEMATURIA, UNSPECIFIED TYPE: Primary | ICD-10-CM

## 2024-11-18 NOTE — RESULT ENCOUNTER NOTE
Patient's repeat UA with micro shows blood and urine culture is negative for infection.  Advise referral to Urology.  Referral placed.

## 2024-11-19 LAB — COLOGUARD RESULT REPORTABLE: NEGATIVE

## 2024-11-25 NOTE — RESULT ENCOUNTER NOTE
Unread message sent to patient by me via [employer].  Please contact patient and inform her of my recommendation.

## 2025-01-07 NOTE — PROGRESS NOTES
1/8/2025      Chief Complaint   Patient presents with   • Microhematuria     asymptomatic         Assessment and Plan    74 y.o. female managed by New patient       1. Microscopic hematuria  Assessment & Plan:  Saulo hematuria   UA 11/15 and 10/20  RBC- 30-50  Urine culture negative   Smoking hisotry- no   Chemical exposure- no   Family history- negative urological malignancy  Per [facility] patient falls in high risk group  Plan to follow-up with CT urogram and cystoscopy for further evaluation  Orders:  -     CT renal protocol; Future; Expected date: 01/08/2025    History of Present Illness  Cary Gee is a 74 y.o. female here for evaluation of endoscopic hematuria workup.  Patient did present to the emergency room in October for a lump in her vaginal region.  UA was collected at that time which did rule out infection.  Patient was sent to follow-up with gynecology.  Patient was then seen by gynecology in November her exam was negative for urethra prolapse- nothing was noted on OB/GYN exam. Patient reports that provider states that everything looked normal.  Repeat urine was collected then which continue to show microscopic hematuria 30-50 RBCs.  Urine culture was negative for infection.  Patient denies UTI symptoms at this time.  No dysuria, gross hematuria, flank pain, urgency, frequency.  She is currently on no  medication. She does not currently feel like she a lump in that area. She reports no history of urinary tract infections or nephrolithiasis.  She denies a history of microscopic hematuria or gross hematuria in the past.  She denies family history of urologic malignancy or history of smoking/chemical exposure.        Review of Systems   Constitutional:  Negative for chills and fever.   HENT:  Negative for ear pain and sore throat.    Eyes:  Negative for pain and visual disturbance.   Respiratory:  Negative for cough and shortness of breath.    Cardiovascular:  Negative for chest pain and palpitations.  "  Gastrointestinal:  Negative for abdominal pain and vomiting.   Genitourinary:  Negative for decreased urine volume, difficulty urinating, dysuria, flank pain, frequency, hematuria, pelvic pain, urgency and vaginal pain.   Musculoskeletal:  Negative for arthralgias and back pain.   Skin:  Negative for color change and rash.   Neurological:  Negative for seizures and syncope.   All other systems reviewed and are negative.      Vitals  Vitals:    01/08/25 1336   BP: 130/84   BP Location: Left arm   Patient Position: Sitting   Cuff Size: Standard   Pulse: 96   SpO2: 98%   Weight: 68.1 kg (150 lb 3.2 oz)   Height: 5' 7\" (1.702 m)       Physical Exam  Constitutional:       Appearance: Normal appearance.   HENT:      Head: Normocephalic and atraumatic.   Pulmonary:      Effort: Pulmonary effort is normal.   Musculoskeletal:         General: Normal range of motion.      Cervical back: Normal range of motion.   Neurological:      General: No focal deficit present.      Mental Status: She is alert and oriented to person, place, and time. Mental status is at baseline.   Psychiatric:         Mood and Affect: Mood normal.         Behavior: Behavior normal.         Thought Content: Thought content normal.           Past History  Past Medical History:   Diagnosis Date   • Arthritis    • Chronic hepatitis C (HCC)    • Diverticulosis of colon    • Hx of bleeding disorder     rectal bleeding   • Hypertension      Social History     Socioeconomic History   • Marital status:      Spouse name: None   • Number of children: None   • Years of education: None   • Highest education level: None   Occupational History   • None   Tobacco Use   • Smoking status: Never     Passive exposure: Never   • Smokeless tobacco: Never   Vaping Use   • Vaping status: Never Used   Substance and Sexual Activity   • Alcohol use: No   • Drug use: No   • Sexual activity: Not Currently     Partners: Male     Birth control/protection: None     Comment: " last sexually active 2012   Other Topics Concern   • None   Social History Narrative    Sikhism    Patient unsure of advance healthcare directive status     Social Drivers of Health     Financial Resource Strain: Low Risk  (5/11/2023)    Overall Financial Resource Strain (CARDIA)    • Difficulty of Paying Living Expenses: Not hard at all   Food Insecurity: No Food Insecurity (5/11/2023)    Hunger Vital Sign    • Worried About Running Out of Food in the Last Year: Never true    • Ran Out of Food in the Last Year: Never true   Transportation Needs: No Transportation Needs (5/11/2023)    PRAPARE - Transportation    • Lack of Transportation (Medical): No    • Lack of Transportation (Non-Medical): No   Physical Activity: Not on file   Stress: Not on file   Social Connections: Not on file   Intimate Partner Violence: Not on file   Housing Stability: Low Risk  (5/29/2024)    Housing Stability Vital Sign    • Unable to Pay for Housing in the Last Year: No    • Number of Times Moved in the Last Year: 1    • Homeless in the Last Year: No     Social History     Tobacco Use   Smoking Status Never   • Passive exposure: Never   Smokeless Tobacco Never     Family History   Problem Relation Age of Onset   • Breast cancer Mother 60   • No Known Problems Father    • No Known Problems Sister    • No Known Problems Sister    • No Known Problems Sister    • Breast cancer Daughter 49   • No Known Problems Maternal Grandmother    • No Known Problems Maternal Grandfather    • No Known Problems Paternal Grandmother    • No Known Problems Paternal Grandfather    • No Known Problems Paternal Aunt    • Colon cancer Neg Hx    • Ovarian cancer Neg Hx    • Uterine cancer Neg Hx        The following portions of the patient's history were reviewed and updated as appropriate: allergies, current medications, past medical history, past social history, past surgical history and problem list.    Results  No results found for this or any  "previous visit (from the past hour).]  No results found for: \"PSA\"  Lab Results   Component Value Date    CALCIUM 10.0 02/28/2024    K 3.5 02/28/2024    CO2 33 (H) 02/28/2024    CL 97 02/28/2024    BUN 18 02/28/2024    CREATININE 1.10 02/28/2024     Lab Results   Component Value Date    WBC 11.99 (H) 02/28/2024    HGB 13.7 02/28/2024    HCT 44.0 02/28/2024     (H) 02/28/2024     02/28/2024       "

## 2025-01-08 ENCOUNTER — OFFICE VISIT (OUTPATIENT)
Dept: UROLOGY | Facility: CLINIC | Age: 75
End: 2025-01-08

## 2025-01-08 ENCOUNTER — TELEPHONE (OUTPATIENT)
Dept: UROLOGY | Facility: CLINIC | Age: 75
End: 2025-01-08

## 2025-01-08 VITALS
SYSTOLIC BLOOD PRESSURE: 130 MMHG | OXYGEN SATURATION: 98 % | BODY MASS INDEX: 23.57 KG/M2 | HEART RATE: 96 BPM | DIASTOLIC BLOOD PRESSURE: 84 MMHG | WEIGHT: 150.2 LBS | HEIGHT: 67 IN

## 2025-01-08 DIAGNOSIS — R31.29 MICROSCOPIC HEMATURIA: Primary | ICD-10-CM

## 2025-01-08 NOTE — ASSESSMENT & PLAN NOTE
Saulo hematuria   UA 11/15 and 10/20  RBC- 30-50  Urine culture negative   Smoking hisotry- no   Chemical exposure- no   Family history- negative urological malignancy  Per [facility] patient falls in high risk group  Plan to follow-up with CT urogram and cystoscopy for further evaluation

## 2025-01-15 ENCOUNTER — TELEPHONE (OUTPATIENT)
Dept: UROLOGY | Facility: CLINIC | Age: 75
End: 2025-01-15

## 2025-01-15 NOTE — TELEPHONE ENCOUNTER
Called patient and informed her that her appointment on 3/7/25 needs to be moved to 3pm. Patient confirmed understanding and new apt time.

## 2025-01-16 ENCOUNTER — APPOINTMENT (OUTPATIENT)
Dept: LAB | Facility: CLINIC | Age: 75
End: 2025-01-16
Payer: MEDICARE

## 2025-01-16 DIAGNOSIS — R31.29 MICROSCOPIC HEMATURIA: ICD-10-CM

## 2025-01-16 PROCEDURE — 36415 COLL VENOUS BLD VENIPUNCTURE: CPT

## 2025-01-16 PROCEDURE — 80053 COMPREHEN METABOLIC PANEL: CPT

## 2025-01-17 ENCOUNTER — APPOINTMENT (OUTPATIENT)
Dept: LAB | Facility: CLINIC | Age: 75
End: 2025-01-17
Payer: MEDICARE

## 2025-01-17 DIAGNOSIS — R31.29 MICROSCOPIC HEMATURIA: ICD-10-CM

## 2025-01-17 LAB
ALBUMIN SERPL BCG-MCNC: 4.3 G/DL (ref 3.5–5)
ALP SERPL-CCNC: 77 U/L (ref 34–104)
ALT SERPL W P-5'-P-CCNC: 9 U/L (ref 7–52)
ANION GAP SERPL CALCULATED.3IONS-SCNC: 13 MMOL/L (ref 4–13)
AST SERPL W P-5'-P-CCNC: 14 U/L (ref 13–39)
BILIRUB SERPL-MCNC: 0.46 MG/DL (ref 0.2–1)
BUN SERPL-MCNC: 15 MG/DL (ref 5–25)
CALCIUM SERPL-MCNC: 8.6 MG/DL (ref 8.4–10.2)
CHLORIDE SERPL-SCNC: 100 MMOL/L (ref 96–108)
CO2 SERPL-SCNC: 28 MMOL/L (ref 21–32)
CREAT SERPL-MCNC: 1.2 MG/DL (ref 0.6–1.3)
CREAT UR-MCNC: 101.8 MG/DL
GFR SERPL CREATININE-BSD FRML MDRD: 44 ML/MIN/1.73SQ M
GLUCOSE P FAST SERPL-MCNC: 120 MG/DL (ref 65–99)
MICROALBUMIN UR-MCNC: 21.7 MG/L
MICROALBUMIN/CREAT 24H UR: 21 MG/G CREATININE (ref 0–30)
POTASSIUM SERPL-SCNC: 3.2 MMOL/L (ref 3.5–5.3)
PROT SERPL-MCNC: 7.9 G/DL (ref 6.4–8.4)
SODIUM SERPL-SCNC: 141 MMOL/L (ref 135–147)

## 2025-01-17 PROCEDURE — 82043 UR ALBUMIN QUANTITATIVE: CPT

## 2025-01-17 PROCEDURE — 82570 ASSAY OF URINE CREATININE: CPT

## 2025-01-18 ENCOUNTER — HOSPITAL ENCOUNTER (OUTPATIENT)
Dept: CT IMAGING | Facility: HOSPITAL | Age: 75
Discharge: HOME | End: 2025-01-18
Payer: MEDICARE

## 2025-01-18 DIAGNOSIS — R31.29 MICROSCOPIC HEMATURIA: ICD-10-CM

## 2025-01-18 PROCEDURE — 74178 CT ABD&PLV WO CNTR FLWD CNTR: CPT

## 2025-01-18 RX ADMIN — IOHEXOL 100 ML: 350 INJECTION, SOLUTION INTRAVENOUS at 13:10

## 2025-01-21 DIAGNOSIS — I12.9 BENIGN HYPERTENSION WITH CKD (CHRONIC KIDNEY DISEASE) STAGE III (HCC): ICD-10-CM

## 2025-01-21 DIAGNOSIS — N18.30 BENIGN HYPERTENSION WITH CKD (CHRONIC KIDNEY DISEASE) STAGE III (HCC): ICD-10-CM

## 2025-01-21 RX ORDER — ENALAPRIL MALEATE 10 MG/1
10 TABLET ORAL DAILY
Qty: 90 TABLET | Refills: 1 | Status: SHIPPED | OUTPATIENT
Start: 2025-01-21

## 2025-01-23 ENCOUNTER — TELEPHONE (OUTPATIENT)
Age: 75
End: 2025-01-23

## 2025-01-23 NOTE — TELEPHONE ENCOUNTER
Pt calling in stating that she received a bill and that for the billing department, pt stating from visit from 11/4/24, pt was ordered a UA and C&S and pt is requesting to have a change in the diagnostic code due to blood in the urine for insurance to cover the testing.     Pt requesting to cancel yearly scheduled on 11/10/25 due to medicare not paying for yearly visit, pt was notified the appt has been cancelled.

## 2025-01-28 DIAGNOSIS — E78.5 HYPERLIPIDEMIA, UNSPECIFIED HYPERLIPIDEMIA TYPE: ICD-10-CM

## 2025-01-28 DIAGNOSIS — M25.50 POLYARTHRALGIA: ICD-10-CM

## 2025-01-28 RX ORDER — CHOLECALCIFEROL (VITAMIN D3) 25 MCG
1000 TABLET ORAL DAILY
Qty: 90 TABLET | Refills: 3 | OUTPATIENT
Start: 2025-01-28

## 2025-01-28 RX ORDER — ATORVASTATIN CALCIUM 20 MG/1
20 TABLET, FILM COATED ORAL DAILY
Qty: 90 TABLET | Refills: 1 | Status: SHIPPED | OUTPATIENT
Start: 2025-01-28 | End: 2025-02-04 | Stop reason: SDUPTHER

## 2025-01-28 NOTE — TELEPHONE ENCOUNTER
Patient called office today requesting medication refill:  cholecalciferol (VITAMIN D3) 1,000 units tablet   atorvastatin (LIPITOR) 20 mg tablet     F/u appt scheduled for 2/4/25. Please advise. Thank you!

## 2025-01-30 DIAGNOSIS — E87.6 HYPOKALEMIA: ICD-10-CM

## 2025-01-30 RX ORDER — POTASSIUM CHLORIDE 1500 MG/1
20 TABLET, EXTENDED RELEASE ORAL DAILY
Qty: 90 TABLET | Refills: 1 | Status: SHIPPED | OUTPATIENT
Start: 2025-01-30 | End: 2025-02-04 | Stop reason: SDUPTHER

## 2025-01-30 NOTE — TELEPHONE ENCOUNTER
Returned patient's call regarding her Klorcon refill request. Will send refill as patient has upcoming appointment next week.

## 2025-02-04 ENCOUNTER — OFFICE VISIT (OUTPATIENT)
Dept: FAMILY MEDICINE CLINIC | Facility: CLINIC | Age: 75
End: 2025-02-04

## 2025-02-04 VITALS
WEIGHT: 148 LBS | HEIGHT: 67 IN | DIASTOLIC BLOOD PRESSURE: 80 MMHG | TEMPERATURE: 97.6 F | OXYGEN SATURATION: 97 % | HEART RATE: 97 BPM | SYSTOLIC BLOOD PRESSURE: 132 MMHG | BODY MASS INDEX: 23.23 KG/M2 | RESPIRATION RATE: 16 BRPM

## 2025-02-04 DIAGNOSIS — M54.42 ACUTE LEFT-SIDED LOW BACK PAIN WITH LEFT-SIDED SCIATICA: ICD-10-CM

## 2025-02-04 DIAGNOSIS — I12.9 BENIGN HYPERTENSION WITH CKD (CHRONIC KIDNEY DISEASE) STAGE III (HCC): ICD-10-CM

## 2025-02-04 DIAGNOSIS — G89.29 CHRONIC LEFT-SIDED LOW BACK PAIN WITH LEFT-SIDED SCIATICA: ICD-10-CM

## 2025-02-04 DIAGNOSIS — N18.31 STAGE 3A CHRONIC KIDNEY DISEASE (HCC): ICD-10-CM

## 2025-02-04 DIAGNOSIS — N18.30 BENIGN HYPERTENSION WITH CKD (CHRONIC KIDNEY DISEASE) STAGE III (HCC): ICD-10-CM

## 2025-02-04 DIAGNOSIS — Z02.4 DRIVER'S PERMIT PE (PHYSICAL EXAMINATION): ICD-10-CM

## 2025-02-04 DIAGNOSIS — M25.50 POLYARTHRALGIA: ICD-10-CM

## 2025-02-04 DIAGNOSIS — M54.42 CHRONIC LEFT-SIDED LOW BACK PAIN WITH LEFT-SIDED SCIATICA: ICD-10-CM

## 2025-02-04 DIAGNOSIS — E87.6 HYPOKALEMIA: Primary | ICD-10-CM

## 2025-02-04 DIAGNOSIS — G47.09 OTHER INSOMNIA: ICD-10-CM

## 2025-02-04 DIAGNOSIS — E78.5 HYPERLIPIDEMIA, UNSPECIFIED HYPERLIPIDEMIA TYPE: ICD-10-CM

## 2025-02-04 DIAGNOSIS — R73.03 PREDIABETES: ICD-10-CM

## 2025-02-04 PROCEDURE — 99214 OFFICE O/P EST MOD 30 MIN: CPT | Performed by: NURSE PRACTITIONER

## 2025-02-04 PROCEDURE — G2211 COMPLEX E/M VISIT ADD ON: HCPCS | Performed by: NURSE PRACTITIONER

## 2025-02-04 RX ORDER — MIRTAZAPINE 7.5 MG/1
7.5 TABLET, FILM COATED ORAL
Qty: 90 TABLET | Refills: 3 | Status: SHIPPED | OUTPATIENT
Start: 2025-02-04

## 2025-02-04 RX ORDER — LIDOCAINE 50 MG/G
1 PATCH TOPICAL DAILY
Qty: 30 PATCH | Refills: 5 | Status: SHIPPED | OUTPATIENT
Start: 2025-02-04

## 2025-02-04 RX ORDER — TIZANIDINE 2 MG/1
2 TABLET ORAL EVERY 8 HOURS PRN
Qty: 40 TABLET | Refills: 1 | Status: SHIPPED | OUTPATIENT
Start: 2025-02-04

## 2025-02-04 RX ORDER — POTASSIUM CHLORIDE 1500 MG/1
20 TABLET, EXTENDED RELEASE ORAL DAILY
Qty: 90 TABLET | Refills: 3 | Status: SHIPPED | OUTPATIENT
Start: 2025-02-04

## 2025-02-04 RX ORDER — SENNOSIDES 8.6 MG
650 CAPSULE ORAL EVERY 8 HOURS PRN
Qty: 30 TABLET | Refills: 0 | Status: SHIPPED | OUTPATIENT
Start: 2025-02-04

## 2025-02-04 RX ORDER — FERROUS GLUCONATE 324(38)MG
324 TABLET ORAL EVERY OTHER DAY
Qty: 15 TABLET | Refills: 3 | Status: CANCELLED | OUTPATIENT
Start: 2025-02-04 | End: 2025-03-06

## 2025-02-04 RX ORDER — CHOLECALCIFEROL (VITAMIN D3) 25 MCG
1000 TABLET ORAL DAILY
Qty: 90 TABLET | Refills: 3 | Status: SHIPPED | OUTPATIENT
Start: 2025-02-04

## 2025-02-04 RX ORDER — ENALAPRIL MALEATE 10 MG/1
10 TABLET ORAL DAILY
Qty: 90 TABLET | Refills: 3 | Status: SHIPPED | OUTPATIENT
Start: 2025-02-04

## 2025-02-04 RX ORDER — ATORVASTATIN CALCIUM 20 MG/1
20 TABLET, FILM COATED ORAL DAILY
Qty: 90 TABLET | Refills: 3 | Status: SHIPPED | OUTPATIENT
Start: 2025-02-04

## 2025-02-04 RX ORDER — HYDROCHLOROTHIAZIDE 25 MG/1
25 TABLET ORAL DAILY
Qty: 90 TABLET | Refills: 3 | Status: SHIPPED | OUTPATIENT
Start: 2025-02-04 | End: 2025-08-03

## 2025-02-04 NOTE — PROGRESS NOTES
Name: Cary Gee      : 1950      MRN: [id number]  Encounter Provider: MARISSA Mcpherson  Encounter Date: 2025   Encounter department: [facility]  :  Assessment & Plan  Hypokalemia  2/2 diuretic use   Continue K supplement   Orders:    Comprehensive metabolic panel; Future    potassium chloride (Klor-Con M20) 20 mEq tablet; Take 1 tablet (20 mEq total) by mouth daily    Hyperlipidemia, unspecified hyperlipidemia type  Lab Results   Component Value Date    CHOLESTEROL 135 2024    CHOLESTEROL 148 2023    CHOLESTEROL 120 2022     Lab Results   Component Value Date    HDL 48 (L) 2024    HDL 48 (L) 2023    HDL 44 (L) 2022     Lab Results   Component Value Date    TRIG 86 2024    TRIG 93 2023    TRIG 66 2022     Lab Results   Component Value Date    NONHDLC 87 2024    NONHDLC 100 2023    NONHDLC 76 2022     Lab Results   Component Value Date    LDLCALC 70 2024     Continue atorvastatin 20 mg daily     Orders:    CBC and differential; Future    Comprehensive metabolic panel; Future    Hemoglobin A1C; Future    Lipid panel; Future    atorvastatin (LIPITOR) 20 mg tablet; Take 1 tablet (20 mg total) by mouth daily    Benign hypertension with CKD (chronic kidney disease) stage III (HCC)  Lab Results   Component Value Date    EGFR 44 2025    EGFR 49 2024    EGFR 52 2023    CREATININE 1.20 2025    CREATININE 1.10 2024    CREATININE 1.05 2023     BP Readings from Last 3 Encounters:   25 132/80   25 130/84   24 122/72      BP within goal   Continue with current regimen: hctz 25 mg daily, enalapril 10 mg daily   Orders:    CBC and differential; Future    Comprehensive metabolic panel; Future    Hemoglobin A1C; Future    Lipid panel; Future    hydroCHLOROthiazide 25 mg tablet; Take 1 tablet (25 mg total) by mouth daily    enalapril (VASOTEC)  10 mg tablet; Take 1 tablet (10 mg total) by mouth daily    Stage 3a chronic kidney disease (HCC)  Lab Results   Component Value Date    EGFR 44 01/16/2025    EGFR 49 02/28/2024    EGFR 52 05/08/2023    CREATININE 1.20 01/16/2025    CREATININE 1.10 02/28/2024    CREATININE 1.05 05/08/2023       Orders:    Comprehensive metabolic panel; Future    Prediabetes  Lab Results   Component Value Date    HGBA1C 5.8 (H) 02/28/2024     Repeat A1c ordered    Orders:    Comprehensive metabolic panel; Future    Hemoglobin A1C; Future    Polyarthralgia    Orders:    Diclofenac Sodium (VOLTAREN) 1 %; Apply 2 g topically 4 (four) times a day    cholecalciferol (VITAMIN D3) 1,000 units tablet; Take 1 tablet (1,000 Units total) by mouth daily    Chronic left-sided low back pain with left-sided sciatica  Previous xray 2022 mild to moderate degenerative changes   Can use acetaminophen, topicals, muscle relaxer PRN   She declines PT   Consider pain mgt if no improvement   Orders:    Diclofenac Sodium (VOLTAREN) 1 %; Apply 2 g topically 4 (four) times a day    lidocaine (Lidoderm) 5 %; Apply 1 patch topically over 12 hours daily Remove & Discard patch within 12 hours or as directed by MD    XR spine lumbar minimum 4 views non injury; Future    acetaminophen (TYLENOL) 650 mg CR tablet; Take 1 tablet (650 mg total) by mouth every 8 (eight) hours as needed for mild pain    Other insomnia  Reports that this medication has resolved issues sleeping  Continue current dosing     Orders:    mirtazapine (REMERON) 7.5 MG tablet; Take 1 tablet (7.5 mg total) by mouth daily at bedtime    's permit PE (physical examination)  Denies any conditions that would disqualify her from safely operating a motor vehicle, vision screening completed and within acceptable limits, safe driving practices reviewed, [place] DMV permit form completed                 History of Present Illness   75 YO female w/ PMH htn, lower GI bleed, chronic hepatitis C, syncope,  "hyperlipidemia, CKD stage 3 here for follow up of chronic conditions.   Patient reports intermittent low back pain. The pain does not radiate. She does have improvement with use of acetaminophen. Denies numbness/ tingling, incontinence, falls, weakness, fever, saddle anesthesia.       The following portions of the patient's history were reviewed and updated as appropriate: allergies, current medications, past family history, past medical history, past social history, past surgical history and problem list.        Review of Systems   Constitutional:  Negative for activity change, appetite change, chills, fatigue, fever and unexpected weight change.   HENT:  Negative for hearing loss, nosebleeds, sinus pain, sneezing, sore throat and trouble swallowing.    Eyes:  Negative for photophobia and visual disturbance.   Respiratory:  Negative for cough, chest tightness, shortness of breath and wheezing.    Cardiovascular:  Negative for chest pain, palpitations and leg swelling.   Gastrointestinal:  Negative for abdominal pain, constipation, nausea and vomiting.   Genitourinary:  Negative for decreased urine volume, difficulty urinating, dysuria, flank pain, genital sores, hematuria and urgency.   Musculoskeletal:  Positive for arthralgias, back pain and myalgias. Negative for gait problem.   Skin:  Negative for pallor, rash and wound.   Neurological:  Negative for dizziness, seizures, syncope, weakness, numbness and headaches.   Hematological:  Negative for adenopathy. Does not bruise/bleed easily.   Psychiatric/Behavioral:  Negative for confusion, hallucinations, self-injury, sleep disturbance and suicidal ideas. The patient is not nervous/anxious.        Objective   /80 (BP Location: Right arm, Patient Position: Sitting, Cuff Size: Standard)   Pulse 97   Temp 97.6 °F (36.4 °C) (Temporal)   Resp 16   Ht 5' 7\" (1.702 m)   Wt 67.1 kg (148 lb)   SpO2 97%   BMI 23.18 kg/m²      Physical Exam  Vitals and nursing " note reviewed.   Constitutional:       General: She is not in acute distress.     Appearance: She is well-developed. She is not ill-appearing.   HENT:      Head: Normocephalic and atraumatic.      Right Ear: External ear normal.      Left Ear: External ear normal.      Mouth/Throat:      Mouth: Mucous membranes are moist.   Eyes:      General:         Right eye: No discharge.         Left eye: No discharge.      Conjunctiva/sclera: Conjunctivae normal.   Cardiovascular:      Rate and Rhythm: Normal rate and regular rhythm.   Pulmonary:      Effort: Pulmonary effort is normal. No respiratory distress.      Breath sounds: Normal breath sounds.   Musculoskeletal:         General: No swelling.      Cervical back: Neck supple.   Skin:     General: Skin is warm and dry.      Capillary Refill: Capillary refill takes less than 2 seconds.   Neurological:      Mental Status: She is alert and oriented to person, place, and time.   Psychiatric:         Mood and Affect: Mood normal.       Vision completed: 20/40 right 20/40 left 20/30 bilateral with glasses

## 2025-02-04 NOTE — ASSESSMENT & PLAN NOTE
2/2 diuretic use   Continue K supplement   Orders:    Comprehensive metabolic panel; Future    potassium chloride (Klor-Con M20) 20 mEq tablet; Take 1 tablet (20 mEq total) by mouth daily

## 2025-02-04 NOTE — ASSESSMENT & PLAN NOTE
Lab Results   Component Value Date    EGFR 44 01/16/2025    EGFR 49 02/28/2024    EGFR 52 05/08/2023    CREATININE 1.20 01/16/2025    CREATININE 1.10 02/28/2024    CREATININE 1.05 05/08/2023       Orders:    Comprehensive metabolic panel; Future

## 2025-02-04 NOTE — ASSESSMENT & PLAN NOTE
Denies any conditions that would disqualify her from safely operating a motor vehicle, vision screening completed and within acceptable limits, safe driving practices reviewed, [place] permit [place]

## 2025-02-04 NOTE — ASSESSMENT & PLAN NOTE
Reports that this medication has resolved issues sleeping  Continue current dosing     Orders:    mirtazapine (REMERON) 7.5 MG tablet; Take 1 tablet (7.5 mg total) by mouth daily at bedtime

## 2025-02-04 NOTE — ASSESSMENT & PLAN NOTE
Lab Results   Component Value Date    EGFR 44 01/16/2025    EGFR 49 02/28/2024    EGFR 52 05/08/2023    CREATININE 1.20 01/16/2025    CREATININE 1.10 02/28/2024    CREATININE 1.05 05/08/2023     BP Readings from Last 3 Encounters:   02/04/25 132/80   01/08/25 130/84   11/04/24 122/72      BP within goal   Continue with current regimen: hctz 25 mg daily, enalapril 10 mg daily   Orders:    CBC and differential; Future    Comprehensive metabolic panel; Future    Hemoglobin A1C; Future    Lipid panel; Future    hydroCHLOROthiazide 25 mg tablet; Take 1 tablet (25 mg total) by mouth daily    enalapril (VASOTEC) 10 mg tablet; Take 1 tablet (10 mg total) by mouth daily

## 2025-02-04 NOTE — ASSESSMENT & PLAN NOTE
Lab Results   Component Value Date    CHOLESTEROL 135 02/28/2024    CHOLESTEROL 148 11/01/2023    CHOLESTEROL 120 07/28/2022     Lab Results   Component Value Date    HDL 48 (L) 02/28/2024    HDL 48 (L) 11/01/2023    HDL 44 (L) 07/28/2022     Lab Results   Component Value Date    TRIG 86 02/28/2024    TRIG 93 11/01/2023    TRIG 66 07/28/2022     Lab Results   Component Value Date    NONHDLC 87 02/28/2024    NONHDLC 100 11/01/2023    NONHDLC 76 07/28/2022     Lab Results   Component Value Date    LDLCALC 70 02/28/2024     Continue atorvastatin 20 mg daily     Orders:    CBC and differential; Future    Comprehensive metabolic panel; Future    Hemoglobin A1C; Future    Lipid panel; Future    atorvastatin (LIPITOR) 20 mg tablet; Take 1 tablet (20 mg total) by mouth daily

## 2025-02-04 NOTE — ASSESSMENT & PLAN NOTE
Previous xray 2022 mild to moderate degenerative changes   Can use acetaminophen, topicals, muscle relaxer PRN   She declines PT   Consider pain mgt if no improvement   Orders:    Diclofenac Sodium (VOLTAREN) 1 %; Apply 2 g topically 4 (four) times a day    lidocaine (Lidoderm) 5 %; Apply 1 patch topically over 12 hours daily Remove & Discard patch within 12 hours or as directed by MD BATISTA spine lumbar minimum 4 views non injury; Future    acetaminophen (TYLENOL) 650 mg CR tablet; Take 1 tablet (650 mg total) by mouth every 8 (eight) hours as needed for mild pain

## 2025-02-07 ENCOUNTER — APPOINTMENT (OUTPATIENT)
Dept: LAB | Facility: HOSPITAL | Age: 75
End: 2025-02-07
Payer: MEDICARE

## 2025-02-07 ENCOUNTER — HOSPITAL ENCOUNTER (OUTPATIENT)
Dept: RADIOLOGY | Facility: HOSPITAL | Age: 75
End: 2025-02-07
Payer: MEDICARE

## 2025-02-07 ENCOUNTER — HOSPITAL ENCOUNTER (OUTPATIENT)
Dept: BONE DENSITY | Facility: CLINIC | Age: 75
End: 2025-02-07
Payer: MEDICARE

## 2025-02-07 ENCOUNTER — TELEPHONE (OUTPATIENT)
Dept: FAMILY MEDICINE CLINIC | Facility: CLINIC | Age: 75
End: 2025-02-07

## 2025-02-07 VITALS — BODY MASS INDEX: 23.46 KG/M2 | WEIGHT: 146 LBS | HEIGHT: 66 IN

## 2025-02-07 DIAGNOSIS — M54.42 CHRONIC LEFT-SIDED LOW BACK PAIN WITH LEFT-SIDED SCIATICA: ICD-10-CM

## 2025-02-07 DIAGNOSIS — R73.03 PREDIABETES: ICD-10-CM

## 2025-02-07 DIAGNOSIS — I12.9 BENIGN HYPERTENSION WITH CKD (CHRONIC KIDNEY DISEASE) STAGE III (HCC): ICD-10-CM

## 2025-02-07 DIAGNOSIS — E78.5 HYPERLIPIDEMIA, UNSPECIFIED HYPERLIPIDEMIA TYPE: ICD-10-CM

## 2025-02-07 DIAGNOSIS — M85.80 OSTEOPENIA AFTER MENOPAUSE: ICD-10-CM

## 2025-02-07 DIAGNOSIS — N18.30 BENIGN HYPERTENSION WITH CKD (CHRONIC KIDNEY DISEASE) STAGE III (HCC): ICD-10-CM

## 2025-02-07 DIAGNOSIS — E87.6 HYPOKALEMIA: ICD-10-CM

## 2025-02-07 DIAGNOSIS — N18.31 STAGE 3A CHRONIC KIDNEY DISEASE (HCC): ICD-10-CM

## 2025-02-07 DIAGNOSIS — Z78.0 OSTEOPENIA AFTER MENOPAUSE: ICD-10-CM

## 2025-02-07 DIAGNOSIS — G89.29 CHRONIC LEFT-SIDED LOW BACK PAIN WITH LEFT-SIDED SCIATICA: ICD-10-CM

## 2025-02-07 LAB
ALBUMIN SERPL BCG-MCNC: 4.5 G/DL (ref 3.5–5)
ALP SERPL-CCNC: 102 U/L (ref 34–104)
ALT SERPL W P-5'-P-CCNC: 10 U/L (ref 7–52)
ANION GAP SERPL CALCULATED.3IONS-SCNC: 8 MMOL/L (ref 4–13)
AST SERPL W P-5'-P-CCNC: 15 U/L (ref 13–39)
BASOPHILS # BLD AUTO: 0.06 THOUSANDS/ΜL (ref 0–0.1)
BASOPHILS NFR BLD AUTO: 1 % (ref 0–1)
BILIRUB SERPL-MCNC: 0.41 MG/DL (ref 0.2–1)
BUN SERPL-MCNC: 18 MG/DL (ref 5–25)
CALCIUM SERPL-MCNC: 9.8 MG/DL (ref 8.4–10.2)
CHLORIDE SERPL-SCNC: 100 MMOL/L (ref 96–108)
CHOLEST SERPL-MCNC: 128 MG/DL (ref ?–200)
CO2 SERPL-SCNC: 31 MMOL/L (ref 21–32)
CREAT SERPL-MCNC: 1.19 MG/DL (ref 0.6–1.3)
EOSINOPHIL # BLD AUTO: 0.12 THOUSAND/ΜL (ref 0–0.61)
EOSINOPHIL NFR BLD AUTO: 1 % (ref 0–6)
ERYTHROCYTE [DISTWIDTH] IN BLOOD BY AUTOMATED COUNT: 12.8 % (ref 11.6–15.1)
EST. AVERAGE GLUCOSE BLD GHB EST-MCNC: 126 MG/DL
GFR SERPL CREATININE-BSD FRML MDRD: 44 ML/MIN/1.73SQ M
GLUCOSE P FAST SERPL-MCNC: 100 MG/DL (ref 65–99)
HBA1C MFR BLD: 6 %
HCT VFR BLD AUTO: 41.1 % (ref 34.8–46.1)
HDLC SERPL-MCNC: 49 MG/DL
HGB BLD-MCNC: 13.3 G/DL (ref 11.5–15.4)
IMM GRANULOCYTES # BLD AUTO: 0.03 THOUSAND/UL (ref 0–0.2)
IMM GRANULOCYTES NFR BLD AUTO: 0 % (ref 0–2)
LDLC SERPL CALC-MCNC: 59 MG/DL (ref 0–100)
LYMPHOCYTES # BLD AUTO: 2.2 THOUSANDS/ΜL (ref 0.6–4.47)
LYMPHOCYTES NFR BLD AUTO: 25 % (ref 14–44)
MCH RBC QN AUTO: 31.4 PG (ref 26.8–34.3)
MCHC RBC AUTO-ENTMCNC: 32.4 G/DL (ref 31.4–37.4)
MCV RBC AUTO: 97 FL (ref 82–98)
MONOCYTES # BLD AUTO: 0.5 THOUSAND/ΜL (ref 0.17–1.22)
MONOCYTES NFR BLD AUTO: 6 % (ref 4–12)
NEUTROPHILS # BLD AUTO: 5.96 THOUSANDS/ΜL (ref 1.85–7.62)
NEUTS SEG NFR BLD AUTO: 67 % (ref 43–75)
NONHDLC SERPL-MCNC: 79 MG/DL
NRBC BLD AUTO-RTO: 0 /100 WBCS
PLATELET # BLD AUTO: 263 THOUSANDS/UL (ref 149–390)
PMV BLD AUTO: 10.9 FL (ref 8.9–12.7)
POTASSIUM SERPL-SCNC: 3.7 MMOL/L (ref 3.5–5.3)
PROT SERPL-MCNC: 8 G/DL (ref 6.4–8.4)
RBC # BLD AUTO: 4.24 MILLION/UL (ref 3.81–5.12)
SODIUM SERPL-SCNC: 139 MMOL/L (ref 135–147)
TRIGL SERPL-MCNC: 100 MG/DL (ref ?–150)
WBC # BLD AUTO: 8.87 THOUSAND/UL (ref 4.31–10.16)

## 2025-02-07 PROCEDURE — 36415 COLL VENOUS BLD VENIPUNCTURE: CPT

## 2025-02-07 PROCEDURE — 83036 HEMOGLOBIN GLYCOSYLATED A1C: CPT

## 2025-02-07 PROCEDURE — 77080 DXA BONE DENSITY AXIAL: CPT

## 2025-02-07 PROCEDURE — 85025 COMPLETE CBC W/AUTO DIFF WBC: CPT

## 2025-02-07 PROCEDURE — 80053 COMPREHEN METABOLIC PANEL: CPT

## 2025-02-07 PROCEDURE — 80061 LIPID PANEL: CPT

## 2025-02-07 PROCEDURE — 72110 X-RAY EXAM L-2 SPINE 4/>VWS: CPT

## 2025-02-07 NOTE — TELEPHONE ENCOUNTER
PCP SIGNATURE NEEDED FOR [facility] FORM RECEIVED VIA FAX AND PLACED IN PCP FOLDER TO BE DELIVERED AT ASSIGNED TIMES.

## 2025-02-10 ENCOUNTER — RESULTS FOLLOW-UP (OUTPATIENT)
Dept: FAMILY MEDICINE CLINIC | Facility: CLINIC | Age: 75
End: 2025-02-10

## 2025-02-13 ENCOUNTER — TELEPHONE (OUTPATIENT)
Dept: FAMILY MEDICINE CLINIC | Facility: CLINIC | Age: 75
End: 2025-02-13

## 2025-02-17 NOTE — TELEPHONE ENCOUNTER
FAXED ON 02/17/25 TO [facility] at [phone]. FAX CONFIRMATION RECEIVED.

## 2025-02-20 ENCOUNTER — TELEPHONE (OUTPATIENT)
Dept: FAMILY MEDICINE CLINIC | Facility: CLINIC | Age: 75
End: 2025-02-20

## 2025-02-20 NOTE — TELEPHONE ENCOUNTER
PCP SIGNATURE NEEDED FOR Medical Update Form FORM RECEIVED VIA FAX AND PLACED IN PCP FOLDER TO BE DELIVERED AT ASSIGNED TIMES.        Medical Update Form

## 2025-03-03 ENCOUNTER — TELEPHONE (OUTPATIENT)
Dept: FAMILY MEDICINE CLINIC | Facility: CLINIC | Age: 75
End: 2025-03-03

## 2025-03-03 NOTE — TELEPHONE ENCOUNTER
PCP SIGNATURE NEEDED FOR [facility] FORM RECEIVED VIA FAX AND PLACED IN PCP FOLDER TO BE DELIVERED AT ASSIGNED TIMES.      [facility]/  Addendum to : Plan of treatment Medical update  Cert period- 2/19/25 - 4/19/25

## 2025-03-24 NOTE — PROGRESS NOTES
Cystoscopy     Date/Time  3/31/2025 1:00 PM     Performed by  Kenneth Rouse MD   Authorized by  Kenneth Rouse MD     Universal Protocol:  procedure performed by consultantConsent: Verbal consent obtained. Written consent obtained.  Risks and benefits: risks, benefits and alternatives were discussed  Consent given by: patient  Patient understanding: patient states understanding of the procedure being performed  Patient consent: the patient's understanding of the procedure matches consent given  Procedure consent: procedure consent matches procedure scheduled  Relevant documents: relevant documents present and verified  Test results: test results available and properly labeled  Site marked: the operative site was marked  Patient identity confirmed: verbally with patient      Procedure Details:  Procedure type: cystoscopy    Patient tolerance: Patient tolerated the procedure well with no immediate complications    Additional Procedure Details: Office Cystoscopy Procedure Note    Indication:    Hematuria    Informed consent   The risks, benefits, complications, treatment options, and expected outcomes were discussed with the patient. The patient concurred with the proposed plan and provided informed consent.    Anesthesia  Lidocaine jelly 2%    Antibiotic prophylaxis   None    Procedure  In the presence of a female nurse, the patient was placed in the supine position with knees laid to the side, was prepped and draped in the usual manner using sterile technique, and 2% lidocaine jelly instilled into the urethra. Prior to this pelvic examination showed normal  labia majora and minora, a normal vaginal introitus, and atrophic vaginal mucosa. Upon stress maneuvers there was no stress incontinence.  A 17 F flexible cystoscope was then inserted into the urethra and the urethra and bladder carefully examined.  The following findings were noted:    Findings:  Urethra:  Moderate urethral prolapse with beefy red  mucosa  Bladder:  Normal  Ureteral orifices:  Normal  Other findings:  None, retroflexed view confirms    Specimens: None                 Complications:    None; patient tolerated the procedure well           Disposition: To home            Condition: Stable    Plan:  Hematuria evaluation complete. Microscopic hematuria likely secondary to urethral prolapse.     Can repeat UA in 1 year. If worsening degree of microscopic hematuria or new gross hematuria, she should be reevaluated.

## 2025-03-31 ENCOUNTER — PROCEDURE VISIT (OUTPATIENT)
Dept: UROLOGY | Facility: CLINIC | Age: 75
End: 2025-03-31
Payer: MEDICARE

## 2025-03-31 VITALS
HEART RATE: 95 BPM | OXYGEN SATURATION: 98 % | DIASTOLIC BLOOD PRESSURE: 80 MMHG | BODY MASS INDEX: 23.46 KG/M2 | HEIGHT: 66 IN | WEIGHT: 146 LBS | SYSTOLIC BLOOD PRESSURE: 140 MMHG

## 2025-03-31 DIAGNOSIS — R31.29 MICROSCOPIC HEMATURIA: Primary | ICD-10-CM

## 2025-03-31 PROCEDURE — 52000 CYSTOURETHROSCOPY: CPT

## 2025-04-24 ENCOUNTER — APPOINTMENT (OUTPATIENT)
Dept: LAB | Facility: HOSPITAL | Age: 75
End: 2025-04-24
Payer: MEDICARE

## 2025-04-24 ENCOUNTER — OFFICE VISIT (OUTPATIENT)
Dept: FAMILY MEDICINE CLINIC | Facility: CLINIC | Age: 75
End: 2025-04-24

## 2025-04-24 VITALS
SYSTOLIC BLOOD PRESSURE: 130 MMHG | BODY MASS INDEX: 23.46 KG/M2 | WEIGHT: 146 LBS | OXYGEN SATURATION: 98 % | RESPIRATION RATE: 16 BRPM | DIASTOLIC BLOOD PRESSURE: 70 MMHG | HEIGHT: 66 IN | TEMPERATURE: 98 F | HEART RATE: 69 BPM

## 2025-04-24 DIAGNOSIS — N18.31 STAGE 3A CHRONIC KIDNEY DISEASE (HCC): Primary | ICD-10-CM

## 2025-04-24 DIAGNOSIS — E78.5 HYPERLIPIDEMIA, UNSPECIFIED HYPERLIPIDEMIA TYPE: ICD-10-CM

## 2025-04-24 DIAGNOSIS — G89.29 CHRONIC LEFT-SIDED LOW BACK PAIN WITH LEFT-SIDED SCIATICA: ICD-10-CM

## 2025-04-24 DIAGNOSIS — I12.9 BENIGN HYPERTENSION WITH CKD (CHRONIC KIDNEY DISEASE) STAGE III (HCC): ICD-10-CM

## 2025-04-24 DIAGNOSIS — I15.9 SECONDARY HYPERTENSION: Chronic | ICD-10-CM

## 2025-04-24 DIAGNOSIS — R73.03 PRE-DIABETES: ICD-10-CM

## 2025-04-24 DIAGNOSIS — R42 DIZZINESS: ICD-10-CM

## 2025-04-24 DIAGNOSIS — N18.31 STAGE 3A CHRONIC KIDNEY DISEASE (HCC): ICD-10-CM

## 2025-04-24 DIAGNOSIS — N18.30 BENIGN HYPERTENSION WITH CKD (CHRONIC KIDNEY DISEASE) STAGE III (HCC): ICD-10-CM

## 2025-04-24 DIAGNOSIS — M54.42 CHRONIC LEFT-SIDED LOW BACK PAIN WITH LEFT-SIDED SCIATICA: ICD-10-CM

## 2025-04-24 LAB
ALBUMIN SERPL BCG-MCNC: 4.3 G/DL (ref 3.5–5)
ALP SERPL-CCNC: 115 U/L (ref 34–104)
ALT SERPL W P-5'-P-CCNC: 16 U/L (ref 7–52)
ANION GAP SERPL CALCULATED.3IONS-SCNC: 10 MMOL/L (ref 4–13)
AST SERPL W P-5'-P-CCNC: 15 U/L (ref 13–39)
BACTERIA UR QL AUTO: ABNORMAL /HPF
BASOPHILS # BLD AUTO: 0.06 THOUSANDS/ÂΜL (ref 0–0.1)
BASOPHILS NFR BLD AUTO: 1 % (ref 0–1)
BILIRUB SERPL-MCNC: 0.37 MG/DL (ref 0.2–1)
BILIRUB UR QL STRIP: NEGATIVE
BUN SERPL-MCNC: 20 MG/DL (ref 5–25)
CALCIUM SERPL-MCNC: 9.1 MG/DL (ref 8.4–10.2)
CHLORIDE SERPL-SCNC: 99 MMOL/L (ref 96–108)
CLARITY UR: CLEAR
CO2 SERPL-SCNC: 30 MMOL/L (ref 21–32)
COLOR UR: ABNORMAL
CREAT SERPL-MCNC: 1.18 MG/DL (ref 0.6–1.3)
CREAT UR-MCNC: 141.1 MG/DL
EOSINOPHIL # BLD AUTO: 0.11 THOUSAND/ÂΜL (ref 0–0.61)
EOSINOPHIL NFR BLD AUTO: 1 % (ref 0–6)
ERYTHROCYTE [DISTWIDTH] IN BLOOD BY AUTOMATED COUNT: 12.8 % (ref 11.6–15.1)
GFR SERPL CREATININE-BSD FRML MDRD: 44 ML/MIN/1.73SQ M
GLUCOSE P FAST SERPL-MCNC: 133 MG/DL (ref 65–99)
GLUCOSE UR STRIP-MCNC: NEGATIVE MG/DL
HCT VFR BLD AUTO: 39.9 % (ref 34.8–46.1)
HGB BLD-MCNC: 12.5 G/DL (ref 11.5–15.4)
HGB UR QL STRIP.AUTO: 250
IMM GRANULOCYTES # BLD AUTO: 0.05 THOUSAND/UL (ref 0–0.2)
IMM GRANULOCYTES NFR BLD AUTO: 1 % (ref 0–2)
KETONES UR STRIP-MCNC: NEGATIVE MG/DL
LEUKOCYTE ESTERASE UR QL STRIP: 500
LYMPHOCYTES # BLD AUTO: 1.98 THOUSANDS/ÂΜL (ref 0.6–4.47)
LYMPHOCYTES NFR BLD AUTO: 24 % (ref 14–44)
MCH RBC QN AUTO: 30.8 PG (ref 26.8–34.3)
MCHC RBC AUTO-ENTMCNC: 31.3 G/DL (ref 31.4–37.4)
MCV RBC AUTO: 98 FL (ref 82–98)
MICROALBUMIN UR-MCNC: 16.8 MG/L
MICROALBUMIN/CREAT 24H UR: 12 MG/G CREATININE (ref 0–30)
MONOCYTES # BLD AUTO: 0.54 THOUSAND/ÂΜL (ref 0.17–1.22)
MONOCYTES NFR BLD AUTO: 7 % (ref 4–12)
NEUTROPHILS # BLD AUTO: 5.56 THOUSANDS/ÂΜL (ref 1.85–7.62)
NEUTS SEG NFR BLD AUTO: 66 % (ref 43–75)
NITRITE UR QL STRIP: NEGATIVE
NON-SQ EPI CELLS URNS QL MICRO: ABNORMAL /HPF
NRBC BLD AUTO-RTO: 0 /100 WBCS
PH UR STRIP.AUTO: 6 [PH]
PLATELET # BLD AUTO: 269 THOUSANDS/UL (ref 149–390)
PMV BLD AUTO: 10.8 FL (ref 8.9–12.7)
POTASSIUM SERPL-SCNC: 3.3 MMOL/L (ref 3.5–5.3)
PROT SERPL-MCNC: 7.6 G/DL (ref 6.4–8.4)
PROT UR STRIP-MCNC: ABNORMAL MG/DL
RBC # BLD AUTO: 4.06 MILLION/UL (ref 3.81–5.12)
RBC #/AREA URNS AUTO: ABNORMAL /HPF
SODIUM SERPL-SCNC: 139 MMOL/L (ref 135–147)
SP GR UR STRIP.AUTO: 1.01 (ref 1–1.04)
UROBILINOGEN UA: NEGATIVE MG/DL
WBC # BLD AUTO: 8.3 THOUSAND/UL (ref 4.31–10.16)
WBC #/AREA URNS AUTO: ABNORMAL /HPF

## 2025-04-24 PROCEDURE — G2211 COMPLEX E/M VISIT ADD ON: HCPCS | Performed by: NURSE PRACTITIONER

## 2025-04-24 PROCEDURE — 81001 URINALYSIS AUTO W/SCOPE: CPT

## 2025-04-24 PROCEDURE — 80053 COMPREHEN METABOLIC PANEL: CPT

## 2025-04-24 PROCEDURE — 85025 COMPLETE CBC W/AUTO DIFF WBC: CPT

## 2025-04-24 PROCEDURE — 87086 URINE CULTURE/COLONY COUNT: CPT

## 2025-04-24 PROCEDURE — 36415 COLL VENOUS BLD VENIPUNCTURE: CPT

## 2025-04-24 PROCEDURE — 99213 OFFICE O/P EST LOW 20 MIN: CPT | Performed by: NURSE PRACTITIONER

## 2025-04-24 RX ORDER — LIDOCAINE 50 MG/G
1 PATCH TOPICAL DAILY
Qty: 30 PATCH | Refills: 5 | Status: SHIPPED | OUTPATIENT
Start: 2025-04-24

## 2025-04-24 NOTE — ASSESSMENT & PLAN NOTE
Previous xray 2022 mild to moderate degenerative changes   Can use acetaminophen, topicals, muscle relaxer PRN   She declines PT   Consider pain mgt if no improvement   Orders:    lidocaine (Lidoderm) 5 %; Apply 1 patch topically over 12 hours daily Remove & Discard patch within 12 hours or as directed by MD

## 2025-04-24 NOTE — ASSESSMENT & PLAN NOTE
Lab Results   Component Value Date    CHOLESTEROL 128 02/07/2025    CHOLESTEROL 135 02/28/2024    CHOLESTEROL 148 11/01/2023     Lab Results   Component Value Date    HDL 49 (L) 02/07/2025    HDL 48 (L) 02/28/2024    HDL 48 (L) 11/01/2023     Lab Results   Component Value Date    TRIG 100 02/07/2025    TRIG 86 02/28/2024    TRIG 93 11/01/2023     Lab Results   Component Value Date    NONHDLC 79 02/07/2025    NONHDLC 87 02/28/2024    NONHDLC 100 11/01/2023     Lab Results   Component Value Date    LDLCALC 59 02/07/2025     Continue atorvastatin 20 mg daily

## 2025-04-24 NOTE — PROGRESS NOTES
Name: Cary Gee      : 1950      MRN: [id number]  Encounter Provider: MARISSA Mcpherson  Encounter Date: 2025   Encounter department: [facility]  :  Assessment & Plan  Chronic left-sided low back pain with left-sided sciatica  Previous xray  mild to moderate degenerative changes   Can use acetaminophen, topicals, muscle relaxer PRN   She declines PT   Consider pain mgt if no improvement   Orders:    lidocaine (Lidoderm) 5 %; Apply 1 patch topically over 12 hours daily Remove & Discard patch within 12 hours or as directed by MD    Stage 3a chronic kidney disease (HCC)  Lab Results   Component Value Date    EGFR 44 2025    EGFR 44 2025    EGFR 49 2024    CREATININE 1.19 2025    CREATININE 1.20 2025    CREATININE 1.10 2024       Orders:    CBC and differential; Future    Comprehensive metabolic panel; Future    Urinalysis with microscopic; Future    Urine culture; Future    Albumin / creatinine urine ratio; Future    Dizziness  3 days of dizziness, n/d, mid back and upper abdominal pain  Likely viral   Will check labs, urine r/o DANIEL          Secondary hypertension  BP Readings from Last 3 Encounters:   25 130/70   25 140/80   25 132/80           Bp is within goal, continue HCTZ 25 mg daily and enalapril 10 mg daily          Benign hypertension with CKD (chronic kidney disease) stage III ([facility])  Lab Results   Component Value Date    EGFR 44 2025    EGFR 44 2025    EGFR 49 2024    CREATININE 1.19 2025    CREATININE 1.20 2025    CREATININE 1.10 2024     Continue to monitor, avoid NSAIDs         Hyperlipidemia, unspecified hyperlipidemia type  Lab Results   Component Value Date    CHOLESTEROL 128 2025    CHOLESTEROL 135 2024    CHOLESTEROL 148 2023     Lab Results   Component Value Date    HDL 49 (L) 2025    HDL 48 (L) 2024    HDL 48 (L)  11/01/2023     Lab Results   Component Value Date    TRIG 100 02/07/2025    TRIG 86 02/28/2024    TRIG 93 11/01/2023     Lab Results   Component Value Date    NONHDLC 79 02/07/2025    NONHDLC 87 02/28/2024    NONHDLC 100 11/01/2023     Lab Results   Component Value Date    LDLCALC 59 02/07/2025     Continue atorvastatin 20 mg daily            Pre-diabetes  Lab Results   Component Value Date    HGBA1C 6.0 (H) 02/07/2025     Diet controlled  Repeat A1c next visit                   History of Present Illness   74 YO female w/ PMH htn, lower GI bleed, chronic hepatitis C, syncope, hyperlipidemia, CKD stage 3 here for follow up of chronic conditions.   Patient reports left sided back pain. The pain does not radiate. She has not tried acetaminophen. Denies numbness/ tingling, incontinence, falls, weakness, fever, saddle anesthesia.     She also reports 3 days of dizziness when standing, abdominal pain, diarrhea, and generalized fatigue that seemed to improve today.      The following portions of the patient's history were reviewed and updated as appropriate: allergies, current medications, past family history, past medical history, past social history, past surgical history and problem list.        Review of Systems   Constitutional:  Negative for chills and fever.   HENT:  Positive for sinus pressure. Negative for ear pain and sore throat.    Eyes:  Negative for pain and visual disturbance.   Respiratory: Negative.  Negative for cough and shortness of breath.    Cardiovascular:  Negative for chest pain and palpitations.   Gastrointestinal:  Positive for abdominal pain and diarrhea. Negative for vomiting.   Genitourinary:  Negative for dysuria and hematuria.   Musculoskeletal:  Positive for arthralgias, back pain and myalgias.        Left side   Skin:  Negative for color change and rash.   Neurological:  Positive for dizziness. Negative for seizures and syncope.        For about one week, went away yesterday   All other  "systems reviewed and are negative.      Objective   /70 (BP Location: Right arm, Patient Position: Sitting, Cuff Size: Standard)   Pulse 69   Temp 98 °F (36.7 °C) (Temporal)   Resp 16   Ht 5' 5.5\" (1.664 m)   Wt 66.2 kg (146 lb)   SpO2 98%   BMI 23.93 kg/m²      Physical Exam  Vitals and nursing note reviewed.   Constitutional:       General: She is not in acute distress.     Appearance: She is well-developed.   HENT:      Head: Normocephalic and atraumatic.      Right Ear: External ear normal.      Left Ear: External ear normal.   Eyes:      Conjunctiva/sclera: Conjunctivae normal.   Cardiovascular:      Rate and Rhythm: Normal rate and regular rhythm.   Pulmonary:      Effort: Pulmonary effort is normal. No respiratory distress.      Breath sounds: Normal breath sounds.   Abdominal:      Palpations: Abdomen is soft.      Tenderness: There is no abdominal tenderness. There is no right CVA tenderness or left CVA tenderness.   Musculoskeletal:         General: No swelling.      Cervical back: Neck supple.   Skin:     General: Skin is warm and dry.      Capillary Refill: Capillary refill takes less than 2 seconds.   Neurological:      Mental Status: She is alert and oriented to person, place, and time.   Psychiatric:         Mood and Affect: Mood normal.         "

## 2025-04-24 NOTE — ASSESSMENT & PLAN NOTE
BP Readings from Last 3 Encounters:   04/24/25 130/70   03/31/25 140/80   02/04/25 132/80           Bp is within goal, continue HCTZ 25 mg daily and enalapril 10 mg daily

## 2025-04-24 NOTE — ASSESSMENT & PLAN NOTE
Lab Results   Component Value Date    HGBA1C 6.0 (H) 02/07/2025     Diet controlled  Repeat A1c next visit

## 2025-04-24 NOTE — ASSESSMENT & PLAN NOTE
Lab Results   Component Value Date    EGFR 44 02/07/2025    EGFR 44 01/16/2025    EGFR 49 02/28/2024    CREATININE 1.19 02/07/2025    CREATININE 1.20 01/16/2025    CREATININE 1.10 02/28/2024     Continue to monitor, avoid NSAIDs

## 2025-04-24 NOTE — ASSESSMENT & PLAN NOTE
Lab Results   Component Value Date    EGFR 44 02/07/2025    EGFR 44 01/16/2025    EGFR 49 02/28/2024    CREATININE 1.19 02/07/2025    CREATININE 1.20 01/16/2025    CREATININE 1.10 02/28/2024       Orders:    CBC and differential; Future    Comprehensive metabolic panel; Future    Urinalysis with microscopic; Future    Urine culture; Future    Albumin / creatinine urine ratio; Future

## 2025-04-25 LAB — BACTERIA UR CULT: NORMAL

## (undated) DEVICE — RESOLUTION CLIP 2.8MM X 235CM STR LF DISP

## (undated) DEVICE — FIAPC® PROBE W/ FILTER 2200 C OD 2.3MM/6.9FR; L 2.2M/7.2FT: Brand: ERBE